# Patient Record
Sex: FEMALE | Race: WHITE | NOT HISPANIC OR LATINO | Employment: FULL TIME | URBAN - NONMETROPOLITAN AREA
[De-identification: names, ages, dates, MRNs, and addresses within clinical notes are randomized per-mention and may not be internally consistent; named-entity substitution may affect disease eponyms.]

---
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
## 2019-01-25 ENCOUNTER — HOSPITAL ENCOUNTER (OUTPATIENT)
Dept: LAB | Facility: MEDICAL CENTER | Age: 19
End: 2019-01-25
Attending: INTERNAL MEDICINE
Payer: COMMERCIAL

## 2019-01-25 LAB
ALBUMIN SERPL BCP-MCNC: 4.5 G/DL (ref 3.2–4.9)
ALBUMIN/GLOB SERPL: 1.7 G/DL
ALP SERPL-CCNC: 88 U/L (ref 30–99)
ALT SERPL-CCNC: 13 U/L (ref 2–50)
ANION GAP SERPL CALC-SCNC: 7 MMOL/L (ref 0–11.9)
AST SERPL-CCNC: 15 U/L (ref 12–45)
BASOPHILS # BLD AUTO: 0.4 % (ref 0–1.8)
BASOPHILS # BLD: 0.03 K/UL (ref 0–0.12)
BILIRUB SERPL-MCNC: 0.5 MG/DL (ref 0.1–1.5)
BUN SERPL-MCNC: 8 MG/DL (ref 8–22)
CALCIUM SERPL-MCNC: 9.2 MG/DL (ref 8.5–10.5)
CHLORIDE SERPL-SCNC: 107 MMOL/L (ref 96–112)
CO2 SERPL-SCNC: 25 MMOL/L (ref 20–33)
CREAT SERPL-MCNC: 0.81 MG/DL (ref 0.5–1.4)
CRP SERPL HS-MCNC: 0.33 MG/DL (ref 0–0.75)
EOSINOPHIL # BLD AUTO: 0.21 K/UL (ref 0–0.51)
EOSINOPHIL NFR BLD: 3.1 % (ref 0–6.9)
ERYTHROCYTE [DISTWIDTH] IN BLOOD BY AUTOMATED COUNT: 40.8 FL (ref 35.9–50)
GLOBULIN SER CALC-MCNC: 2.7 G/DL (ref 1.9–3.5)
GLUCOSE SERPL-MCNC: 92 MG/DL (ref 65–99)
HCT VFR BLD AUTO: 44.6 % (ref 37–47)
HGB BLD-MCNC: 14.4 G/DL (ref 12–16)
IMM GRANULOCYTES # BLD AUTO: 0.02 K/UL (ref 0–0.11)
IMM GRANULOCYTES NFR BLD AUTO: 0.3 % (ref 0–0.9)
LYMPHOCYTES # BLD AUTO: 1.43 K/UL (ref 1–4.8)
LYMPHOCYTES NFR BLD: 21.2 % (ref 22–41)
MCH RBC QN AUTO: 28.9 PG (ref 27–33)
MCHC RBC AUTO-ENTMCNC: 32.3 G/DL (ref 33.6–35)
MCV RBC AUTO: 89.4 FL (ref 81.4–97.8)
MONOCYTES # BLD AUTO: 0.52 K/UL (ref 0–0.85)
MONOCYTES NFR BLD AUTO: 7.7 % (ref 0–13.4)
NEUTROPHILS # BLD AUTO: 4.53 K/UL (ref 2–7.15)
NEUTROPHILS NFR BLD: 67.3 % (ref 44–72)
NRBC # BLD AUTO: 0 K/UL
NRBC BLD-RTO: 0 /100 WBC
PLATELET # BLD AUTO: 244 K/UL (ref 164–446)
PMV BLD AUTO: 12.5 FL (ref 9–12.9)
POTASSIUM SERPL-SCNC: 4.1 MMOL/L (ref 3.6–5.5)
PROT SERPL-MCNC: 7.2 G/DL (ref 6–8.2)
RBC # BLD AUTO: 4.99 M/UL (ref 4.2–5.4)
SODIUM SERPL-SCNC: 139 MMOL/L (ref 135–145)
WBC # BLD AUTO: 6.7 K/UL (ref 4.8–10.8)

## 2019-01-25 PROCEDURE — 36415 COLL VENOUS BLD VENIPUNCTURE: CPT

## 2019-01-25 PROCEDURE — 86140 C-REACTIVE PROTEIN: CPT

## 2019-01-25 PROCEDURE — 85025 COMPLETE CBC W/AUTO DIFF WBC: CPT

## 2019-01-25 PROCEDURE — 80053 COMPREHEN METABOLIC PANEL: CPT

## 2019-04-01 ENCOUNTER — NON-PROVIDER VISIT (OUTPATIENT)
Dept: MEDICAL GROUP | Facility: PHYSICIAN GROUP | Age: 19
End: 2019-04-01
Payer: COMMERCIAL

## 2019-04-01 VITALS — BODY MASS INDEX: 28.88 KG/M2 | WEIGHT: 184 LBS | HEIGHT: 67 IN

## 2019-04-01 DIAGNOSIS — N92.6 IRREGULAR MENSTRUAL CYCLE: ICD-10-CM

## 2019-04-01 LAB
INT CON NEG: NORMAL
INT CON POS: NORMAL
POC URINE PREGNANCY TEST: NEGATIVE

## 2019-04-01 PROCEDURE — 81025 URINE PREGNANCY TEST: CPT | Performed by: NURSE PRACTITIONER

## 2019-04-01 RX ORDER — MEDROXYPROGESTERONE ACETATE 150 MG/ML
150 INJECTION, SUSPENSION INTRAMUSCULAR ONCE
Status: COMPLETED | OUTPATIENT
Start: 2019-04-01 | End: 2019-04-01

## 2019-04-01 RX ADMIN — MEDROXYPROGESTERONE ACETATE 150 MG: 150 INJECTION, SUSPENSION INTRAMUSCULAR at 14:26

## 2019-04-01 NOTE — PROGRESS NOTES
PemaRobertAlexandra Hines is a 18 y.o. here for a Depo Provera Injection.     Date of last Depo Provera Injection: 12/6/2018  Current date within therapeutic range?: No   Urine pregnancy test done (needed if out of date range): yes--negative  Date of office visit:09/20/2018  Date of last pap (if > 21 years old)/ GYN exam: unknown  Dx: Contraceptive use    Order and dose verified by: MP  Patient tolerated injection and no adverse effects were observed or reported: Yes    # of Administrations remaining in MAR: 0  Next injection due between Jun 17 and July 1.

## 2019-04-23 ENCOUNTER — OFFICE VISIT (OUTPATIENT)
Dept: URGENT CARE | Facility: PHYSICIAN GROUP | Age: 19
End: 2019-04-23
Payer: COMMERCIAL

## 2019-04-23 ENCOUNTER — HOSPITAL ENCOUNTER (OUTPATIENT)
Facility: MEDICAL CENTER | Age: 19
End: 2019-04-23
Attending: PHYSICIAN ASSISTANT
Payer: COMMERCIAL

## 2019-04-23 ENCOUNTER — HOSPITAL ENCOUNTER (OUTPATIENT)
Facility: MEDICAL CENTER | Age: 19
End: 2019-04-23
Attending: EMERGENCY MEDICINE
Payer: COMMERCIAL

## 2019-04-23 VITALS
TEMPERATURE: 98.2 F | SYSTOLIC BLOOD PRESSURE: 118 MMHG | RESPIRATION RATE: 16 BRPM | DIASTOLIC BLOOD PRESSURE: 72 MMHG | BODY MASS INDEX: 28.51 KG/M2 | WEIGHT: 182 LBS | OXYGEN SATURATION: 98 % | HEART RATE: 96 BPM

## 2019-04-23 DIAGNOSIS — R10.2 PELVIC PAIN: ICD-10-CM

## 2019-04-23 DIAGNOSIS — R39.9 SYMPTOMS INVOLVING URINARY SYSTEM: ICD-10-CM

## 2019-04-23 LAB
APPEARANCE UR: NORMAL
BILIRUB UR STRIP-MCNC: NEGATIVE MG/DL
COLOR UR AUTO: NORMAL
GLUCOSE UR STRIP.AUTO-MCNC: NEGATIVE MG/DL
INT CON NEG: NORMAL
INT CON POS: NORMAL
KETONES UR STRIP.AUTO-MCNC: NEGATIVE MG/DL
LEUKOCYTE ESTERASE UR QL STRIP.AUTO: NEGATIVE
NITRITE UR QL STRIP.AUTO: NEGATIVE
PH UR STRIP.AUTO: 6 [PH] (ref 5–8)
POC URINE PREGNANCY TEST: NEGATIVE
PROT UR QL STRIP: NEGATIVE MG/DL
RBC UR QL AUTO: NEGATIVE
SP GR UR STRIP.AUTO: 1.02
UROBILINOGEN UR STRIP-MCNC: 0.2 MG/DL

## 2019-04-23 PROCEDURE — 81002 URINALYSIS NONAUTO W/O SCOPE: CPT | Performed by: EMERGENCY MEDICINE

## 2019-04-23 PROCEDURE — 99214 OFFICE O/P EST MOD 30 MIN: CPT | Performed by: EMERGENCY MEDICINE

## 2019-04-23 PROCEDURE — 87591 N.GONORRHOEAE DNA AMP PROB: CPT

## 2019-04-23 PROCEDURE — 87086 URINE CULTURE/COLONY COUNT: CPT

## 2019-04-23 PROCEDURE — 87491 CHLMYD TRACH DNA AMP PROBE: CPT

## 2019-04-23 PROCEDURE — 81025 URINE PREGNANCY TEST: CPT | Performed by: EMERGENCY MEDICINE

## 2019-04-23 ASSESSMENT — ENCOUNTER SYMPTOMS
NAUSEA: 0
SHORTNESS OF BREATH: 0
DIARRHEA: 0
ANOREXIA: 0
BLOOD IN STOOL: 0
FLATUS: 0
ROS GI COMMENTS: NO DECREASED APPETITE.
ABDOMINAL PAIN: 1
FLANK PAIN: 0
HEMATOCHEZIA: 0
CONSTIPATION: 0
VOMITING: 0
BELCHING: 0
FEVER: 0

## 2019-04-23 NOTE — PATIENT INSTRUCTIONS
Use over-the-counter acetaminophen (Tylenol) as needed for pain relief; follow the package instructions for dosing.  Abdominal Pain (Nonspecific)  Your exam might not show the exact reason you have abdominal pain. Since there are many different causes of abdominal pain, another checkup and more tests may be needed. It is very important to follow up for lasting (persistent) or worsening symptoms. A possible cause of abdominal pain in any person who still has his or her appendix is acute appendicitis. Appendicitis is often hard to diagnose. Normal blood tests, urine tests, ultrasound, and CT scans do not completely rule out early appendicitis or other causes of abdominal pain. Sometimes, only the changes that happen over time will allow appendicitis and other causes of abdominal pain to be determined. Other potential problems that may require surgery may also take time to become more apparent. Because of this, it is important that you follow all of the instructions below.  HOME CARE INSTRUCTIONS   · Rest as much as possible.   · Do not eat solid food until your pain is gone.   · While adults or children have pain: A diet of water, weak decaffeinated tea, broth or bouillon, gelatin, oral rehydration solutions (ORS), frozen ice pops, or ice chips may be helpful.   · When pain is gone in adults or children: Start a light diet (dry toast, crackers, applesauce, or white rice). Increase the diet slowly as long as it does not bother you. Eat no dairy products (including cheese and eggs) and no spicy, fatty, fried, or high-fiber foods.   · Use no alcohol, caffeine, or cigarettes.   · Take your regular medicines unless your caregiver told you not to.   · Take any prescribed medicine as directed.   · Only take over-the-counter or prescription medicines for pain, discomfort, or fever as directed by your caregiver. Do not give aspirin to children.   If your caregiver has given you a follow-up appointment, it is very important to  keep that appointment. Not keeping the appointment could result in a permanent injury and/or lasting (chronic) pain and/or disability. If there is any problem keeping the appointment, you must call to reschedule.   SEEK IMMEDIATE MEDICAL CARE IF:   · Your pain is not gone in 24 hours.   · Your pain becomes worse, changes location, or feels different.   · You or your child has an oral temperature above 102° F (38.9° C), not controlled by medicine.   · Your baby is older than 3 months with a rectal temperature of 102° F (38.9° C) or higher.   · Your baby is 3 months old or younger with a rectal temperature of 100.4° F (38° C) or higher.   · You have shaking chills.   · You keep throwing up (vomiting) or cannot drink liquids.   · There is blood in your vomit or you see blood in your bowel movements.   · Your bowel movements become dark or black.   · You have frequent bowel movements.   · Your bowel movements stop (become blocked) or you cannot pass gas.   · You have bloody, frequent, or painful urination.   · You have yellow discoloration in the skin or whites of the eyes.   · Your stomach becomes bloated or bigger.   · You have dizziness or fainting.   · You have chest or back pain.   MAKE SURE YOU:   · Understand these instructions.   · Will watch your condition.   · Will get help right away if you are not doing well or get worse.   Document Released: 12/18/2006 Document Revised: 03/11/2013 Document Reviewed: 11/15/2010  [employer]® Patient Information ©2013 [employer].

## 2019-04-23 NOTE — PROGRESS NOTES
Subjective:      Tamy Hines is a 18 y.o. female who presents with Urinary Frequency (with lower abd pain )            Abdominal Pain   This is a new problem. Episode onset: one month. The problem occurs daily. The problem has been waxing and waning. The pain is located in the suprapubic region, RLQ and LLQ. The pain is mild. The quality of the pain is a sensation of fullness. The abdominal pain does not radiate. Associated symptoms include frequency. Pertinent negatives include no anorexia, belching, constipation, diarrhea, dysuria, fever, flatus, hematochezia, hematuria, melena, nausea or vomiting. Nothing aggravates the pain. The pain is relieved by nothing. She has tried nothing for the symptoms. Her past medical history is significant for abdominal surgery and irritable bowel syndrome.       Review of Systems   Constitutional: Negative for fever.   Respiratory: Negative for shortness of breath.    Cardiovascular: Negative for chest pain.   Gastrointestinal: Positive for abdominal pain. Negative for anorexia, blood in stool, constipation, diarrhea, flatus, hematochezia, melena, nausea and vomiting.        No decreased appetite.   Genitourinary: Positive for frequency and urgency. Negative for dysuria, flank pain and hematuria.        Urinary incontinence. No vaginal discharge or bleeding. LMP months ago. Denies known sexually transmitted disease exposure.   Skin: Negative for rash.     PMH:  has a past medical history of Allergic rhinitis; ASTHMA (8/15/2013); Bowel habit changes; Gynecological disorder; Heart burn; Indigestion; Menarche (8/15/2013); and Nausea, vomiting and diarrhea (11/19/2014).  MEDS:   Current Outpatient Prescriptions:   •  fluticasone (FLONASE) 50 MCG/ACT nasal spray, Spray 1 Spray in nose every day. (Patient not taking: Reported on 4/23/2019), Disp: 16 g, Rfl: 0  •  ibuprofen (MOTRIN) 200 MG Tab, Take 400 mg by mouth every 6 hours as needed., Disp: , Rfl:   ALLERGIES: No Known  Allergies  SURGHX:   Past Surgical History:   Procedure Laterality Date   • SOHAM BY LAPAROSCOPY N/A 12/17/2015    Procedure: SOHAM BY LAPAROSCOPY ;  Surgeon: Yuliet Dale M.D.;  Location: SURGERY [facility];  Service:    • COLONOSCOPY  10/2015   • ENDOSCOPY  08/2015     SOCHX:  reports that she has never smoked. She has never used smokeless tobacco. She reports that she does not drink alcohol or use drugs.  FH: family history includes Other in her father and mother. She was adopted.       Objective:     /72   Pulse 96   Temp 36.8 °C (98.2 °F) (Temporal)   Resp 16   Wt 82.6 kg (182 lb)   SpO2 98%   BMI 28.51 kg/m²      Physical Exam   Constitutional: She appears well-developed and well-nourished. She is cooperative. She does not have a sickly appearance. She does not appear ill. No distress.   HENT:   Mouth/Throat: Mucous membranes are normal.   Eyes: Conjunctivae and lids are normal.   Cardiovascular: Normal rate, regular rhythm and normal heart sounds.    Pulmonary/Chest: Effort normal and breath sounds normal.   Abdominal: Soft. Bowel sounds are normal. She exhibits no distension, no pulsatile midline mass and no mass. There is no hepatosplenomegaly. There is tenderness in the right lower quadrant, suprapubic area and left lower quadrant. There is no rigidity, no guarding and no CVA tenderness. Hernia confirmed negative in the ventral area.   Genitourinary:   Genitourinary Comments: Declined pelvic exam.   Neurological: She is alert.   Skin: Skin is warm and dry.   Psychiatric: She has a normal mood and affect.               Assessment/Plan:     1. Symptoms involving urinary system  negative- POCT Urinalysis  negative- POCT Pregnancy  GC/chlamydia sent  UA reflex to culture sent  2. Pelvic pain  US pelvis ordered  CBC ordered  REF PCP

## 2019-04-24 ENCOUNTER — TELEPHONE (OUTPATIENT)
Dept: URGENT CARE | Facility: PHYSICIAN GROUP | Age: 19
End: 2019-04-24

## 2019-04-24 DIAGNOSIS — R10.2 PELVIC PAIN: ICD-10-CM

## 2019-04-24 DIAGNOSIS — R39.9 URINARY SYMPTOM OR SIGN: ICD-10-CM

## 2019-04-26 LAB
BACTERIA UR CULT: NORMAL
SIGNIFICANT IND 70042: NORMAL
SITE SITE: NORMAL
SOURCE SOURCE: NORMAL

## 2019-04-27 ENCOUNTER — TELEPHONE (OUTPATIENT)
Dept: URGENT CARE | Facility: PHYSICIAN GROUP | Age: 19
End: 2019-04-27

## 2019-04-27 NOTE — TELEPHONE ENCOUNTER
----- Message from Ritu Price P.A.-C. sent at 4/26/2019 11:18 AM PDT -----  Please let pt know the urine culture is negative. Follow up for persistent symptoms.

## 2019-04-27 NOTE — LETTER
April 27, 2019        Tamy Hines  [id number]  [place] [id number]        Dear Tamy    Here are the results of your test(s):   Urine culture: negative    Comments:  Hope you are feeling better.        Sincerely,        Nina Price PAC   Signed Electronically

## 2019-06-25 ENCOUNTER — OFFICE VISIT (OUTPATIENT)
Dept: URGENT CARE | Facility: CLINIC | Age: 19
End: 2019-06-25
Payer: COMMERCIAL

## 2019-06-25 ENCOUNTER — APPOINTMENT (OUTPATIENT)
Dept: RADIOLOGY | Facility: IMAGING CENTER | Age: 19
End: 2019-06-25
Attending: FAMILY MEDICINE
Payer: COMMERCIAL

## 2019-06-25 VITALS
HEART RATE: 74 BPM | BODY MASS INDEX: 29.1 KG/M2 | TEMPERATURE: 97 F | SYSTOLIC BLOOD PRESSURE: 110 MMHG | OXYGEN SATURATION: 95 % | HEIGHT: 67 IN | DIASTOLIC BLOOD PRESSURE: 80 MMHG | WEIGHT: 185.41 LBS | RESPIRATION RATE: 16 BRPM

## 2019-06-25 DIAGNOSIS — S90.122A CONTUSION OF SECOND TOE OF LEFT FOOT, INITIAL ENCOUNTER: ICD-10-CM

## 2019-06-25 DIAGNOSIS — S99.922A INJURY OF TOE ON LEFT FOOT, INITIAL ENCOUNTER: ICD-10-CM

## 2019-06-25 PROCEDURE — 99213 OFFICE O/P EST LOW 20 MIN: CPT | Performed by: FAMILY MEDICINE

## 2019-06-25 PROCEDURE — 73660 X-RAY EXAM OF TOE(S): CPT | Mod: TC,LT | Performed by: FAMILY MEDICINE

## 2019-06-25 ASSESSMENT — ENCOUNTER SYMPTOMS
SHORTNESS OF BREATH: 0
VOMITING: 0
SENSORY CHANGE: 0
ROS SKIN COMMENTS: + BRUISING
FEVER: 0

## 2019-06-25 NOTE — PROGRESS NOTES
"Subjective:     Tamy Hines is a 18 y.o. female who presents for Toe Injury (Possible break)    HPI  Pt presents for evaluation of a new problem   Patient with a toe injury yesterday  Was climbing over the bathroom stall and fell  Her foot fell into the toilet causing immediate pain in second toe of left foot  Now having constant pain in left second toe  Has associated bruising  Pain stays localized and does not radiate    Review of Systems   Constitutional: Negative for fever.   Respiratory: Negative for shortness of breath.    Cardiovascular: Negative for chest pain.   Gastrointestinal: Negative for vomiting.   Skin:        + Bruising   Neurological: Negative for sensory change.       PMH:  has a past medical history of Allergic rhinitis; ASTHMA (8/15/2013); Bowel habit changes; Gynecological disorder; Heart burn; Indigestion; Menarche (8/15/2013); and Nausea, vomiting and diarrhea (11/19/2014).  MEDS:   Current Outpatient Prescriptions:   •  fluticasone (FLONASE) 50 MCG/ACT nasal spray, Spray 1 Spray in nose every day. (Patient not taking: Reported on 4/23/2019), Disp: 16 g, Rfl: 0  •  ibuprofen (MOTRIN) 200 MG Tab, Take 400 mg by mouth every 6 hours as needed., Disp: , Rfl:   ALLERGIES: No Known Allergies  SURGHX:   Past Surgical History:   Procedure Laterality Date   • SOHAM BY LAPAROSCOPY N/A 12/17/2015    Procedure: SOHAM BY LAPAROSCOPY ;  Surgeon: Yuliet Dale M.D.;  Location: SURGERY [facility];  Service:    • COLONOSCOPY  10/2015   • ENDOSCOPY  08/2015     SOCHX:  reports that she has never smoked. She has never used smokeless tobacco. She reports that she does not drink alcohol or use drugs.  FH: Family history was reviewed, not contributing to acute injury     Objective:   /80   Pulse 74   Temp 36.1 °C (97 °F) (Temporal)   Resp 16   Ht 1.702 m (5' 7\")   Wt 84.1 kg (185 lb 6.5 oz)   SpO2 95%   BMI 29.04 kg/m²     Physical Exam   Constitutional: She is oriented to person, " place, and time. She appears well-developed and well-nourished. No distress.   HENT:   Head: Normocephalic and atraumatic.   Musculoskeletal:   Left ankle/foot:  Appearance - +Bruising of the second toe  Palpation - +TTP throughout 2nd toe.  No TTP along midfoot, base of the 5th metatarsal, MTP joints, or other toes.   ROM -limited by pain  Strength -limited pain  Neurovascular - 2+ dorsalis pedis and posterior tibial.  Sensation intact and equal bilaterally   Neurological: She is alert and oriented to person, place, and time. No sensory deficit.   Skin: Skin is warm and dry. She is not diaphoretic.   Psychiatric: She has a normal mood and affect. Her behavior is normal. Judgment and thought content normal.     Assessment/Plan:   Assessment    1. Contusion of second toe of left foot, initial encounter  X-ray shows no fracture.  Patient will be treated with conservative management including ibuprofen, ice, supportive shoe wear, and buddy taping for comfort.  Follow-up as needed.  - DX-TOE(S) 2+ LEFT; Future

## 2019-06-28 ENCOUNTER — NON-PROVIDER VISIT (OUTPATIENT)
Dept: MEDICAL GROUP | Facility: PHYSICIAN GROUP | Age: 19
End: 2019-06-28
Payer: COMMERCIAL

## 2019-06-28 DIAGNOSIS — Z30.42 ENCOUNTER FOR SURVEILLANCE OF INJECTABLE CONTRACEPTIVE: ICD-10-CM

## 2019-06-28 PROCEDURE — 96372 THER/PROPH/DIAG INJ SC/IM: CPT | Performed by: NURSE PRACTITIONER

## 2019-06-28 RX ORDER — MEDROXYPROGESTERONE ACETATE 150 MG/ML
150 INJECTION, SUSPENSION INTRAMUSCULAR ONCE
Status: COMPLETED | OUTPATIENT
Start: 2019-06-28 | End: 2019-06-28

## 2019-06-28 RX ADMIN — MEDROXYPROGESTERONE ACETATE 150 MG: 150 INJECTION, SUSPENSION INTRAMUSCULAR at 13:25

## 2019-07-02 NOTE — ASSESSMENT & PLAN NOTE
Diarrhea for 4 days, 6 to 7 times a day, last this a.m., chronic tenderness,  Bloating goes away with diarrhea, frontal headache for couple days, no blood.  Different than IBS because more frequent diarrhea, and vomiting   Nausea and vomitting for 5 days, not eating, drinking water.  Last vomit last night after dinner.  Had potato chips last.  Stayed home from school yesterday.  Zofran without relief.  [place] five days ago. Snow  immodium for 5 days without relief, 2 to 3 times a day    Fever 101, 2 days ago  
Patient reports diarrhea for 4 days, 6 to 7 times a day. She has IBS, and typically has diarrhea 2 times a day.  Last had diarrhea about 2 hours ago.  She describes it as watery with some bits of stool.  She gets severe bloating, which is relieved by bowel movement.  Taking Immodium 2 to 3 times a day without relief. Denies blood in stool.  Has chronic mild abdominal pain, denies increase in severity.  She also has had nausea and vomiting for 4 days, emesis 2 to 3 times a day. Able to drink sips of liquid and popsicles.  Has tried Zofran a couple times without relief. Last emesis was last night after dinner.  She ate some potato chips this morning.  Reports a temperature of 101 degrees 2 days ago, none since.  Afebrile today. Has a mild headache for last 2 days. Feeling a little achy and run-down. No one else at home is ill.  Only recent travel was 4 days ago, when went up to [facility] and played in the snow. She did not eat snow. Denies recent antibiotic use.  
negative

## 2019-07-12 ENCOUNTER — OFFICE VISIT (OUTPATIENT)
Dept: URGENT CARE | Facility: PHYSICIAN GROUP | Age: 19
End: 2019-07-12
Payer: COMMERCIAL

## 2019-07-12 VITALS
WEIGHT: 182 LBS | RESPIRATION RATE: 16 BRPM | HEART RATE: 65 BPM | TEMPERATURE: 98 F | DIASTOLIC BLOOD PRESSURE: 70 MMHG | SYSTOLIC BLOOD PRESSURE: 124 MMHG | BODY MASS INDEX: 28.51 KG/M2 | OXYGEN SATURATION: 99 %

## 2019-07-12 DIAGNOSIS — T63.421A FIRE ANT BITE, ACCIDENTAL OR UNINTENTIONAL, INITIAL ENCOUNTER: ICD-10-CM

## 2019-07-12 PROCEDURE — 99213 OFFICE O/P EST LOW 20 MIN: CPT | Performed by: FAMILY MEDICINE

## 2019-07-12 RX ORDER — TRIAMCINOLONE ACETONIDE 1 MG/G
OINTMENT TOPICAL
Qty: 1 TUBE | Refills: 0 | Status: SHIPPED | OUTPATIENT
Start: 2019-07-12 | End: 2020-04-02

## 2019-07-12 ASSESSMENT — ENCOUNTER SYMPTOMS
FOCAL WEAKNESS: 0
MYALGIAS: 0
CHILLS: 0
SENSORY CHANGE: 0
EYE REDNESS: 0
EYE DISCHARGE: 0
FEVER: 0

## 2019-07-12 NOTE — PROGRESS NOTES
Subjective:      Tamy Hines is a 18 y.o. female who presents with Bug Bite (mutliple bug bites on legs and hands )            Multiple bites on legs and hands.  Suspected fire ant bites.  No expanding redness.  No drainage.  Itching is moderate to severe.  Minimal relief with the topical diphenhydramine.  She is also taken oral antihistamine.  No oral swelling.  No shortness of breath or wheezing.  No known allergy to ant bites.        Review of Systems   Constitutional: Negative for chills and fever.   Eyes: Negative for discharge and redness.   Musculoskeletal: Negative for joint pain and myalgias.   Skin: Negative for itching and rash.   Neurological: Negative for sensory change and focal weakness.     .  Medications, Allergies, and current problem list reviewed today in Epic       Objective:     /70   Pulse 65   Temp 36.7 °C (98 °F)   Resp 16   Wt 82.6 kg (182 lb)   SpO2 99%   BMI 28.51 kg/m²      Physical Exam   Constitutional: She appears well-developed and well-nourished. No distress.   HENT:   Head: Normocephalic and atraumatic.   Mouth/Throat: Oropharynx is clear and moist.   Neck: No tracheal deviation present.   Cardiovascular: Normal rate, regular rhythm and normal heart sounds.    No murmur heard.  Pulmonary/Chest: Effort normal and breath sounds normal. No stridor. No respiratory distress. She has no wheezes.   Skin: Skin is warm and dry.   Multiple papules with central puncture to legs and dorsum of bilateral hands.  No evidence of secondary bacterial infection.  No lymphangitis.  No vesicles.               Assessment/Plan:     1. Fire ant bite, accidental or unintentional, initial encounter    - triamcinolone acetonide (KENALOG) 0.1 % Ointment; Apply thin layer to affected area twice daily as needed  Dispense: 1 Tube; Refill: 0    Differential diagnosis, natural history, supportive care, and indications for immediate follow-up discussed at length.

## 2019-12-30 ENCOUNTER — NON-PROVIDER VISIT (OUTPATIENT)
Dept: MEDICAL GROUP | Facility: PHYSICIAN GROUP | Age: 19
End: 2019-12-30
Payer: COMMERCIAL

## 2019-12-30 DIAGNOSIS — Z30.8 ENCOUNTER FOR OTHER CONTRACEPTIVE MANAGEMENT: ICD-10-CM

## 2019-12-30 LAB
INT CON NEG: NEGATIVE
INT CON POS: POSITIVE
POC URINE PREGNANCY TEST: NORMAL

## 2019-12-30 PROCEDURE — 96372 THER/PROPH/DIAG INJ SC/IM: CPT | Performed by: NURSE PRACTITIONER

## 2019-12-30 PROCEDURE — 81025 URINE PREGNANCY TEST: CPT | Performed by: NURSE PRACTITIONER

## 2019-12-30 RX ORDER — MEDROXYPROGESTERONE ACETATE 150 MG/ML
150 INJECTION, SUSPENSION INTRAMUSCULAR ONCE
Status: COMPLETED | OUTPATIENT
Start: 2019-12-30 | End: 2019-12-30

## 2019-12-30 RX ADMIN — MEDROXYPROGESTERONE ACETATE 150 MG: 150 INJECTION, SUSPENSION INTRAMUSCULAR at 10:06

## 2019-12-30 NOTE — NON-PROVIDER
PemaRobertAlexandra Hines is a 19 y.o. here for a Depo Provera Injection.     Date of last Depo Provera Injection: 6/27/19  Current date within therapeutic range?: No   Urine pregnancy test done (needed if out of date range): yes--NEG  Date of office visit:12/30/19  Date of last pap (if > 21 years old)/ GYN exam: NA  Dx: Contraceptive use    Order and dose verified by: Aki  Patient tolerated injection and no adverse effects were observed or reported: Yes    # of Administrations remaining in MAR: 0  Next injection due between 3/13/20 and 3/27/20.

## 2020-04-02 ENCOUNTER — OFFICE VISIT (OUTPATIENT)
Dept: MEDICAL GROUP | Facility: PHYSICIAN GROUP | Age: 20
End: 2020-04-02
Payer: COMMERCIAL

## 2020-04-02 VITALS
HEART RATE: 75 BPM | DIASTOLIC BLOOD PRESSURE: 66 MMHG | TEMPERATURE: 98.1 F | RESPIRATION RATE: 16 BRPM | OXYGEN SATURATION: 95 % | WEIGHT: 192 LBS | BODY MASS INDEX: 30.13 KG/M2 | SYSTOLIC BLOOD PRESSURE: 98 MMHG | HEIGHT: 67 IN

## 2020-04-02 DIAGNOSIS — Z23 NEED FOR VACCINATION: ICD-10-CM

## 2020-04-02 DIAGNOSIS — Z30.42 ENCOUNTER FOR SURVEILLANCE OF INJECTABLE CONTRACEPTIVE: ICD-10-CM

## 2020-04-02 DIAGNOSIS — Z30.40 ENCOUNTER FOR SURVEILLANCE OF CONTRACEPTIVES, UNSPECIFIED CONTRACEPTIVE: ICD-10-CM

## 2020-04-02 DIAGNOSIS — B07.0 PLANTAR WART: ICD-10-CM

## 2020-04-02 PROCEDURE — 99214 OFFICE O/P EST MOD 30 MIN: CPT | Mod: 25 | Performed by: FAMILY MEDICINE

## 2020-04-02 PROCEDURE — 90621 MENB-FHBP VACC 2/3 DOSE IM: CPT | Performed by: FAMILY MEDICINE

## 2020-04-02 PROCEDURE — 90732 PPSV23 VACC 2 YRS+ SUBQ/IM: CPT | Performed by: FAMILY MEDICINE

## 2020-04-02 PROCEDURE — 90471 IMMUNIZATION ADMIN: CPT | Performed by: FAMILY MEDICINE

## 2020-04-02 PROCEDURE — 90651 9VHPV VACCINE 2/3 DOSE IM: CPT | Performed by: FAMILY MEDICINE

## 2020-04-02 PROCEDURE — 90472 IMMUNIZATION ADMIN EACH ADD: CPT | Performed by: FAMILY MEDICINE

## 2020-04-02 RX ORDER — AMITRIPTYLINE HYDROCHLORIDE 50 MG/1
50 TABLET, FILM COATED ORAL
Qty: 90 TAB | Refills: 3 | Status: SHIPPED | OUTPATIENT
Start: 2020-04-02 | End: 2022-01-19

## 2020-04-02 RX ORDER — MEDROXYPROGESTERONE ACETATE 150 MG/ML
150 INJECTION, SUSPENSION INTRAMUSCULAR ONCE
Status: COMPLETED | OUTPATIENT
Start: 2020-04-02 | End: 2020-04-02

## 2020-04-02 RX ORDER — AMITRIPTYLINE HYDROCHLORIDE 25 MG/1
25 TABLET, FILM COATED ORAL NIGHTLY
COMMUNITY
End: 2020-04-02

## 2020-04-02 RX ADMIN — MEDROXYPROGESTERONE ACETATE 150 MG: 150 INJECTION, SUSPENSION INTRAMUSCULAR at 11:28

## 2020-04-02 ASSESSMENT — PATIENT HEALTH QUESTIONNAIRE - PHQ9: CLINICAL INTERPRETATION OF PHQ2 SCORE: 0

## 2020-04-02 ASSESSMENT — FIBROSIS 4 INDEX: FIB4 SCORE: 0.34

## 2020-04-02 NOTE — ASSESSMENT & PLAN NOTE
Been on the Depo for a few years.   No periods on it.   LMP: a few years ago  Last Depo December.   She is sexually active with one partner. She denies any partner abuse.   Urine pregnancy test is negative in clinic.   Will provide depo in clinic today.   Immunizations updated and provided in clinic today.

## 2020-04-02 NOTE — ASSESSMENT & PLAN NOTE
Left hand wart been there for about 2 months.   Does not want cryotherapy  Advised using wart pad OTC.

## 2020-05-18 ENCOUNTER — HOSPITAL ENCOUNTER (OUTPATIENT)
Dept: LAB | Facility: MEDICAL CENTER | Age: 20
End: 2020-05-18
Attending: OBSTETRICS & GYNECOLOGY
Payer: COMMERCIAL

## 2020-05-18 PROCEDURE — 87491 CHLMYD TRACH DNA AMP PROBE: CPT

## 2020-05-18 PROCEDURE — 87624 HPV HI-RISK TYP POOLED RSLT: CPT

## 2020-05-18 PROCEDURE — 87591 N.GONORRHOEAE DNA AMP PROB: CPT

## 2020-05-18 PROCEDURE — 88175 CYTOPATH C/V AUTO FLUID REDO: CPT

## 2020-05-19 LAB
C TRACH DNA GENITAL QL NAA+PROBE: NEGATIVE
CYTOLOGY REG CYTOL: NORMAL
N GONORRHOEA DNA GENITAL QL NAA+PROBE: NEGATIVE
SPECIMEN SOURCE: NORMAL

## 2020-05-20 LAB
HPV HR 12 DNA CVX QL NAA+PROBE: POSITIVE
HPV16 DNA SPEC QL NAA+PROBE: NEGATIVE
HPV18 DNA SPEC QL NAA+PROBE: NEGATIVE
SPECIMEN SOURCE: ABNORMAL

## 2020-06-03 ENCOUNTER — HOSPITAL ENCOUNTER (OUTPATIENT)
Dept: LAB | Facility: MEDICAL CENTER | Age: 20
End: 2020-06-03
Attending: OBSTETRICS & GYNECOLOGY
Payer: COMMERCIAL

## 2020-06-03 LAB — PATHOLOGY CONSULT NOTE: NORMAL

## 2020-06-03 PROCEDURE — 88305 TISSUE EXAM BY PATHOLOGIST: CPT | Mod: 59

## 2020-06-30 ENCOUNTER — OFFICE VISIT (OUTPATIENT)
Dept: MEDICAL GROUP | Facility: PHYSICIAN GROUP | Age: 20
End: 2020-06-30
Payer: COMMERCIAL

## 2020-06-30 VITALS
BODY MASS INDEX: 31.02 KG/M2 | RESPIRATION RATE: 12 BRPM | HEIGHT: 66 IN | HEART RATE: 68 BPM | DIASTOLIC BLOOD PRESSURE: 78 MMHG | OXYGEN SATURATION: 99 % | WEIGHT: 193 LBS | SYSTOLIC BLOOD PRESSURE: 102 MMHG | TEMPERATURE: 97.8 F

## 2020-06-30 DIAGNOSIS — R07.81 RIB PAIN: ICD-10-CM

## 2020-06-30 DIAGNOSIS — Z30.40 ENCOUNTER FOR SURVEILLANCE OF CONTRACEPTIVES, UNSPECIFIED CONTRACEPTIVE: ICD-10-CM

## 2020-06-30 DIAGNOSIS — M79.641 PAIN IN BOTH HANDS: ICD-10-CM

## 2020-06-30 DIAGNOSIS — M79.642 PAIN IN BOTH HANDS: ICD-10-CM

## 2020-06-30 DIAGNOSIS — J30.2 SEASONAL ALLERGIES: ICD-10-CM

## 2020-06-30 LAB
INT CON NEG: NORMAL
INT CON POS: NORMAL
POC URINE PREGNANCY TEST: NORMAL

## 2020-06-30 PROCEDURE — 99214 OFFICE O/P EST MOD 30 MIN: CPT | Mod: 25 | Performed by: NURSE PRACTITIONER

## 2020-06-30 PROCEDURE — 81025 URINE PREGNANCY TEST: CPT | Performed by: NURSE PRACTITIONER

## 2020-06-30 RX ORDER — CETIRIZINE HYDROCHLORIDE 10 MG/1
10 TABLET ORAL DAILY
Qty: 90 TAB | Refills: 3 | Status: SHIPPED | OUTPATIENT
Start: 2020-06-30 | End: 2022-01-19

## 2020-06-30 RX ORDER — MEDROXYPROGESTERONE ACETATE 150 MG/ML
150 INJECTION, SUSPENSION INTRAMUSCULAR ONCE
Status: COMPLETED | OUTPATIENT
Start: 2020-06-30 | End: 2020-06-30

## 2020-06-30 RX ADMIN — MEDROXYPROGESTERONE ACETATE 150 MG: 150 INJECTION, SUSPENSION INTRAMUSCULAR at 11:41

## 2020-06-30 ASSESSMENT — FIBROSIS 4 INDEX: FIB4 SCORE: 0.34

## 2020-06-30 NOTE — ASSESSMENT & PLAN NOTE
Patient has chronic pain in both hands  Positive Phalen's test  Has been seen for this before, was advised to wear wrist splints but does not feel they are helping  She would like further evaluation, referral to neurodiagnostics ordered

## 2020-06-30 NOTE — ASSESSMENT & PLAN NOTE
Patient does have history of seasonal allergies  Has been taking over-the-counter Zyrtec but would like this called in his prescription  This has been called in

## 2020-06-30 NOTE — PROGRESS NOTES
Chief Complaint   Patient presents with   • Fall     Rib pain x 1 week    • Injections     DEPO    • Hand Pain     x 1 year          This is a 19 y.o.female patient that presents today with the following: Depression, rib pain after fall, bilateral hand pain, seasonal allergies    Encounter for surveillance of contraceptives  Patient here for Depo-Provera, in office pregnancy test negative  Administered by MA    Pain in both hands  Patient has chronic pain in both hands  Positive Phalen's test  Has been seen for this before, was advised to wear wrist splints but does not feel they are helping  She would like further evaluation, referral to neurodiagnostics ordered    Rib pain  Patient fell out of hammock last week  Has left arm and left lower rib discomfort but it is improving  Does have mild tenderness with deep inspiration  Discussed the importance of ongoing supportive care including over-the-counter analgesics, heat or ice  She is to follow-up if symptoms do not improve, worsen or she develops any other associated symptoms    Seasonal allergies  Patient does have history of seasonal allergies  Has been taking over-the-counter Zyrtec but would like this called in his prescription  This has been called in      Hospital Outpatient Visit on 06/03/2020   Component Date Value   • Pathology Request 06/03/2020 Sent to [facility]          clinical course has been stable    Past Medical History:   Diagnosis Date   • Allergic rhinitis    • ASTHMA 8/15/2013    inhaler as needed   • Bowel habit changes     diarrhea   • Gynecological disorder     severe cramps and cysts   • Heart burn     knees, back, hands, stomach 4-10/10   • Indigestion    • Menarche 8/15/2013    Started at 10. Fairly regular.   • Nausea, vomiting and diarrhea 11/19/2014       Past Surgical History:   Procedure Laterality Date   • SOHAM BY LAPAROSCOPY N/A 12/17/2015    Procedure: SOHAM BY LAPAROSCOPY ;  Surgeon: Yuliet Dale M.D.;  Location: SURGERY [facility]  "[facility];  Service:    • COLONOSCOPY  10/2015   • ENDOSCOPY  08/2015       Family History   Adopted: Yes   Problem Relation Age of Onset   • Other Mother         adopted   • Other Father         adopted       Patient has no known allergies.    Current Outpatient Medications Ordered in Epic   Medication Sig Dispense Refill   • cetirizine (ZYRTEC) 10 MG Tab Take 1 Tab by mouth every day. 90 Tab 3   • amitriptyline (ELAVIL) 50 MG Tab Take 1 Tab by mouth every bedtime. 90 Tab 3   • ibuprofen (MOTRIN) 200 MG Tab Take 400 mg by mouth every 6 hours as needed.       No current Epic-ordered facility-administered medications on file.        Constitutional ROS: No unexpected change in weight, No weakness, No unexplained fevers, sweats, or chills  Pulmonary ROS: Positive for allergies  Cardiovascular ROS: No chest pain  Gastrointestinal ROS: No abdominal pain, No nausea, vomiting, diarrhea, or constipation  Musculoskeletal/Extremities ROS: Positive per HPI  Neurologic ROS: Normal development, No seizures, No weakness   ROS: Positive per HPI    Physical exam:  /78   Pulse 68   Temp 36.6 °C (97.8 °F) (Temporal)   Resp 12   Ht 1.664 m (5' 5.5\")   Wt 87.5 kg (193 lb)   SpO2 99%   BMI 31.63 kg/m²   General Appearance: Pleasant young female, alert, no distress, well-groomed  Skin: Skin color, texture, turgor normal. No rashes or lesions.  Lungs: negative findings: normal respiratory rate and rhythm, lungs clear to auscultation  Heart: negative. RRR without murmur, gallop, or rubs.  No ectopy.  Abdomen: Abdomen soft, non-tender. BS normal. No masses,  No organomegaly  Extremities: positive findings: Positive Phalen's test bilaterally  Musculoskeletal: negative findings: no evidence of joint instability, no evidence of muscle atrophy, no deformities present  Neurologic: intact, CN II through XII grossly intact    Medical decision making/discussion:     Depo shot    Referral to neurodiagnostics    [facility] called " in    Supportive care for rib pain--ice or heat, ibuprofen or tylenol    Follow up as needed    Tamy was seen today for fall, injections and hand pain.    Diagnoses and all orders for this visit:    Pain in both hands  -     REFERRAL TO NEURODIAGNOSTICS (EEG,EP,EMG/NCS/DBS) Modality Requested: EMG/NCS-Comment Extremities    Rib pain    Seasonal allergies    Encounter for surveillance of contraceptives, unspecified contraceptive  -     POCT Pregnancy  -     medroxyPROGESTERone (DEPO-PROVERA) injection 150 mg    Other orders  -     cetirizine (ZYRTEC) 10 MG Tab; Take 1 Tab by mouth every day.        Return if symptoms worsen or fail to improve, for Follow-up.        Please note that this dictation was created using voice recognition software. I have made every reasonable attempt to correct obvious errors, but I expect that there are errors of grammar and possibly content that I did not discover before finalizing the note.

## 2020-06-30 NOTE — ASSESSMENT & PLAN NOTE
Patient fell out of hammock last week  Has left arm and left lower rib discomfort but it is improving  Does have mild tenderness with deep inspiration  Discussed the importance of ongoing supportive care including over-the-counter analgesics, heat or ice  She is to follow-up if symptoms do not improve, worsen or she develops any other associated symptoms

## 2020-06-30 NOTE — PATIENT INSTRUCTIONS
Depo shot    Referral to neurodiagnostics    Zyrtec called in    Supportive care for rib pain--ice or heat, ibuprofen or tylenol    Follow up as needed

## 2020-07-08 ENCOUNTER — HOSPITAL ENCOUNTER (OUTPATIENT)
Facility: MEDICAL CENTER | Age: 20
End: 2020-07-08
Attending: PHYSICIAN ASSISTANT
Payer: COMMERCIAL

## 2020-07-08 ENCOUNTER — NURSE TRIAGE (OUTPATIENT)
Dept: HEALTH INFORMATION MANAGEMENT | Facility: OTHER | Age: 20
End: 2020-07-08

## 2020-07-08 ENCOUNTER — OFFICE VISIT (OUTPATIENT)
Dept: URGENT CARE | Facility: PHYSICIAN GROUP | Age: 20
End: 2020-07-08
Payer: COMMERCIAL

## 2020-07-08 VITALS
WEIGHT: 190 LBS | TEMPERATURE: 98 F | RESPIRATION RATE: 16 BRPM | DIASTOLIC BLOOD PRESSURE: 80 MMHG | BODY MASS INDEX: 29.82 KG/M2 | SYSTOLIC BLOOD PRESSURE: 148 MMHG | HEIGHT: 67 IN | OXYGEN SATURATION: 95 % | HEART RATE: 88 BPM

## 2020-07-08 DIAGNOSIS — J03.90 EXUDATIVE TONSILLITIS: ICD-10-CM

## 2020-07-08 LAB
HETEROPH AB SER QL LA: NEGATIVE
INT CON NEG: NORMAL
INT CON NEG: NORMAL
INT CON POS: NORMAL
INT CON POS: NORMAL
S PYO AG THROAT QL: NEGATIVE

## 2020-07-08 PROCEDURE — 99214 OFFICE O/P EST MOD 30 MIN: CPT | Performed by: PHYSICIAN ASSISTANT

## 2020-07-08 PROCEDURE — 86308 HETEROPHILE ANTIBODY SCREEN: CPT | Performed by: PHYSICIAN ASSISTANT

## 2020-07-08 PROCEDURE — 87880 STREP A ASSAY W/OPTIC: CPT | Performed by: PHYSICIAN ASSISTANT

## 2020-07-08 PROCEDURE — 87077 CULTURE AEROBIC IDENTIFY: CPT

## 2020-07-08 PROCEDURE — 87070 CULTURE OTHR SPECIMN AEROBIC: CPT

## 2020-07-08 RX ORDER — AMOXICILLIN 500 MG/1
500 CAPSULE ORAL 2 TIMES DAILY
Qty: 20 CAP | Refills: 0 | Status: SHIPPED | OUTPATIENT
Start: 2020-07-08 | End: 2020-07-18

## 2020-07-08 ASSESSMENT — FIBROSIS 4 INDEX: FIB4 SCORE: 0.34

## 2020-07-08 NOTE — PROGRESS NOTES
Chief Complaint   Patient presents with   • Pharyngitis     Possible strep       HISTORY OF PRESENT ILLNESS: Patient is a 19 y.o. female who presents today for the following:    Patient comes in for evaluation of a sore throat that started 2 days ago.  She complains of headache, chills, mild nasal congestion, and a mild cough.  She has not had any over-the-counter medication today.  She denies shortness of breath.    Patient Active Problem List    Diagnosis Date Noted   • Primary insomnia 10/19/2016     Priority: High   • Functional abdominal pain syndrome 06/02/2016     Priority: High   • Pain in both wrists 01/16/2018     Priority: Medium   • Irritable bowel syndrome 06/19/2017     Priority: Medium   • Mood changes 10/19/2016     Priority: Medium   • Cephalalgia 06/02/2016     Priority: Medium   • Asthma, mild intermittent, well-controlled 04/27/2015     Priority: Medium   • Multiple joint pain 03/08/2015     Priority: Medium   • Dysmenorrhea 03/08/2015     Priority: Medium   • Irregular menstrual cycle 03/08/2015     Priority: Medium   • Chronic intractable headache 06/19/2017     Priority: Low   • Anxiety, generalized 06/19/2017     Priority: Low   • Food allergy 04/21/2016     Priority: Low   • Seasonal allergic rhinitis 04/21/2016     Priority: Low   • Eczema 02/24/2016     Priority: Low   • Gastroesophageal reflux disease with esophagitis 02/24/2016     Priority: Low   • Abdominal cramps 12/17/2015     Priority: Low   • Vitamin D deficiency disease 05/29/2015     Priority: Low   • Pain in both hands 06/30/2020   • Rib pain 06/30/2020   • Seasonal allergies 06/30/2020   • Encounter for surveillance of contraceptives 04/02/2020   • Plantar wart 04/02/2020   • Acute swimmer's ear of both sides 07/30/2018   • Nausea 05/22/2018   • Dizziness 05/08/2018   • Pharyngitis 04/12/2018   • Bilateral hearing loss 04/12/2018   • Decreased hearing of right ear 09/28/2013       Allergies:Patient has no known  allergies.    Current Outpatient Medications Ordered in Epic   Medication Sig Dispense Refill   • amoxicillin (AMOXIL) 500 MG Cap Take 1 Cap by mouth 2 times a day for 10 days. 20 Cap 0   • amitriptyline (ELAVIL) 50 MG Tab Take 1 Tab by mouth every bedtime. 90 Tab 3   • cetirizine (ZYRTEC) 10 MG Tab Take 1 Tab by mouth every day. (Patient not taking: Reported on 7/8/2020) 90 Tab 3   • ibuprofen (MOTRIN) 200 MG Tab Take 400 mg by mouth every 6 hours as needed.       No current Epic-ordered facility-administered medications on file.        Past Medical History:   Diagnosis Date   • Allergic rhinitis    • ASTHMA 8/15/2013    inhaler as needed   • Bowel habit changes     diarrhea   • Gynecological disorder     severe cramps and cysts   • Heart burn     knees, back, hands, stomach 4-10/10   • Indigestion    • Menarche 8/15/2013    Started at 10. Fairly regular.   • Nausea, vomiting and diarrhea 11/19/2014       Social History     Tobacco Use   • Smoking status: Never Smoker   • Smokeless tobacco: Never Used   Substance Use Topics   • Alcohol use: Yes     Alcohol/week: 0.0 oz     Comment: Social   • Drug use: No       Family Status   Relation Name Status   • Mo  Alive        adopted   • Fa  Alive     Family History   Adopted: Yes   Problem Relation Age of Onset   • Other Mother         adopted   • Other Father         adopted       Review of Systems:   Constitutional ROS: No unexpected change in weight, No weakness, No fatigue  Eye ROS: No recent significant change in vision, No eye pain, redness, discharge  Ear ROS: No drainage, No tinnitus or vertigo, No recent change in hearing  Mouth/Throat ROS: No teeth or gum problems, No bleeding gums, No tongue complaints  Neck ROS: No swollen glands, No significant pain in neck  Pulmonary ROS: No chronic cough, sputum, or hemoptysis, No dyspnea on exertion, No wheezing  Cardiovascular ROS: No diaphoresis, No edema, No palpitations  Musculoskeletal/Extremities ROS: No peripheral  "edema, No pain, redness or swelling on the joints  Hematologic/Lymphatic ROS: Positive for chills.  Skin/Integumentary ROS: No edema, No evidence of rash, No itching      Exam:  /80   Pulse 88   Temp 36.7 °C (98 °F) (Temporal)   Resp 16   Ht 1.702 m (5' 7\")   Wt 86.2 kg (190 lb)   SpO2 95%   General: Well developed, well nourished. No distress.    Eye: PERRL/EOMI; conjunctivae clear, lids normal.  ENMT: Lips without lesions, MMM.  Bilateral tonsillar edema, erythema, and exudate. Bilateral TMs are within normal limits.  Pulmonary: Unlabored respiratory effort. Lungs clear to auscultation, no wheezes, no rhonchi.    Cardiovascular: Regular rate and rhythm without murmur.   Neurologic: Grossly nonfocal. No facial asymmetry noted.  Lymph: Tender anterior cervical lymphadenopathy noted.  Skin: Warm, dry, good turgor. No rashes in visible areas.   Psych: Normal mood. Alert and oriented to person, place and time.    Rapid strep: Negative  Rapid mononucleosis: Negative    Assessment/Plan:  Discussed possible viral etiology but will treat as strep as patient fits Centor criteria. Discussed appropriate over-the-counter symptomatic medication, and when to return to clinic.  Will contact patient with culture results.  Follow up for worsening or persistent symptoms.  1. Exudative tonsillitis  POCT Mononucleosis (mono)    POCT Rapid Strep A    CULTURE THROAT    amoxicillin (AMOXIL) 500 MG Cap       "

## 2020-07-08 NOTE — TELEPHONE ENCOUNTER
1. Caller Name: Tamy Hines              Call Back Number: [phone]  [facility] PCP or Specialty Provider: Yes Dr. Anabela Chong        2.  In the last two weeks, has the patient had any new or worsening symptoms (not explained by alternative diagnosis)? Yes, the patient reports the following COVID-19 consistent symptoms: cough and sore throat. Phlegmy cough, sore throat with white spots at the back of throat, difficult to swallow x 3 days, chronic GI symptoms    3.  Does patient have any comoribidities? Immunosuppressed Asthma, chronic stomach issues per pt    4.  Has the patient traveled in the last 14 days OR had any known contact with someone who is suspected or confirmed to have COVID-19?  No.    5. Disposition: Advised to go to     Note routed to [facility] Provider: [facility] only.   PCP on maternity leave, pt to go [facility]

## 2020-07-08 NOTE — TELEPHONE ENCOUNTER
Regarding: Pt thinks they have strep throat  ----- Message from Jostin Shine Ass't sent at 7/8/2020  3:52 PM PDT -----  Pt has symptoms of sore throat and has spots on their throat.  Transferred to nurse.     Patient called back and she went to urgent care. Rapid strep test and mono were negative. Awaiting 48 hours for throat culture to finalize.  She is OK with the advice she got from Urgent care.

## 2020-07-09 DIAGNOSIS — J03.90 EXUDATIVE TONSILLITIS: ICD-10-CM

## 2020-07-11 LAB
BACTERIA SPEC RESP CULT: ABNORMAL
BACTERIA SPEC RESP CULT: ABNORMAL
SIGNIFICANT IND 70042: ABNORMAL
SITE SITE: ABNORMAL
SOURCE SOURCE: ABNORMAL

## 2020-08-05 ENCOUNTER — PATIENT MESSAGE (OUTPATIENT)
Dept: MEDICAL GROUP | Facility: PHYSICIAN GROUP | Age: 20
End: 2020-08-05

## 2020-08-06 NOTE — TELEPHONE ENCOUNTER
From: Tamy Hines  To: MARIEL Bowie  Sent: 8/5/2020 10:08 PM PDT  Subject: Procedure Question    You referred me to neurodiagnostics for my wrist pain and I haven’t heard anything from them. It’s hurting really bad so I’d like to get that test done and figure out what’s going on. Thank you.

## 2020-08-11 ENCOUNTER — NON-PROVIDER VISIT (OUTPATIENT)
Dept: NEUROLOGY | Facility: MEDICAL CENTER | Age: 20
End: 2020-08-11
Payer: COMMERCIAL

## 2020-08-11 DIAGNOSIS — M79.642 BILATERAL HAND PAIN: ICD-10-CM

## 2020-08-11 DIAGNOSIS — M79.641 BILATERAL HAND PAIN: ICD-10-CM

## 2020-08-11 PROCEDURE — 95910 NRV CNDJ TEST 7-8 STUDIES: CPT | Performed by: SPECIALIST

## 2020-08-11 PROCEDURE — 95886 MUSC TEST DONE W/N TEST COMP: CPT | Performed by: SPECIALIST

## 2020-08-11 NOTE — PROCEDURES
NERVE CONDUCTION STUDIES AND ELECTROMYOGRAPHY REPORT        08/11/20      Referring provider: Anabela Chong M.D.      SUMMARY OF PATIENT'S CLINICAL HISTORY,PHYSICAL EXAM, AND RATIONALE FOR TESTING:    Ms. Tamy Hines 19 y.o. presenting with bilateral hand pain.  The patient has tenderness over her carpal metacarpal joints and wrist extensor tendons.    Past Medical History is significant for :   Past Medical History:   Diagnosis Date   • Allergic rhinitis    • ASTHMA 8/15/2013    inhaler as needed   • Bowel habit changes     diarrhea   • Gynecological disorder     severe cramps and cysts   • Heart burn     knees, back, hands, stomach 4-10/10   • Indigestion    • Menarche 8/15/2013    Started at 10. Fairly regular.   • Nausea, vomiting and diarrhea 11/19/2014           The electrodiagnostic studies were performed to evaluate for possible carpal tunnel syndrome.      ELECTRODIAGNOSTIC EXAMINATION:  Nerve conduction studies (NCS) and electromyography (EMG) are utilized to evaluate direct or indirect damage to the peripheral nervous system. NCS are performed to measure the nerve(s) response(s) to electrostimulation across a given nerve segment. EMG evaluates the passive and active electrical activity of the muscle(s) in question.  Muscles are innervated by specific peripheral nerves and roots. Often times, several nerves the muscle to be examined in order to determine the presence or absence of the disease process. Furthermore, nerves and muscles may need to be tested in a [id number] comparison, as well as in additional extremities, as this may be crucial in characterizing the extent of the disease process, which may be diffuse or isolated and of varying degree of severity. The extent of the neurodiagnostic exam is justified as it may help arrive to a proper diagnosis, which ultimately may contribute to better management of the patient. Therefore, the nerves to muscles examined during the study were  medically necessary.    Unless otherwise noted, temperature of the extremity(s) study was monitored before and during the examination and remained between 32 and 36 degrees C for the upper extremities, and between 30 and 36 degrees C for the lower extremities.      NERVE CONDUCTION STUDIES:  Sensory nerves:  - Bilateral Median sensory nerves were examined.The responses were within normal limits.  - Bilateral Ulnar sensory nerves were examined. The responses were within normal limits.    Motor nerves:   - Bilateral Median motor nerves were examined. Recording electrodes placed at the Abductor Pollicis Brevis muscles. The responses were within normal limits.  - Bilateral Ulnar motor nerves were examined. Recording electrodes placed at the Abductor Digiti Minimi muscles. The responses were within normal limits.    No temporal dispersion or conduction block observed in any of the motor nerves examined.    LATE RESPONSES:  F waves were obtained and the following nerves: Bilateral median.  The responses within normal limits for the patient's age.          ELECTROMYOGRAPHY:  The study was performed the concentric needle electrode. Fibrillation and fasciculation activity is graded by convention from none (0) to continuous (4+).  Needle electrode examination was performed in the following muscles: Bilateral deltoid, biceps, triceps, first dorsal interosseous, and abductor pollicis brevis.  The muscles tested demonstrated normal insertional activity, normal motor unit morphology and recruitment. There were no elements suggestive of active denervation.      Nerve Conduction Studies     Stim Site NR Onset (ms) Norm Onset (ms) O-P Amp (µV) Norm O-P Amp Site1 Site2 Delta-P (ms) Dist (cm) Brian (m/s) Norm Brian (m/s)   Left Median Anti Sensory (2nd Digit)   Wrist    2.1 <3.3 44.3 >20 Wrist 2nd Digit 3.0 14.0 *47 >50   Right Median Anti Sensory (2nd Digit)   Wrist    2.2 <3.3 21.0 >20 Wrist 2nd Digit 2.8 14.0 50 >50   Left Ulnar Anti  Sensory (5th Digit)   Wrist    2.2 <3.0 40.3 >18 Wrist 5th Digit 3.0 14.0 *47 >50   Right Ulnar Anti Sensory (5th Digit)   Wrist    2.3 <3.0 29.7 >18 Wrist 5th Digit 3.1 14.0 *45 >50        Stim Site NR Onset (ms) Norm Onset (ms) O-P Amp (mV) Norm O-P Amp Site1 Site2 Delta-0 (ms) Dist (cm) Brian (m/s) Norm Brian (m/s)   Left Median Motor (Abd Poll Brev)   Wrist    2.8 <3.9 15.9 >6 Elbow Wrist 4.2 24.0 57 >50   Elbow    7.0  15.5          Right Median Motor (Abd Poll Brev)   Wrist    3.0 <3.9 15.1 >6 Elbow Wrist 4.2 25.0 60 >50   Elbow    7.2  14.5          Left Ulnar Motor (Abd Dig Min)   Wrist    2.5 <3 11.4 >8 B Elbow Wrist 3.4 20.0 59 >50   B Elbow    5.9  10.9  A Elbow B Elbow 1.2 10.0 83    A Elbow    7.1  11.1          Right Ulnar Motor (Abd Dig Min)   Wrist    2.6 <3 12.6 >8 B Elbow Wrist 2.9 19.0 66 >50   B Elbow    5.5  12.4  A Elbow B Elbow 1.2 10.0 83    A Elbow    6.7  12.7            F Wave Studies     NR F-Lat (ms) Lat Norm (ms)   Left Median (Abd Poll Brev)      24.53 <31   Right Median (Abd Poll Brev)      25.81 <31                               Electromyography     Side Muscle Nerve Root Ins Act Fibs Psw Amp Dur Poly Recrt Int Pat Comment   Right Deltoid Axillary C5-6 Nml Nml Nml Nml Nml 0 Nml Nml    Right Biceps Musculocut C5-6 Nml Nml Nml Nml Nml 0 Nml Nml    Right Triceps Radial C6-7-8 Nml Nml Nml Nml Nml 0 Nml Nml    Right 1stDorInt Ulnar C8-T1 Nml Nml Nml Nml Nml 0 Nml Nml    Right Abd Poll Brev Median C8-T1 Nml Nml Nml Nml Nml 0 Nml Nml    Left Deltoid Axillary C5-6 Nml Nml Nml Nml Nml 0 Nml Nml    Left Biceps Musculocut C5-6 Nml Nml Nml Nml Nml 0 Nml Nml    Left Triceps Radial C6-7-8 Nml Nml Nml Nml Nml 0 Nml Nml    Left 1stDorInt Ulnar C8-T1 Nml Nml Nml Nml Nml 0 Nml Nml    Left Abd Poll Brev Median C8-T1 Nml Nml Nml Nml Nml 0 Nml Nml            DIAGNOSTIC INTERPRETATION:   Extensive electrodiagnostic studies were performed to the bilateral upper extremities.  The results are as follows:    1.   Normal EMG and nerve conduction study right upper extremity.    2.  Normal EMG and nerve conduction study left upper extremity.        REJI Pelletier M.D.

## 2020-09-30 ENCOUNTER — HOSPITAL ENCOUNTER (OUTPATIENT)
Facility: MEDICAL CENTER | Age: 20
End: 2020-09-30
Attending: FAMILY MEDICINE
Payer: COMMERCIAL

## 2020-09-30 ENCOUNTER — OFFICE VISIT (OUTPATIENT)
Dept: MEDICAL GROUP | Facility: PHYSICIAN GROUP | Age: 20
End: 2020-09-30
Payer: COMMERCIAL

## 2020-09-30 VITALS
HEIGHT: 67 IN | RESPIRATION RATE: 16 BRPM | WEIGHT: 190 LBS | SYSTOLIC BLOOD PRESSURE: 120 MMHG | BODY MASS INDEX: 29.82 KG/M2 | OXYGEN SATURATION: 98 % | HEART RATE: 83 BPM | TEMPERATURE: 98 F | DIASTOLIC BLOOD PRESSURE: 86 MMHG

## 2020-09-30 DIAGNOSIS — Z11.1 VISIT FOR TB SKIN TEST: ICD-10-CM

## 2020-09-30 DIAGNOSIS — N92.6 IRREGULAR PERIODS: ICD-10-CM

## 2020-09-30 DIAGNOSIS — R82.90 ABNORMAL URINE ODOR: ICD-10-CM

## 2020-09-30 DIAGNOSIS — N30.01 ACUTE CYSTITIS WITH HEMATURIA: ICD-10-CM

## 2020-09-30 DIAGNOSIS — Z30.42 ENCOUNTER FOR SURVEILLANCE OF INJECTABLE CONTRACEPTIVE: ICD-10-CM

## 2020-09-30 DIAGNOSIS — Z11.3 SCREENING EXAMINATION FOR STD (SEXUALLY TRANSMITTED DISEASE): ICD-10-CM

## 2020-09-30 LAB
APPEARANCE UR: NORMAL
BILIRUB UR STRIP-MCNC: NORMAL MG/DL
COLOR UR AUTO: NORMAL
GLUCOSE UR STRIP.AUTO-MCNC: NORMAL MG/DL
INT CON NEG: NORMAL
INT CON POS: NORMAL
KETONES UR STRIP.AUTO-MCNC: NORMAL MG/DL
LEUKOCYTE ESTERASE UR QL STRIP.AUTO: NORMAL
NITRITE UR QL STRIP.AUTO: NORMAL
PH UR STRIP.AUTO: 5.5 [PH] (ref 5–8)
POC URINE PREGNANCY TEST: NORMAL
PROT UR QL STRIP: NORMAL MG/DL
RBC UR QL AUTO: NORMAL
SP GR UR STRIP.AUTO: 1.03
UROBILINOGEN UR STRIP-MCNC: 0.2 MG/DL

## 2020-09-30 PROCEDURE — 99214 OFFICE O/P EST MOD 30 MIN: CPT | Performed by: FAMILY MEDICINE

## 2020-09-30 PROCEDURE — 81002 URINALYSIS NONAUTO W/O SCOPE: CPT | Performed by: FAMILY MEDICINE

## 2020-09-30 PROCEDURE — 87491 CHLMYD TRACH DNA AMP PROBE: CPT

## 2020-09-30 PROCEDURE — 86580 TB INTRADERMAL TEST: CPT | Performed by: FAMILY MEDICINE

## 2020-09-30 PROCEDURE — 87591 N.GONORRHOEAE DNA AMP PROB: CPT

## 2020-09-30 PROCEDURE — 87086 URINE CULTURE/COLONY COUNT: CPT

## 2020-09-30 PROCEDURE — 99000 SPECIMEN HANDLING OFFICE-LAB: CPT | Performed by: FAMILY MEDICINE

## 2020-09-30 PROCEDURE — 87077 CULTURE AEROBIC IDENTIFY: CPT

## 2020-09-30 PROCEDURE — 81025 URINE PREGNANCY TEST: CPT | Performed by: FAMILY MEDICINE

## 2020-09-30 PROCEDURE — 87186 SC STD MICRODIL/AGAR DIL: CPT

## 2020-09-30 RX ORDER — NITROFURANTOIN 25; 75 MG/1; MG/1
100 CAPSULE ORAL 2 TIMES DAILY
Qty: 10 CAP | Refills: 0 | Status: SHIPPED
Start: 2020-09-30 | End: 2020-11-11 | Stop reason: SDUPTHER

## 2020-09-30 ASSESSMENT — FIBROSIS 4 INDEX: FIB4 SCORE: 0.34

## 2020-09-30 NOTE — ASSESSMENT & PLAN NOTE
Patient would like to restart depoprovera for contraception and also her periods on the depo stop which she prefers.   POCT urine pregnancy test: negative   Administered by MA  She does not smoke. No family history of dvt/clots.

## 2020-10-03 ENCOUNTER — NON-PROVIDER VISIT (OUTPATIENT)
Dept: URGENT CARE | Facility: PHYSICIAN GROUP | Age: 20
End: 2020-10-03

## 2020-10-03 LAB
BACTERIA UR CULT: ABNORMAL
BACTERIA UR CULT: ABNORMAL
SIGNIFICANT IND 70042: ABNORMAL
SITE SITE: ABNORMAL
SOURCE SOURCE: ABNORMAL
TB WHEAL 3D P 5 TU DIAM: 0 MM

## 2020-10-14 RX ORDER — MEDROXYPROGESTERONE ACETATE 150 MG/ML
150 INJECTION, SUSPENSION INTRAMUSCULAR ONCE
OUTPATIENT
Start: 2020-10-14 | End: 2020-10-15

## 2020-10-14 NOTE — PROGRESS NOTES
"Subjective:   Tamy Hines is a 19 y.o. female here today for evaluation and management of:     Encounter for surveillance of contraceptives  Patient would like to restart depoprovera for contraception and also her periods on the depo stop which she prefers.   POCT urine pregnancy test: negative   Administered by MA  She does not smoke. No family history of dvt/clots.          Current medicines (including changes today)  Current Outpatient Medications   Medication Sig Dispense Refill   • cetirizine (ZYRTEC) 10 MG Tab Take 1 Tab by mouth every day. 90 Tab 3   • amitriptyline (ELAVIL) 50 MG Tab Take 1 Tab by mouth every bedtime. 90 Tab 3   • ibuprofen (MOTRIN) 200 MG Tab Take 400 mg by mouth every 6 hours as needed.       Current Facility-Administered Medications   Medication Dose Route Frequency Provider Last Rate Last Dose   • medroxyPROGESTERone (DEPO-PROVERA) injection 150 mg  150 mg Intramuscular Once Anabela Chong M.D.         She  has a past medical history of Allergic rhinitis, ASTHMA (8/15/2013), Bowel habit changes, Gynecological disorder, Heart burn, Indigestion, Menarche (8/15/2013), and Nausea, vomiting and diarrhea (11/19/2014).    ROS  No chest pain, no shortness of breath, no abdominal pain       Objective:     /86   Pulse 83   Temp 36.7 °C (98 °F) (Temporal)   Resp 16   Ht 1.702 m (5' 7\")   Wt 86.2 kg (190 lb)   SpO2 98%  Body mass index is 29.76 kg/m².   Physical Exam:  Constitutional: Alert, no distress.  Skin: Warm, dry, good turgor, no rashes in visible areas.  Eye: Equal, round and reactive, conjunctiva clear, lids normal.  ENMT: Lips without lesions, good dentition, oropharynx clear.  Neck: Trachea midline, no masses, no thyromegaly. No cervical or supraclavicular lymphadenopathy  Respiratory: Unlabored respiratory effort, lungs clear to auscultation, no wheezes, no ronchi.  Cardiovascular: Normal S1, S2, no murmur, no edema.  Abdomen: Soft, non-tender, no masses, no " hepatosplenomegaly.  Psych: Alert and oriented x3, normal affect and mood.        Assessment and Plan:   The following treatment plan was discussed    1. Irregular periods  - POCT Pregnancy    2. Encounter for surveillance of injectable contraceptive    3. Screening examination for STD (sexually transmitted disease)  - HIV AG/AB COMBO ASSAY SCREENING; Future  - T.PALLIDUM AB EIA; Future  - Chlamydia/GC PCR Urine Or Swab; Future    4. Visit for TB skin test  - PPD SKIN TEST    5. Abnormal urine odor  - POCT Urinalysis    6. Acute cystitis with hematuria  - nitrofurantoin (MACROBID) 100 MG Cap; Take 1 Cap by mouth 2 times a day for 5 days.  Dispense: 10 Cap; Refill: 0  - URINE CULTURE(NEW); Future      Followup: No follow-ups on file.

## 2020-10-14 NOTE — RESULT ENCOUNTER NOTE
Tamy,  Your urine culture showed ecoli, the antibiotic you were provided was the correct one.   Anabela Chong M.D.

## 2020-10-30 ENCOUNTER — OFFICE VISIT (OUTPATIENT)
Dept: URGENT CARE | Facility: PHYSICIAN GROUP | Age: 20
End: 2020-10-30
Payer: COMMERCIAL

## 2020-10-30 VITALS
HEART RATE: 74 BPM | SYSTOLIC BLOOD PRESSURE: 122 MMHG | DIASTOLIC BLOOD PRESSURE: 70 MMHG | TEMPERATURE: 98.6 F | BODY MASS INDEX: 29.51 KG/M2 | WEIGHT: 188 LBS | OXYGEN SATURATION: 98 % | RESPIRATION RATE: 16 BRPM | HEIGHT: 67 IN

## 2020-10-30 DIAGNOSIS — H15.002 SCLERITIS OF LEFT EYE: ICD-10-CM

## 2020-10-30 DIAGNOSIS — H10.9 CONJUNCTIVITIS OF LEFT EYE, UNSPECIFIED CONJUNCTIVITIS TYPE: ICD-10-CM

## 2020-10-30 DIAGNOSIS — H16.002 CORNEAL ULCERATION, LEFT: ICD-10-CM

## 2020-10-30 PROCEDURE — 99214 OFFICE O/P EST MOD 30 MIN: CPT | Performed by: NURSE PRACTITIONER

## 2020-10-30 RX ORDER — TOBRAMYCIN AND DEXAMETHASONE 3; 1 MG/ML; MG/ML
1 SUSPENSION/ DROPS OPHTHALMIC
Qty: 10 ML | Refills: 0 | Status: SHIPPED
Start: 2020-10-30 | End: 2020-11-06 | Stop reason: SDUPTHER

## 2020-10-30 ASSESSMENT — VISUAL ACUITY: OU: 1

## 2020-10-30 ASSESSMENT — ENCOUNTER SYMPTOMS
HEADACHES: 0
ABDOMINAL PAIN: 0
DIARRHEA: 0
EYE PAIN: 1
DOUBLE VISION: 0
FEVER: 0
NAUSEA: 1
DIZZINESS: 0
BLURRED VISION: 1
VOMITING: 0
EYE REDNESS: 1
PHOTOPHOBIA: 1
CHILLS: 0
EYE DISCHARGE: 1

## 2020-10-30 ASSESSMENT — FIBROSIS 4 INDEX: FIB4 SCORE: 0.34

## 2020-10-30 NOTE — LETTER
October 30, 2020         Patient: Tamy Hines   YOB: 2000   Date of Visit: 10/30/2020           To Whom it May Concern:    Tamy Hines was seen in my clinic on 10/30/2020. She may return to work on 11/3/2020    If you have any questions or concerns, please don't hesitate to call.        Sincerely,           ROSHNI Truong.  Electronically Signed

## 2020-10-30 NOTE — PROGRESS NOTES
Subjective:   Tamy Hines is a 19 y.o. female who presents for Eye Pain (redness and pain (L) eye-states vision impaired )      HPI  19-year-old female patient reports urgent care for new problem that started yesterday.  Patient states she had diminished vision and increase in pain in her left eye with purulent drainage..  States that she feels like she got a piece of glitter in it on her friend and her make-up within the last week and got caught underneath her contact.  Patient has not worn a contact lens today.  Has had some nausea denies dizziness, headache. Has used moisturizing eye drops with no relief of symptom. States that she tried to get into to eye doctor but they were unavailable today.     Review of Systems   Constitutional: Negative for chills and fever.   Eyes: Positive for blurred vision, photophobia, pain, discharge and redness. Negative for double vision.   Gastrointestinal: Positive for nausea. Negative for abdominal pain, diarrhea and vomiting.   Neurological: Negative for dizziness and headaches.       Patient Active Problem List   Diagnosis   • Decreased hearing of right ear   • Multiple joint pain   • Dysmenorrhea   • Irregular menstrual cycle   • Asthma, mild intermittent, well-controlled   • Vitamin D deficiency disease   • Abdominal cramps   • Eczema   • Gastroesophageal reflux disease with esophagitis   • Food allergy   • Seasonal allergic rhinitis   • Functional abdominal pain syndrome   • Cephalalgia   • Primary insomnia   • Mood changes   • Chronic intractable headache   • Anxiety, generalized   • Irritable bowel syndrome   • Pain in both wrists   • Pharyngitis   • Bilateral hearing loss   • Dizziness   • Nausea   • Acute swimmer's ear of both sides   • Encounter for surveillance of contraceptives   • Plantar wart   • Pain in both hands   • Rib pain   • Seasonal allergies     Past Surgical History:   Procedure Laterality Date   • SOHAM BY LAPAROSCOPY N/A 12/17/2015     "Procedure: SOHAM BY LAPAROSCOPY ;  Surgeon: Yuliet Dale M.D.;  Location: SURGERY [facility];  Service:    • COLONOSCOPY  10/2015   • ENDOSCOPY  08/2015     Social History     Tobacco Use   • Smoking status: Never Smoker   • Smokeless tobacco: Never Used   Substance Use Topics   • Alcohol use: Yes     Alcohol/week: 0.0 oz     Comment: Social   • Drug use: No      Family History   Adopted: Yes   Problem Relation Age of Onset   • Other Mother         adopted   • Other Father         adopted      (Allergies, Medications, & Tobacco/Substance Use were reconciled by the Medical Assistant and reviewed by myself. The family history is prepopulated)     Objective:     /70   Pulse 74   Temp 37 °C (98.6 °F)   Resp 16   Ht 1.702 m (5' 7\")   Wt 85.3 kg (188 lb)   SpO2 98%   BMI 29.44 kg/m²     Physical Exam  Vitals signs reviewed.   Constitutional:       Appearance: Normal appearance.   Eyes:      General: Lids are everted, no foreign bodies appreciated. Vision grossly intact. Gaze aligned appropriately.      Extraocular Movements: Extraocular movements intact.      Conjunctiva/sclera:      Left eye: Left conjunctiva is injected. Exudate present. No hemorrhage.     Pupils: Pupils are equal, round, and reactive to light.        Comments: Purulent drainage noted to left eye  Has iris fold to the 10 o'clock position of left eye   Cardiovascular:      Rate and Rhythm: Normal rate and regular rhythm.      Heart sounds: Normal heart sounds.   Pulmonary:      Effort: Pulmonary effort is normal.      Breath sounds: Normal breath sounds.   Skin:     General: Skin is warm.   Neurological:      Mental Status: She is alert and oriented to person, place, and time.   Psychiatric:         Mood and Affect: Mood normal.         Behavior: Behavior normal.         Thought Content: Thought content normal.         Judgment: Judgment normal.         Assessment/Plan:     1. Corneal ulceration, left  tobramycin-dexamethasone " (TOBRADEX) 0.3-0.1 % Suspension   2. Conjunctivitis of left eye, unspecified conjunctivitis type  tobramycin-dexamethasone (TOBRADEX) 0.3-0.1 % Suspension   3. Scleritis of left eye  tobramycin-dexamethasone (TOBRADEX) 0.3-0.1 % Suspension     Discussed PE findings, patient presentation is consistent with scleritis, conjunctivitis and corneal ulceration. Will provide combination steroid/antibiotic eyedrops and instructed patient to continue to not wear contact lens in that eye. Advised to follow up early next week with eye doctor. Discussed supportive care including continuation of moisturizing eye drop, OTC NSAIDS and tylenol per 's instructions, warm water compresses, avoidance of eye makeup and ice.     Differential diagnosis, natural history, supportive care, and indications for immediate follow-up discussed.    Advised the patient to follow-up with the primary care physician for recheck, reevaluation, and consideration of further management.    Please note that this dictation was created using voice recognition software. I have made a reasonable attempt to correct obvious errors, but I expect that there are errors of grammar and possibly content that I did not discover before finalizing the note.    This note was electronically signed GOLDEN Quiroga

## 2020-11-06 ENCOUNTER — HOSPITAL ENCOUNTER (OUTPATIENT)
Facility: MEDICAL CENTER | Age: 20
End: 2020-11-06
Attending: FAMILY MEDICINE
Payer: COMMERCIAL

## 2020-11-06 ENCOUNTER — OFFICE VISIT (OUTPATIENT)
Dept: MEDICAL GROUP | Facility: PHYSICIAN GROUP | Age: 20
End: 2020-11-06
Payer: COMMERCIAL

## 2020-11-06 VITALS
TEMPERATURE: 97.6 F | SYSTOLIC BLOOD PRESSURE: 112 MMHG | BODY MASS INDEX: 29.35 KG/M2 | HEIGHT: 67 IN | DIASTOLIC BLOOD PRESSURE: 72 MMHG | WEIGHT: 187 LBS | OXYGEN SATURATION: 96 % | RESPIRATION RATE: 16 BRPM | HEART RATE: 91 BPM

## 2020-11-06 DIAGNOSIS — R30.0 DYSURIA: ICD-10-CM

## 2020-11-06 DIAGNOSIS — N30.00 ACUTE CYSTITIS WITHOUT HEMATURIA: ICD-10-CM

## 2020-11-06 DIAGNOSIS — H10.9 CONJUNCTIVITIS OF LEFT EYE, UNSPECIFIED CONJUNCTIVITIS TYPE: ICD-10-CM

## 2020-11-06 DIAGNOSIS — Z23 NEED FOR VACCINATION: ICD-10-CM

## 2020-11-06 DIAGNOSIS — H16.002 CORNEAL ULCERATION, LEFT: ICD-10-CM

## 2020-11-06 DIAGNOSIS — H16.002 ULCER OF LEFT CORNEA: ICD-10-CM

## 2020-11-06 DIAGNOSIS — H44.002 INFECTION OF LEFT EYE: ICD-10-CM

## 2020-11-06 DIAGNOSIS — H15.002 SCLERITIS OF LEFT EYE: ICD-10-CM

## 2020-11-06 DIAGNOSIS — N30.01 ACUTE CYSTITIS WITH HEMATURIA: ICD-10-CM

## 2020-11-06 DIAGNOSIS — K58.0 IRRITABLE BOWEL SYNDROME WITH DIARRHEA: ICD-10-CM

## 2020-11-06 LAB
APPEARANCE UR: NORMAL
BILIRUB UR STRIP-MCNC: NORMAL MG/DL
COLOR UR AUTO: NORMAL
GLUCOSE UR STRIP.AUTO-MCNC: NORMAL MG/DL
KETONES UR STRIP.AUTO-MCNC: NORMAL MG/DL
LEUKOCYTE ESTERASE UR QL STRIP.AUTO: NORMAL
NITRITE UR QL STRIP.AUTO: NORMAL
PH UR STRIP.AUTO: 6 [PH] (ref 5–8)
PROT UR QL STRIP: NORMAL MG/DL
RBC UR QL AUTO: NORMAL
SP GR UR STRIP.AUTO: 1.02
UROBILINOGEN UR STRIP-MCNC: 1 MG/DL

## 2020-11-06 PROCEDURE — 87086 URINE CULTURE/COLONY COUNT: CPT

## 2020-11-06 PROCEDURE — 90471 IMMUNIZATION ADMIN: CPT | Performed by: FAMILY MEDICINE

## 2020-11-06 PROCEDURE — 81002 URINALYSIS NONAUTO W/O SCOPE: CPT | Performed by: FAMILY MEDICINE

## 2020-11-06 PROCEDURE — 90686 IIV4 VACC NO PRSV 0.5 ML IM: CPT | Performed by: FAMILY MEDICINE

## 2020-11-06 PROCEDURE — 99214 OFFICE O/P EST MOD 30 MIN: CPT | Mod: 25 | Performed by: FAMILY MEDICINE

## 2020-11-06 RX ORDER — SULFAMETHOXAZOLE AND TRIMETHOPRIM 800; 160 MG/1; MG/1
1 TABLET ORAL 2 TIMES DAILY
Qty: 14 TAB | Refills: 0 | Status: SHIPPED
Start: 2020-11-06 | End: 2020-11-13

## 2020-11-06 RX ORDER — TOBRAMYCIN AND DEXAMETHASONE 3; 1 MG/ML; MG/ML
1 SUSPENSION/ DROPS OPHTHALMIC
Qty: 10 ML | Refills: 0 | Status: SHIPPED
Start: 2020-11-06 | End: 2021-06-18

## 2020-11-06 ASSESSMENT — FIBROSIS 4 INDEX: FIB4 SCORE: 0.34

## 2020-11-06 NOTE — ASSESSMENT & PLAN NOTE
For a week she has conjuctivitis, ulceration of left cornea painful worse with wearing contacts, was temp improved with abx eye drops  Location was medial edge of iris.   Ref urgent to ophthalmologist  No contacts,   Refill of tobradex eye drops.   Letter off work till glasses arrive.   ER precautions discussed with any worsenign pain, redness, vision chagnes.

## 2020-11-06 NOTE — PATIENT INSTRUCTIONS
"Diet for Irritable Bowel Syndrome  When you have irritable bowel syndrome (IBS), it is very important to eat the foods and follow the eating habits that are best for your condition. IBS may cause various symptoms such as pain in the abdomen, constipation, or diarrhea. Choosing the right foods can help to ease the discomfort from these symptoms. Work with your health care provider and diet and nutrition specialist (dietitian) to find the eating plan that will help to control your symptoms.  What are tips for following this plan?         · Keep a food diary. This will help you identify foods that cause symptoms. Write down:  ? What you eat and when you eat it.  ? What symptoms you have.  ? When symptoms occur in relation to your meals, such as \"pain in abdomen 2 hours after dinner.\"  · Eat your meals slowly and in a relaxed setting.  · Aim to eat 5-6 small meals per day. Do not skip meals.  · Drink enough fluid to keep your urine pale yellow.  · Ask your health care provider if you should take an over-the-counter probiotic to help restore healthy bacteria in your gut (digestive tract).  ? Probiotics are foods that contain good bacteria and yeasts.  · Your dietitian may have specific dietary recommendations for you based on your symptoms. He or she may recommend that you:  ? Avoid foods that cause symptoms. Talk with your dietitian about other ways to get the same nutrients that are in those problem foods.  ? Avoid foods with gluten. Gluten is a protein that is found in rye, wheat, and barley.  ? Eat more foods that contain soluble fiber. Examples of foods with high soluble fiber include oats, seeds, and certain fruits and vegetables. Take a fiber supplement if directed by your dietitian.  ? Reduce or avoid certain foods called FODMAPs. These are foods that contain carbohydrates that are hard to digest. Ask your doctor which foods contain these carbohydrates.  What foods are not recommended?  The following are some " foods and drinks that may make your symptoms worse:  · Fatty foods, such as french fries.  · Foods that contain gluten, such as pasta and cereal.  · Dairy products, such as milk, cheese, and ice cream.  · Chocolate.  · Alcohol.  · Products with caffeine, such as coffee.  · Carbonated drinks, such as soda.  · Foods that are high in FODMAPs. These include certain fruits and vegetables.  · Products with sweeteners such as honey, high fructose corn syrup, sorbitol, and mannitol.  The items listed above may not be a complete list of foods and beverages you should avoid. Contact a dietitian for more information.  What foods are good sources of fiber?  Your health care provider or dietitian may recommend that you eat more foods that contain fiber. Fiber can help to reduce constipation and other IBS symptoms. Add foods with fiber to your diet a little at a time so your body can get used to them. Too much fiber at one time might cause gas and swelling of your abdomen. The following are some foods that are good sources of fiber:  · Berries, such as raspberries, strawberries, and blueberries.  · Tomatoes.  · Carrots.  · Brown rice.  · Oats.  · Seeds, such as ama and pumpkin seeds.  The items listed above may not be a complete list of recommended sources of fiber. Contact your dietitian for more options.  Where to find more information  · International Foundation for Functional Gastrointestinal Disorders: www.iffgd.org  · National [place] of Diabetes and Digestive and Kidney Diseases: www.niddk.nih.gov  Summary  · When you have irritable bowel syndrome (IBS), it is very important to eat the foods and follow the eating habits that are best for your condition.  · IBS may cause various symptoms such as pain in the abdomen, constipation, or diarrhea.  · Choosing the right foods can help to ease the discomfort that comes from symptoms.  · Keep a food diary. This will help you identify foods that cause symptoms.  · Your health  care provider or diet and nutrition specialist (dietitian) may recommend that you eat more foods that contain fiber.  This information is not intended to replace advice given to you by your health care provider. Make sure you discuss any questions you have with your health care provider.  Document Released: 03/09/2005 Document Revised: 04/08/2020 Document Reviewed: 08/21/2018  Elsevier Patient Education © 2020 Elsevier Inc.

## 2020-11-06 NOTE — ASSESSMENT & PLAN NOTE
Ecoli uti recently treated with 3 days macrobid, symptoms came back once abx stopped.   Needs longer course  Will treat with bactrim x 7 days to make sure no abx resistant.   Increase freq, abd pain are pts symptoms, she has no fevers, chills or vomiting.   Encouraged hydration.   Wipe front to back   Wear loose clothing.

## 2020-11-06 NOTE — LETTER
November 6, 2020    To whom it may concern:    Tamy Hines is my patient in clinic, and was examined by me today. For medical reasons please excuse her from work from 11/6/2020 11/13/2020.     Please contact me with any questions.     Thank you,             Anabela Chong M.D.

## 2020-11-08 LAB
BACTERIA UR CULT: NORMAL
SIGNIFICANT IND 70042: NORMAL
SITE SITE: NORMAL
SOURCE SOURCE: NORMAL

## 2020-11-11 RX ORDER — NITROFURANTOIN 25; 75 MG/1; MG/1
100 CAPSULE ORAL 2 TIMES DAILY
Qty: 14 CAP | Refills: 0 | Status: SHIPPED
Start: 2020-11-11 | End: 2020-11-12 | Stop reason: SDUPTHER

## 2020-11-12 DIAGNOSIS — N30.01 ACUTE CYSTITIS WITH HEMATURIA: ICD-10-CM

## 2020-11-12 RX ORDER — NITROFURANTOIN 25; 75 MG/1; MG/1
100 CAPSULE ORAL 2 TIMES DAILY
Qty: 14 CAP | Refills: 0 | Status: SHIPPED
Start: 2020-11-12 | End: 2020-11-19

## 2020-11-12 NOTE — PROGRESS NOTES
"Subjective:   Tamy Hines is a 19 y.o. female here today for evaluation and management of:     Ulcer of left cornea  For a week she has conjuctivitis, ulceration of left cornea painful worse with wearing contacts, was temp improved with abx eye drops  Location was medial edge of iris.   Ref urgent to ophthalmologist  No contacts,   Refill of tobradex eye drops.   Letter off work till glasses arrive.   ER precautions discussed with any worsenign pain, redness, vision chagnes.       Acute cystitis without hematuria  Ecoli uti recently treated with 3 days macrobid, symptoms came back once abx stopped.   Needs longer course  Will treat with bactrim x 7 days to make sure no abx resistant.   Increase freq, abd pain are pts symptoms, she has no fevers, chills or vomiting.   Encouraged hydration.   Wipe front to back   Wear loose clothing.            Current medicines (including changes today)  Current Outpatient Medications   Medication Sig Dispense Refill   • sulfamethoxazole-trimethoprim (BACTRIM DS) 800-160 MG tablet Take 1 Tab by mouth 2 times a day for 7 days. 14 Tab 0   • tobramycin-dexamethasone (TOBRADEX) 0.3-0.1 % Suspension Place 1 Drop in both eyes every 4 hours while awake. 10 mL 0   • cetirizine (ZYRTEC) 10 MG Tab Take 1 Tab by mouth every day. 90 Tab 3   • amitriptyline (ELAVIL) 50 MG Tab Take 1 Tab by mouth every bedtime. 90 Tab 3   • ibuprofen (MOTRIN) 200 MG Tab Take 400 mg by mouth every 6 hours as needed.       No current facility-administered medications for this visit.      She  has a past medical history of Allergic rhinitis, ASTHMA (8/15/2013), Bowel habit changes, Gynecological disorder, Heart burn, Indigestion, Menarche (8/15/2013), and Nausea, vomiting and diarrhea (11/19/2014).    ROS  No chest pain, no shortness of breath, no abdominal pain       Objective:     /72   Pulse 91   Temp 36.4 °C (97.6 °F) (Temporal)   Resp 16   Ht 1.702 m (5' 7\")   Wt 84.8 kg (187 lb)   SpO2 96% "  Body mass index is 29.29 kg/m².   Physical Exam:  Constitutional: Alert, no distress.  Skin: Warm, dry, good turgor, no rashes in visible areas.  Eye: Equal, round and reactive, conjunctiva clear, lids normal. Left iris with a fold medial at about 10 o'clock position.   ENMT: Lips without lesions, good dentition, oropharynx clear.  Neck: Trachea midline, no masses, no thyromegaly. No cervical or supraclavicular lymphadenopathy  Respiratory: Unlabored respiratory effort, lungs clear to auscultation, no wheezes, no ronchi.  Cardiovascular: Normal S1, S2, no murmur, no edema.  Abdomen: Soft, non-tender, no masses, no hepatosplenomegaly.  Psych: Alert and oriented x3, normal affect and mood.        Assessment and Plan:   The following treatment plan was discussed    1. Dysuria  - POCT Urinalysis  - sulfamethoxazole-trimethoprim (BACTRIM DS) 800-160 MG tablet; Take 1 Tab by mouth 2 times a day for 7 days.  Dispense: 14 Tab; Refill: 0  - URINE CULTURE(NEW); Future    2. Acute cystitis without hematuria  - sulfamethoxazole-trimethoprim (BACTRIM DS) 800-160 MG tablet; Take 1 Tab by mouth 2 times a day for 7 days.  Dispense: 14 Tab; Refill: 0  - URINE CULTURE(NEW); Future    3. Corneal ulceration, left  - tobramycin-dexamethasone (TOBRADEX) 0.3-0.1 % Suspension; Place 1 Drop in both eyes every 4 hours while awake.  Dispense: 10 mL; Refill: 0  - REFERRAL TO OPHTHALMOLOGY    4. Conjunctivitis of left eye, unspecified conjunctivitis type  - tobramycin-dexamethasone (TOBRADEX) 0.3-0.1 % Suspension; Place 1 Drop in both eyes every 4 hours while awake.  Dispense: 10 mL; Refill: 0  - REFERRAL TO OPHTHALMOLOGY    5. Scleritis of left eye  - tobramycin-dexamethasone (TOBRADEX) 0.3-0.1 % Suspension; Place 1 Drop in both eyes every 4 hours while awake.  Dispense: 10 mL; Refill: 0  - REFERRAL TO OPHTHALMOLOGY    6. Infection of left eye  - tobramycin-dexamethasone (TOBRADEX) 0.3-0.1 % Suspension; Place 1 Drop in both eyes every 4 hours  while awake.  Dispense: 10 mL; Refill: 0  - REFERRAL TO OPHTHALMOLOGY    7. Ulcer of left cornea    8. Irritable bowel syndrome with diarrhea  - REFERRAL TO GI FOR COLONOSCOPY    9. Need for vaccination  - Influenza Vaccine Quad Injection (PF)      Followup: Return in about 3 months (around 2/6/2021), or if symptoms worsen or fail to improve, for eye inflammation, ibs, uti, influenza vacc for both Erin.

## 2020-12-11 ENCOUNTER — OFFICE VISIT (OUTPATIENT)
Dept: MEDICAL GROUP | Facility: PHYSICIAN GROUP | Age: 20
End: 2020-12-11
Payer: COMMERCIAL

## 2020-12-11 ENCOUNTER — HOSPITAL ENCOUNTER (OUTPATIENT)
Facility: MEDICAL CENTER | Age: 20
End: 2020-12-11
Attending: FAMILY MEDICINE
Payer: COMMERCIAL

## 2020-12-11 VITALS
BODY MASS INDEX: 29.66 KG/M2 | TEMPERATURE: 97.3 F | WEIGHT: 189 LBS | RESPIRATION RATE: 16 BRPM | SYSTOLIC BLOOD PRESSURE: 110 MMHG | OXYGEN SATURATION: 96 % | DIASTOLIC BLOOD PRESSURE: 72 MMHG | HEIGHT: 67 IN | HEART RATE: 87 BPM

## 2020-12-11 DIAGNOSIS — R35.0 INCREASED URINARY FREQUENCY: ICD-10-CM

## 2020-12-11 LAB
APPEARANCE UR: CLEAR
BILIRUB UR STRIP-MCNC: NORMAL MG/DL
COLOR UR AUTO: YELLOW
GLUCOSE UR STRIP.AUTO-MCNC: NORMAL MG/DL
KETONES UR STRIP.AUTO-MCNC: NORMAL MG/DL
LEUKOCYTE ESTERASE UR QL STRIP.AUTO: NORMAL
NITRITE UR QL STRIP.AUTO: NORMAL
PH UR STRIP.AUTO: 5.5 [PH] (ref 5–8)
PROT UR QL STRIP: NORMAL MG/DL
RBC UR QL AUTO: NORMAL
SP GR UR STRIP.AUTO: 1.03
UROBILINOGEN UR STRIP-MCNC: 0.2 MG/DL

## 2020-12-11 PROCEDURE — 81002 URINALYSIS NONAUTO W/O SCOPE: CPT | Performed by: FAMILY MEDICINE

## 2020-12-11 PROCEDURE — 99214 OFFICE O/P EST MOD 30 MIN: CPT | Performed by: FAMILY MEDICINE

## 2020-12-11 PROCEDURE — 87086 URINE CULTURE/COLONY COUNT: CPT

## 2020-12-11 ASSESSMENT — FIBROSIS 4 INDEX: FIB4 SCORE: 0.34

## 2020-12-13 LAB
BACTERIA UR CULT: NORMAL
SIGNIFICANT IND 70042: NORMAL
SITE SITE: NORMAL
SOURCE SOURCE: NORMAL

## 2020-12-17 ENCOUNTER — APPOINTMENT (OUTPATIENT)
Dept: MEDICAL GROUP | Facility: PHYSICIAN GROUP | Age: 20
End: 2020-12-17
Payer: COMMERCIAL

## 2020-12-22 DIAGNOSIS — R30.0 DYSURIA: ICD-10-CM

## 2021-02-22 ENCOUNTER — NON-PROVIDER VISIT (OUTPATIENT)
Dept: MEDICAL GROUP | Facility: PHYSICIAN GROUP | Age: 21
End: 2021-02-22
Payer: COMMERCIAL

## 2021-02-22 DIAGNOSIS — Z30.42 ENCOUNTER FOR SURVEILLANCE OF INJECTABLE CONTRACEPTIVE: ICD-10-CM

## 2021-02-22 LAB
INT CON NEG: NORMAL
INT CON POS: NORMAL
POC URINE PREGNANCY TEST: NORMAL

## 2021-02-22 PROCEDURE — 96372 THER/PROPH/DIAG INJ SC/IM: CPT | Performed by: NURSE PRACTITIONER

## 2021-02-22 PROCEDURE — 81025 URINE PREGNANCY TEST: CPT | Performed by: NURSE PRACTITIONER

## 2021-02-22 RX ORDER — MEDROXYPROGESTERONE ACETATE 150 MG/ML
150 INJECTION, SUSPENSION INTRAMUSCULAR ONCE
Status: COMPLETED | OUTPATIENT
Start: 2021-02-22 | End: 2021-02-22

## 2021-02-22 RX ADMIN — MEDROXYPROGESTERONE ACETATE 150 MG: 150 INJECTION, SUSPENSION INTRAMUSCULAR at 14:42

## 2021-02-22 NOTE — NON-PROVIDER
PemaRobertAlexandra Hines is a 20 y.o. here for a Depo Provera Injection.     Date of last Depo Provera Injection: 10/2020  Current date within therapeutic range?: No   Urine pregnancy test done (needed if out of date range): yes--NEG  Date of office visit:12/11/2020  Date of last pap (if > 21 years old)/ GYN exam: NA  Dx: Contraceptive use    Order and dose verified by: PIPPA  Patient tolerated injection and no adverse effects were observed or reported: Yes    # of Administrations remaining in MAR: 0  Next injection due between 05/10 and 05/24.

## 2021-04-22 ENCOUNTER — HOSPITAL ENCOUNTER (OUTPATIENT)
Facility: MEDICAL CENTER | Age: 21
End: 2021-04-22
Attending: PHYSICIAN ASSISTANT
Payer: COMMERCIAL

## 2021-04-22 ENCOUNTER — OFFICE VISIT (OUTPATIENT)
Dept: URGENT CARE | Facility: PHYSICIAN GROUP | Age: 21
End: 2021-04-22
Payer: COMMERCIAL

## 2021-04-22 VITALS
WEIGHT: 189 LBS | BODY MASS INDEX: 29.66 KG/M2 | HEIGHT: 67 IN | SYSTOLIC BLOOD PRESSURE: 122 MMHG | TEMPERATURE: 98.1 F | DIASTOLIC BLOOD PRESSURE: 64 MMHG | RESPIRATION RATE: 14 BRPM | OXYGEN SATURATION: 98 % | HEART RATE: 88 BPM

## 2021-04-22 DIAGNOSIS — R05.9 COUGH: ICD-10-CM

## 2021-04-22 DIAGNOSIS — J98.01 BRONCHOSPASM: ICD-10-CM

## 2021-04-22 DIAGNOSIS — J45.21 MILD INTERMITTENT ASTHMA WITH EXACERBATION: ICD-10-CM

## 2021-04-22 DIAGNOSIS — Z87.09 HISTORY OF ALLERGIC RHINITIS: ICD-10-CM

## 2021-04-22 LAB — COVID ORDER STATUS COVID19: NORMAL

## 2021-04-22 PROCEDURE — U0005 INFEC AGEN DETEC AMPLI PROBE: HCPCS

## 2021-04-22 PROCEDURE — U0003 INFECTIOUS AGENT DETECTION BY NUCLEIC ACID (DNA OR RNA); SEVERE ACUTE RESPIRATORY SYNDROME CORONAVIRUS 2 (SARS-COV-2) (CORONAVIRUS DISEASE [COVID-19]), AMPLIFIED PROBE TECHNIQUE, MAKING USE OF HIGH THROUGHPUT TECHNOLOGIES AS DESCRIBED BY CMS-2020-01-R: HCPCS

## 2021-04-22 PROCEDURE — 99214 OFFICE O/P EST MOD 30 MIN: CPT | Performed by: PHYSICIAN ASSISTANT

## 2021-04-22 RX ORDER — DEXTROMETHORPHAN HYDROBROMIDE AND PROMETHAZINE HYDROCHLORIDE 15; 6.25 MG/5ML; MG/5ML
5 SYRUP ORAL EVERY 4 HOURS PRN
Qty: 120 ML | Refills: 0 | Status: SHIPPED
Start: 2021-04-22 | End: 2021-06-18

## 2021-04-22 RX ORDER — ALBUTEROL SULFATE 90 UG/1
2 AEROSOL, METERED RESPIRATORY (INHALATION) EVERY 6 HOURS PRN
Qty: 8.5 G | Refills: 2 | Status: SHIPPED
Start: 2021-04-22 | End: 2022-01-19

## 2021-04-22 RX ORDER — FLUTICASONE PROPIONATE 50 MCG
2 SPRAY, SUSPENSION (ML) NASAL DAILY
Qty: 16 G | Refills: 0 | Status: SHIPPED
Start: 2021-04-22 | End: 2022-01-19

## 2021-04-22 RX ORDER — BENZONATATE 100 MG/1
100 CAPSULE ORAL 3 TIMES DAILY PRN
Qty: 30 CAPSULE | Refills: 0 | Status: SHIPPED
Start: 2021-04-22 | End: 2021-06-18

## 2021-04-22 ASSESSMENT — ENCOUNTER SYMPTOMS
SPUTUM PRODUCTION: 0
NAUSEA: 0
WHEEZING: 1
COUGH: 1
SHORTNESS OF BREATH: 0
FOCAL WEAKNESS: 0
SORE THROAT: 0
SINUS PAIN: 0
FEVER: 0
ABDOMINAL PAIN: 0
VOMITING: 0
CHILLS: 0
SENSORY CHANGE: 0
DIARRHEA: 0

## 2021-04-22 NOTE — LETTER
April 22, 2021       Patient: Tamy Hines   YOB: 2000   Date of Visit: 4/22/2021         To Whom It May Concern:     A concern for COVID-19 has been identified and testing is in progress.?   ?  We are asking you to excuse absences while following self-isolation protocol per Center for Disease Control (CDC) guidelines. Your employee/student will be able to access test results through our electronic delivery system called [employer].?   ?  If the results of testing are negative, and once there has been no fever (temperature >100.4 F) for at least 72 hours without treatment, and no vomiting or diarrhea for at least 48 hours, then return to work/school is approved.   ?  If the results of testing are positive then your employee/student will be contacted by the [facility] or [facility] for further instructions on duration of self-isolation and return to work/school protocol. In general, this will also follow the CDC guidelines with a minimum of 10 days from the onset of symptoms and without fever, vomiting, or diarrhea as above.?   ?  In general, repeat testing is not necessary and not offered through our [facility] care.?   ?  This is the only note that will be provided from [facility] for this visit. Your employee/student will require an appointment with a primary care provider if FMLA or disability forms are required.   ?      If you have any questions or concerns, please don't hesitate to call [phone]          Sincerely,          Galindo Weinberg P.A.-C.  Electronically Signed

## 2021-04-22 NOTE — PROGRESS NOTES
Subjective:   Tamy Hines  is a 20 y.o. female who presents for Cough (x 1 week , hot flashes, chills , ) and Chest Pain (1 month, need note for work)      Cough  This is a recurrent problem. The current episode started more than 1 month ago. Associated symptoms include wheezing. Pertinent negatives include no chest pain ( tightness / wheeze w/ inspiration), chills, ear pain, fever, rash, sore throat or shortness of breath.   Chest Pain   Associated symptoms include a cough. Pertinent negatives include no abdominal pain, fever, nausea, shortness of breath, sputum production or vomiting.     Patient presents to urgent care complaining of persisting cough times last few weeks.  She notes congestion in the sinuses and fullness to ears bilaterally.  She notes tightness to chest associated with deep breathing and wheeze.  She states this is alleviated with an inhaler that she has at home but it has  so she has been trying to avoid missing.  She denies exertional chest pain.  She states she notes the most with the above-mentioned deep breathing and associated wheeze.  She denies history of pneumonia but notes history of asthma as well as bronchitis.  She has been taking using an antihistamine with nasal spray for managing allergic rhinitis.  She denies nausea vomiting abdominal pain diarrhea or rash. She denies loss of taste or smell.  Denies history of Covid vaccine or illness.  Denies exposures of particular concern.  Denies swelling to legs or shortness of breath.  Denies history of DVT PE clotting disorders or bleeding disorders.    Review of Systems   Constitutional: Negative for chills and fever.   HENT: Positive for congestion. Negative for ear pain, sinus pain and sore throat.    Respiratory: Positive for cough and wheezing. Negative for sputum production and shortness of breath.    Cardiovascular: Negative for chest pain ( tightness / wheeze w/ inspiration).   Gastrointestinal: Negative for  "abdominal pain, diarrhea, nausea and vomiting.   Skin: Negative for rash.   Neurological: Negative for sensory change and focal weakness.       No Known Allergies     Objective:   /64   Pulse 88   Temp 36.7 °C (98.1 °F) (Temporal)   Resp 14   Ht 1.702 m (5' 7\")   Wt 85.7 kg (189 lb)   SpO2 98%   BMI 29.60 kg/m²     Physical Exam  Vitals and nursing note reviewed.   Constitutional:       General: She is not in acute distress.     Appearance: She is well-developed. She is not diaphoretic.   HENT:      Head: Normocephalic and atraumatic.      Right Ear: Tympanic membrane, ear canal and external ear normal.      Left Ear: Tympanic membrane, ear canal and external ear normal.      Nose: Nose normal.      Right Sinus: No maxillary sinus tenderness or frontal sinus tenderness.      Left Sinus: No maxillary sinus tenderness or frontal sinus tenderness.      Mouth/Throat:      Lips: Pink.      Mouth: Mucous membranes are moist.      Pharynx: Uvula midline. Posterior oropharyngeal erythema ( moderate PND ) present. No oropharyngeal exudate.      Tonsils: No tonsillar abscesses.   Eyes:      General: Lids are normal. No scleral icterus.        Right eye: No discharge.         Left eye: No discharge.      Conjunctiva/sclera: Conjunctivae normal.   Cardiovascular:      Rate and Rhythm: Normal rate and regular rhythm.  No extrasystoles are present.     Pulses: Normal pulses.      Heart sounds: Normal heart sounds. No friction rub.      Comments: No palpable chest discomfort, no change hunching forward or lying flat  Pulmonary:      Effort: Pulmonary effort is normal. No accessory muscle usage or respiratory distress.      Breath sounds: Normal breath sounds and air entry. No decreased breath sounds, wheezing, rhonchi or rales.   Musculoskeletal:         General: Normal range of motion.      Cervical back: Neck supple.      Right lower leg: No edema.      Left lower leg: No edema.   Lymphadenopathy:      Cervical: " Cervical adenopathy ( mild bilat) present.   Skin:     General: Skin is warm and dry.      Coloration: Skin is not pale.      Findings: No erythema.   Neurological:      Mental Status: She is alert and oriented to person, place, and time. She is not disoriented.   Psychiatric:         Speech: Speech normal.         Behavior: Behavior normal.        COVID pending    Assessment/Plan:   1. Cough  - COVID/SARS CoV-2 PCR; Future  - promethazine-dextromethorphan (PROMETHAZINE-DM) 6.25-15 MG/5ML syrup; Take 5 mL by mouth every four hours as needed for Cough.  Dispense: 120 mL; Refill: 0  - benzonatate (TESSALON) 100 MG Cap; Take 1 capsule by mouth 3 times a day as needed for Cough.  Dispense: 30 capsule; Refill: 0    2. Bronchospasm  - albuterol 108 (90 Base) MCG/ACT Aero Soln inhalation aerosol; Inhale 2 Puffs every 6 hours as needed for Shortness of Breath.  Dispense: 8.5 g; Refill: 2    3. History of allergic rhinitis  - fluticasone (FLONASE) 50 MCG/ACT nasal spray; Administer 2 Sprays into affected nostril(S) every day.  Dispense: 16 g; Refill: 0    4. Mild intermittent asthma with exacerbation  - albuterol 108 (90 Base) MCG/ACT Aero Soln inhalation aerosol; Inhale 2 Puffs every 6 hours as needed for Shortness of Breath.  Dispense: 8.5 g; Refill: 2    Supportive care is reviewed with patient/caregiver - recommend to push PO fluids and electrolytes, over-the-counter symptom support medications reviewed, ER precautions with worsened symptoms, quarantine recommendations reviewed, sent with letter, Maye for results of testing    Recommend treatment of allergies with fullness to TMs bilaterally and bad PND, history of asthma worsened with allergies under poor control recently with no use of MDI  Return to clinic with lack of resolution or progression of symptoms.  ER precautions with any worsening symptoms are reviewed with patient/caregiver and they do express understanding    I have worn an N95 mask, gloves and eye  protection for the entire encounter with this patient.     Differential diagnosis, natural history, supportive care, and indications for immediate follow-up discussed.

## 2021-04-23 LAB
SARS-COV-2 RNA RESP QL NAA+PROBE: NOTDETECTED
SPECIMEN SOURCE: NORMAL

## 2021-05-25 ENCOUNTER — NON-PROVIDER VISIT (OUTPATIENT)
Dept: MEDICAL GROUP | Facility: PHYSICIAN GROUP | Age: 21
End: 2021-05-25
Payer: COMMERCIAL

## 2021-05-25 DIAGNOSIS — Z30.42 ENCOUNTER FOR SURVEILLANCE OF INJECTABLE CONTRACEPTIVE: ICD-10-CM

## 2021-05-25 PROCEDURE — 96372 THER/PROPH/DIAG INJ SC/IM: CPT | Performed by: FAMILY MEDICINE

## 2021-05-25 RX ORDER — MEDROXYPROGESTERONE ACETATE 150 MG/ML
150 INJECTION, SUSPENSION INTRAMUSCULAR ONCE
Status: COMPLETED | OUTPATIENT
Start: 2021-05-25 | End: 2021-05-25

## 2021-05-25 RX ADMIN — MEDROXYPROGESTERONE ACETATE 150 MG: 150 INJECTION, SUSPENSION INTRAMUSCULAR at 11:53

## 2021-05-25 NOTE — NON-PROVIDER
PemaRobertAlexandra Hines is a 20 y.o. here for a Depo Provera Injection.     Date of last Depo Provera Injection: 02/22  Current date within therapeutic range?: No   Urine pregnancy test done (needed if out of date range): yes--neg   Date of office visit:02/2021  Date of last pap (if > 21 years old)/ GYN exam: na  Dx: Contraceptive use    Patient tolerated injection and no adverse effects were observed or reported: Yes    # of Administrations remaining in MAR: 0  Next injection due between 08/10/2021 and 08/24/2021.

## 2021-06-15 DIAGNOSIS — R10.9 ABDOMINAL PAIN, UNSPECIFIED ABDOMINAL LOCATION: ICD-10-CM

## 2021-06-18 ENCOUNTER — HOSPITAL ENCOUNTER (OUTPATIENT)
Dept: LAB | Facility: MEDICAL CENTER | Age: 21
End: 2021-06-18
Attending: FAMILY MEDICINE
Payer: COMMERCIAL

## 2021-06-18 ENCOUNTER — OFFICE VISIT (OUTPATIENT)
Dept: MEDICAL GROUP | Facility: PHYSICIAN GROUP | Age: 21
End: 2021-06-18
Payer: COMMERCIAL

## 2021-06-18 VITALS
HEIGHT: 67 IN | RESPIRATION RATE: 14 BRPM | OXYGEN SATURATION: 97 % | DIASTOLIC BLOOD PRESSURE: 86 MMHG | BODY MASS INDEX: 29.03 KG/M2 | WEIGHT: 185 LBS | TEMPERATURE: 97.9 F | HEART RATE: 88 BPM | SYSTOLIC BLOOD PRESSURE: 118 MMHG

## 2021-06-18 DIAGNOSIS — R19.7 WATERY DIARRHEA: ICD-10-CM

## 2021-06-18 DIAGNOSIS — Z23 NEED FOR VACCINATION: ICD-10-CM

## 2021-06-18 DIAGNOSIS — R35.0 INCREASED URINARY FREQUENCY: ICD-10-CM

## 2021-06-18 LAB
ALBUMIN SERPL BCP-MCNC: 4.1 G/DL (ref 3.2–4.9)
ALBUMIN/GLOB SERPL: 1.5 G/DL
ALP SERPL-CCNC: 79 U/L (ref 30–99)
ALT SERPL-CCNC: 14 U/L (ref 2–50)
ANION GAP SERPL CALC-SCNC: 9 MMOL/L (ref 7–16)
AST SERPL-CCNC: 12 U/L (ref 12–45)
BILIRUB SERPL-MCNC: 0.3 MG/DL (ref 0.1–1.5)
BUN SERPL-MCNC: 9 MG/DL (ref 8–22)
CALCIUM SERPL-MCNC: 9.2 MG/DL (ref 8.5–10.5)
CHLORIDE SERPL-SCNC: 107 MMOL/L (ref 96–112)
CO2 SERPL-SCNC: 24 MMOL/L (ref 20–33)
CREAT SERPL-MCNC: 0.78 MG/DL (ref 0.5–1.4)
GLOBULIN SER CALC-MCNC: 2.8 G/DL (ref 1.9–3.5)
GLUCOSE SERPL-MCNC: 99 MG/DL (ref 65–99)
IRON SATN MFR SERPL: 11 % (ref 15–55)
IRON SERPL-MCNC: 35 UG/DL (ref 40–170)
POTASSIUM SERPL-SCNC: 4.2 MMOL/L (ref 3.6–5.5)
PROT SERPL-MCNC: 6.9 G/DL (ref 6–8.2)
SODIUM SERPL-SCNC: 140 MMOL/L (ref 135–145)
TIBC SERPL-MCNC: 322 UG/DL (ref 250–450)
UIBC SERPL-MCNC: 287 UG/DL (ref 110–370)

## 2021-06-18 PROCEDURE — 81001 URINALYSIS AUTO W/SCOPE: CPT

## 2021-06-18 PROCEDURE — 82306 VITAMIN D 25 HYDROXY: CPT

## 2021-06-18 PROCEDURE — 36415 COLL VENOUS BLD VENIPUNCTURE: CPT

## 2021-06-18 PROCEDURE — 82607 VITAMIN B-12: CPT

## 2021-06-18 PROCEDURE — 99214 OFFICE O/P EST MOD 30 MIN: CPT | Performed by: FAMILY MEDICINE

## 2021-06-18 PROCEDURE — 85025 COMPLETE CBC W/AUTO DIFF WBC: CPT

## 2021-06-18 PROCEDURE — 82784 ASSAY IGA/IGD/IGG/IGM EACH: CPT

## 2021-06-18 PROCEDURE — 87086 URINE CULTURE/COLONY COUNT: CPT

## 2021-06-18 PROCEDURE — 84443 ASSAY THYROID STIM HORMONE: CPT

## 2021-06-18 PROCEDURE — 83550 IRON BINDING TEST: CPT

## 2021-06-18 PROCEDURE — 83516 IMMUNOASSAY NONANTIBODY: CPT

## 2021-06-18 PROCEDURE — 80053 COMPREHEN METABOLIC PANEL: CPT

## 2021-06-18 PROCEDURE — 83540 ASSAY OF IRON: CPT

## 2021-06-18 RX ORDER — LOPERAMIDE HYDROCHLORIDE 2 MG/1
2 CAPSULE ORAL 4 TIMES DAILY PRN
Qty: 30 CAPSULE | Refills: 2 | Status: SHIPPED
Start: 2021-06-18 | End: 2022-01-19

## 2021-06-18 RX ORDER — MEDROXYPROGESTERONE ACETATE 150 MG/ML
150 INJECTION, SUSPENSION INTRAMUSCULAR ONCE
COMMUNITY
End: 2021-08-30

## 2021-06-18 RX ORDER — DICYCLOMINE HYDROCHLORIDE 10 MG/1
10 CAPSULE ORAL
Qty: 120 CAPSULE | Refills: 1 | Status: SHIPPED
Start: 2021-06-18 | End: 2022-01-19

## 2021-06-18 ASSESSMENT — PATIENT HEALTH QUESTIONNAIRE - PHQ9: CLINICAL INTERPRETATION OF PHQ2 SCORE: 0

## 2021-06-18 NOTE — PROGRESS NOTES
Subjective:   Tamy Hines is a 20 y.o. female here today for evaluation and management of:     Watery diarrhea  Patient has a long history of diarrhea and abdominal cramping after meals. She had a colonoscopy and EGD in the past with pediatric GI, Dr. Atkinson had an exploratory laparoscopy with cholecystectomy at age 14. But over the years and more recently she has worsening symptoms immediately with eating has immediate watery non bloody diarrhea, which does not float.   She had an elevated fecal calprotectin and negative tissue transglutaminase on previous labs. Repeat labs ordered.     She has tried gluten free diet, sugar free diet, lactose free diet, low sodium diet in the past when she was 15, it helped a the time, but was cost prohibitive.     Ref to GI done for repeat evaluation and treatment.   She has lost 5 lbs in the last 6 months, this is due to her not eating more than 1 meal a day to reduce episodes of diarrhea.   She also feels tired all the time.     She has some recent abx use for UTI but GI symptoms have been ongoing for years. She has no camping or travel outside the country.     Will check cbc, cmp, tsh, stool cdiff, hpylori, crypto/giardia, celiac dz panel, b12, iron, vit D    She has also developed increased urinary frequency, odd smelling urine and even after treatment of UTI with antibiotics, symptoms persist unless she drinks cranberry juice. Repeat UA, culture ordered.     She tried bentyl in the past does not recall it helping much.   She was taking imodium daily in the past and would swing between constipation and diarrhea,   Metamucil did not help.   Advised to eat banana to help with binding stool.     Will try repeat course of bentyl and imodium.      Ref. Range 10/19/2016 15:00   Calprotectin, Fecal Latest Ref Range: <=50 ug/g 115 (H)      Ref. Range 9/6/2016 12:05   t-TG IgA Latest Ref Range: 0 - 3 U/mL 0      Ref. Range 2/17/2018 10:42   TSH Latest Ref Range: 0.380 -  "5.330 uIU/mL 1.170   Free T-4 Latest Ref Range: 0.53 - 1.43 ng/dL 0.93      Ref. Range 5/29/2015 14:45   Ebv Ab Vca, Igg Latest Ref Range: 0.0 - 21.9 U/mL <10.0   Ebv Ea-Igg Latest Ref Range: 0.0 - 10.9 U/mL <5.0   Ebv Na-Abs Latest Ref Range: 0.0 - 21.9 U/mL <3.0      Ref. Range 2/17/2018 10:42   Rheumatoid Factor -Neph- Latest Ref Range: 0 - 14 IU/mL <10   Antistreptolysin O Titer Latest Ref Range: 0 - 330 IU/mL 132   Stat C-Reactive Protein Latest Ref Range: 0.00 - 0.75 mg/dL 0.16          Current medicines (including changes today)  Current Outpatient Medications   Medication Sig Dispense Refill   • medroxyPROGESTERone (DEPO-PROVERA) 150 MG/ML Suspension Inject 150 mg into the shoulder, thigh, or buttocks one time.     • loperamide (IMODIUM) 2 MG Cap Take 1 capsule by mouth 4 times a day as needed for Diarrhea. 30 capsule 2   • dicyclomine (BENTYL) 10 MG Cap Take 1 capsule by mouth 4 Times a Day,Before Meals and at Bedtime. 120 capsule 1   • albuterol 108 (90 Base) MCG/ACT Aero Soln inhalation aerosol Inhale 2 Puffs every 6 hours as needed for Shortness of Breath. 8.5 g 2   • fluticasone (FLONASE) 50 MCG/ACT nasal spray Administer 2 Sprays into affected nostril(S) every day. 16 g 0   • cetirizine (ZYRTEC) 10 MG Tab Take 1 Tab by mouth every day. 90 Tab 3   • amitriptyline (ELAVIL) 50 MG Tab Take 1 Tab by mouth every bedtime. 90 Tab 3   • ibuprofen (MOTRIN) 200 MG Tab Take 400 mg by mouth every 6 hours as needed.       No current facility-administered medications for this visit.     She  has a past medical history of Allergic rhinitis, ASTHMA (8/15/2013), Bowel habit changes, Gynecological disorder, Heart burn, Indigestion, Menarche (8/15/2013), and Nausea, vomiting and diarrhea (11/19/2014).    ROS  No chest pain, no shortness of breath, no abdominal pain       Objective:     /86   Pulse 88   Temp 36.6 °C (97.9 °F) (Temporal)   Resp 14   Ht 1.702 m (5' 7\")   Wt 83.9 kg (185 lb)   SpO2 97%  Body mass " index is 28.98 kg/m².   Physical Exam:  Constitutional: Alert, no distress.  Skin: Warm, dry, good turgor, no rashes in visible areas.  Eye: Equal, round and reactive, conjunctiva clear, lids normal.  ENMT: Lips without lesions, good dentition, oropharynx clear.  Neck: Trachea midline, no masses, no thyromegaly. No cervical or supraclavicular lymphadenopathy  Respiratory: Unlabored respiratory effort, lungs clear to auscultation, no wheezes, no ronchi.  Cardiovascular: Normal S1, S2, no murmur, no edema.  Abdomen: Soft, non-tender, no masses, no hepatosplenomegaly.  Psych: Alert and oriented x3, normal affect and mood.        Assessment and Plan:   The following treatment plan was discussed    1. Watery diarrhea  rx for bentyl and imodium provided.     - REFERRAL TO GASTROENTEROLOGY  - CBC WITH DIFFERENTIAL; Future  - Comp Metabolic Panel; Future  - TSH WITH REFLEX TO FT4; Future  - VITAMIN D,25 HYDROXY; Future  - IRON/TOTAL IRON BIND; Future  - VITAMIN B12; Future  - CELIAC DISEASE AB PANEL; Future  - CRYPTO/GIARDIA RAPID ASSAY; Future  - H.PYLORI STOOL ANTIGEN; Future  - Cdiff By PCR Rflx Toxin; Future    2. Increased urinary frequency    - URINALYSIS; Future  - URINE CULTURE(NEW); Future      Followup: No follow-ups on file.

## 2021-06-18 NOTE — ASSESSMENT & PLAN NOTE
Patient has a long history of diarrhea and abdominal cramping after meals. She had a colonoscopy and EGD in the past with pediatric GI, Dr. Atkinson had an exploratory laparoscopy with cholecystectomy at age 14. But over the years and more recently she has worsening symptoms immediately with eating has immediate watery non bloody diarrhea, which does not float.   She had an elevated fecal calprotectin and negative tissue transglutaminase on previous labs. Repeat labs ordered.     She has tried gluten free diet, sugar free diet, lactose free diet, low sodium diet in the past when she was 15, it helped a the time, but was cost prohibitive.     Ref to GI done for repeat evaluation and treatment.   She has lost 5 lbs in the last 6 months, this is due to her not eating more than 1 meal a day to reduce episodes of diarrhea.   She also feels tired all the time.     She has some recent abx use for UTI but GI symptoms have been ongoing for years. She has no camping or travel outside the country.     Will check cbc, cmp, tsh, stool cdiff, hpylori, crypto/giardia, celiac dz panel, b12, iron, vit D    She has also developed increased urinary frequency, odd smelling urine and even after treatment of UTI with antibiotics, symptoms persist unless she drinks cranberry juice. Repeat UA, culture ordered.     She tried bentyl in the past does not recall it helping much.   She was taking imodium daily in the past and would swing between constipation and diarrhea,   Metamucil did not help.   Advised to eat banana to help with binding stool.     Will try repeat course of bentyl and imodium.      Ref. Range 10/19/2016 15:00   Calprotectin, Fecal Latest Ref Range: <=50 ug/g 115 (H)      Ref. Range 9/6/2016 12:05   t-TG IgA Latest Ref Range: 0 - 3 U/mL 0      Ref. Range 2/17/2018 10:42   TSH Latest Ref Range: 0.380 - 5.330 uIU/mL 1.170   Free T-4 Latest Ref Range: 0.53 - 1.43 ng/dL 0.93      Ref. Range 5/29/2015 14:45   Ebv Ab Vca, Igg  Latest Ref Range: 0.0 - 21.9 U/mL <10.0   Ebv Ea-Igg Latest Ref Range: 0.0 - 10.9 U/mL <5.0   Ebv Na-Abs Latest Ref Range: 0.0 - 21.9 U/mL <3.0      Ref. Range 2/17/2018 10:42   Rheumatoid Factor -Neph- Latest Ref Range: 0 - 14 IU/mL <10   Antistreptolysin O Titer Latest Ref Range: 0 - 330 IU/mL 132   Stat C-Reactive Protein Latest Ref Range: 0.00 - 0.75 mg/dL 0.16

## 2021-06-19 ENCOUNTER — HOSPITAL ENCOUNTER (OUTPATIENT)
Facility: MEDICAL CENTER | Age: 21
End: 2021-06-19
Attending: FAMILY MEDICINE
Payer: COMMERCIAL

## 2021-06-19 DIAGNOSIS — R19.7 WATERY DIARRHEA: ICD-10-CM

## 2021-06-19 LAB
25(OH)D3 SERPL-MCNC: 23 NG/ML (ref 30–100)
AMORPH CRY #/AREA URNS HPF: PRESENT /HPF
APPEARANCE UR: ABNORMAL
BACTERIA #/AREA URNS HPF: NEGATIVE /HPF
BASOPHILS # BLD AUTO: 0.3 % (ref 0–1.8)
BASOPHILS # BLD: 0.02 K/UL (ref 0–0.12)
BILIRUB UR QL STRIP.AUTO: NEGATIVE
C DIFF DNA SPEC QL NAA+PROBE: NEGATIVE
C DIFF TOX GENS STL QL NAA+PROBE: NEGATIVE
COLOR UR: YELLOW
EOSINOPHIL # BLD AUTO: 0.13 K/UL (ref 0–0.51)
EOSINOPHIL NFR BLD: 2 % (ref 0–6.9)
EPI CELLS #/AREA URNS HPF: ABNORMAL /HPF
ERYTHROCYTE [DISTWIDTH] IN BLOOD BY AUTOMATED COUNT: 43.7 FL (ref 35.9–50)
G LAMBLIA+C PARVUM AG STL QL RAPID: NORMAL
GLUCOSE UR STRIP.AUTO-MCNC: NEGATIVE MG/DL
H PYLORI AG STL QL IA: NOT DETECTED
HCT VFR BLD AUTO: 43.2 % (ref 37–47)
HGB BLD-MCNC: 13.8 G/DL (ref 12–16)
HYALINE CASTS #/AREA URNS LPF: ABNORMAL /LPF
IMM GRANULOCYTES # BLD AUTO: 0.01 K/UL (ref 0–0.11)
IMM GRANULOCYTES NFR BLD AUTO: 0.2 % (ref 0–0.9)
KETONES UR STRIP.AUTO-MCNC: NEGATIVE MG/DL
LEUKOCYTE ESTERASE UR QL STRIP.AUTO: ABNORMAL
LYMPHOCYTES # BLD AUTO: 1.3 K/UL (ref 1–4.8)
LYMPHOCYTES NFR BLD: 19.6 % (ref 22–41)
MCH RBC QN AUTO: 27.8 PG (ref 27–33)
MCHC RBC AUTO-ENTMCNC: 31.9 G/DL (ref 33.6–35)
MCV RBC AUTO: 86.9 FL (ref 81.4–97.8)
MICRO URNS: ABNORMAL
MONOCYTES # BLD AUTO: 0.47 K/UL (ref 0–0.85)
MONOCYTES NFR BLD AUTO: 7.1 % (ref 0–13.4)
MUCOUS THREADS #/AREA URNS HPF: ABNORMAL /HPF
NEUTROPHILS # BLD AUTO: 4.69 K/UL (ref 2–7.15)
NEUTROPHILS NFR BLD: 70.8 % (ref 44–72)
NITRITE UR QL STRIP.AUTO: NEGATIVE
NRBC # BLD AUTO: 0 K/UL
NRBC BLD-RTO: 0 /100 WBC
PH UR STRIP.AUTO: 5.5 [PH] (ref 5–8)
PLATELET # BLD AUTO: 251 K/UL (ref 164–446)
PMV BLD AUTO: 12.8 FL (ref 9–12.9)
PROT UR QL STRIP: NEGATIVE MG/DL
RBC # BLD AUTO: 4.97 M/UL (ref 4.2–5.4)
RBC # URNS HPF: ABNORMAL /HPF
RBC UR QL AUTO: NEGATIVE
SIGNIFICANT IND 70042: NORMAL
SITE SITE: NORMAL
SOURCE SOURCE: NORMAL
SP GR UR STRIP.AUTO: 1.02
TSH SERPL DL<=0.005 MIU/L-ACNC: 1.41 UIU/ML (ref 0.38–5.33)
UROBILINOGEN UR STRIP.AUTO-MCNC: 0.2 MG/DL
VIT B12 SERPL-MCNC: 276 PG/ML (ref 211–911)
WBC # BLD AUTO: 6.6 K/UL (ref 4.8–10.8)
WBC #/AREA URNS HPF: ABNORMAL /HPF

## 2021-06-19 PROCEDURE — 87493 C DIFF AMPLIFIED PROBE: CPT

## 2021-06-19 PROCEDURE — 87329 GIARDIA AG IA: CPT

## 2021-06-19 PROCEDURE — 87338 HPYLORI STOOL AG IA: CPT

## 2021-06-19 PROCEDURE — 87328 CRYPTOSPORIDIUM AG IA: CPT

## 2021-06-21 LAB
BACTERIA UR CULT: NORMAL
IGA SERPL-MCNC: 162 MG/DL (ref 68–408)
SIGNIFICANT IND 70042: NORMAL
SITE SITE: NORMAL
SOURCE SOURCE: NORMAL
TTG IGA SER IA-ACNC: <2 U/ML (ref 0–3)

## 2021-06-29 ENCOUNTER — TELEPHONE (OUTPATIENT)
Dept: MEDICAL GROUP | Facility: PHYSICIAN GROUP | Age: 21
End: 2021-06-29

## 2021-07-07 DIAGNOSIS — E55.9 VITAMIN D DEFICIENCY: ICD-10-CM

## 2021-07-07 DIAGNOSIS — E61.1 IRON DEFICIENCY: ICD-10-CM

## 2021-07-07 RX ORDER — LANOLIN ALCOHOL/MO/W.PET/CERES
325 CREAM (GRAM) TOPICAL
Qty: 90 TABLET | Refills: 3 | Status: SHIPPED
Start: 2021-07-07 | End: 2022-01-19

## 2021-07-07 RX ORDER — ERGOCALCIFEROL 1.25 MG/1
50000 CAPSULE ORAL
Qty: 7 CAPSULE | Refills: 0 | Status: SHIPPED
Start: 2021-07-07 | End: 2021-08-19

## 2021-07-07 NOTE — RESULT ENCOUNTER NOTE
Ari Mcadams,   Your labs show slightly low iron and slightly low vitamin D,   I've sent the order for supplements to [facility] for both.   Iron can cause constipation and black stool sometimes.     All other labs and urine tests with no concerns!  Anabela Chong M.D.

## 2021-08-30 ENCOUNTER — NON-PROVIDER VISIT (OUTPATIENT)
Dept: MEDICAL GROUP | Facility: PHYSICIAN GROUP | Age: 21
End: 2021-08-30
Payer: COMMERCIAL

## 2021-08-30 DIAGNOSIS — Z30.42 ENCOUNTER FOR SURVEILLANCE OF INJECTABLE CONTRACEPTIVE: ICD-10-CM

## 2021-08-30 LAB
INT CON NEG: NORMAL
INT CON POS: NORMAL
POC URINE PREGNANCY TEST: NORMAL

## 2021-08-30 PROCEDURE — 81025 URINE PREGNANCY TEST: CPT | Performed by: NURSE PRACTITIONER

## 2021-08-30 PROCEDURE — 96372 THER/PROPH/DIAG INJ SC/IM: CPT | Performed by: NURSE PRACTITIONER

## 2021-08-30 RX ORDER — MEDROXYPROGESTERONE ACETATE 150 MG/ML
150 INJECTION, SUSPENSION INTRAMUSCULAR ONCE
Status: COMPLETED | OUTPATIENT
Start: 2021-08-30 | End: 2021-08-30

## 2021-08-30 RX ADMIN — MEDROXYPROGESTERONE ACETATE 150 MG: 150 INJECTION, SUSPENSION INTRAMUSCULAR at 16:34

## 2021-08-30 NOTE — NON-PROVIDER
PemaRobertAlexandra Hines is a 20 y.o. here for a Depo Provera Injection.     Date of last Depo Provera Injection: 05/13/2021  Current date within therapeutic range?: No   Urine pregnancy test done (needed if out of date range): yes--NEG  Date of last office visit: 06/18/2021   Date of last pap (if > 21 years old)/ GYN exam: NA  Dx: Contraceptive use    Patient tolerated injection and no adverse effects were observed or reported: Yes    # of Administrations remaining in MAR: 0  Next injection due between 11/15 and 11/30.

## 2021-10-04 ENCOUNTER — HOSPITAL ENCOUNTER (OUTPATIENT)
Dept: LAB | Facility: MEDICAL CENTER | Age: 21
End: 2021-10-04
Attending: OBSTETRICS & GYNECOLOGY
Payer: COMMERCIAL

## 2021-10-04 PROCEDURE — 87624 HPV HI-RISK TYP POOLED RSLT: CPT

## 2021-10-04 PROCEDURE — 88175 CYTOPATH C/V AUTO FLUID REDO: CPT

## 2021-10-04 PROCEDURE — 87591 N.GONORRHOEAE DNA AMP PROB: CPT

## 2021-10-04 PROCEDURE — 87491 CHLMYD TRACH DNA AMP PROBE: CPT

## 2021-10-05 LAB
C TRACH DNA GENITAL QL NAA+PROBE: NEGATIVE
CYTOLOGY REG CYTOL: NORMAL
HPV HR 12 DNA CVX QL NAA+PROBE: NEGATIVE
HPV16 DNA SPEC QL NAA+PROBE: NEGATIVE
HPV18 DNA SPEC QL NAA+PROBE: NEGATIVE
N GONORRHOEA DNA GENITAL QL NAA+PROBE: NEGATIVE
SPECIMEN SOURCE: NORMAL
SPECIMEN SOURCE: NORMAL

## 2021-11-08 ENCOUNTER — NON-PROVIDER VISIT (OUTPATIENT)
Dept: MEDICAL GROUP | Facility: PHYSICIAN GROUP | Age: 21
End: 2021-11-08
Payer: COMMERCIAL

## 2021-12-08 ENCOUNTER — NON-PROVIDER VISIT (OUTPATIENT)
Dept: MEDICAL GROUP | Facility: PHYSICIAN GROUP | Age: 21
End: 2021-12-08
Payer: COMMERCIAL

## 2021-12-08 DIAGNOSIS — Z31.69 PRE-CONCEPTION COUNSELING: ICD-10-CM

## 2021-12-08 LAB
INT CON NEG: NORMAL
INT CON POS: NORMAL
POC URINE PREGNANCY TEST: NEGATIVE

## 2021-12-08 PROCEDURE — 96372 THER/PROPH/DIAG INJ SC/IM: CPT | Performed by: FAMILY MEDICINE

## 2021-12-08 PROCEDURE — 81025 URINE PREGNANCY TEST: CPT | Performed by: FAMILY MEDICINE

## 2021-12-08 RX ORDER — MEDROXYPROGESTERONE ACETATE 150 MG/ML
150 INJECTION, SUSPENSION INTRAMUSCULAR ONCE
Status: COMPLETED | OUTPATIENT
Start: 2021-12-08 | End: 2021-12-08

## 2021-12-08 RX ADMIN — MEDROXYPROGESTERONE ACETATE 150 MG: 150 INJECTION, SUSPENSION INTRAMUSCULAR at 18:02

## 2021-12-08 NOTE — NON-PROVIDER
PemaRobertAlexandra Hines is a 20 y.o. here for a Depo Provera Injection.     Date of last Depo Provera Injection: 8/30/2021  Current date within therapeutic range?: No   Urine pregnancy test done (needed if out of date range): yes-- neg  Date of last office visit: 06/18/2021  Date of last pap (if > 21 years old)/ GYN exam: n/a  Dx: Contraceptive use    Patient tolerated injection and no adverse effects were observed or reported: yes    # of Administrations remaining in MAR: 0  Next injection due between 02-23 and 03-

## 2021-12-17 ENCOUNTER — APPOINTMENT (OUTPATIENT)
Dept: RADIOLOGY | Facility: IMAGING CENTER | Age: 21
End: 2021-12-17
Attending: FAMILY MEDICINE
Payer: COMMERCIAL

## 2021-12-17 ENCOUNTER — OFFICE VISIT (OUTPATIENT)
Dept: URGENT CARE | Facility: PHYSICIAN GROUP | Age: 21
End: 2021-12-17
Payer: COMMERCIAL

## 2021-12-17 VITALS
HEIGHT: 67 IN | WEIGHT: 210 LBS | RESPIRATION RATE: 16 BRPM | DIASTOLIC BLOOD PRESSURE: 80 MMHG | BODY MASS INDEX: 32.96 KG/M2 | TEMPERATURE: 98.5 F | SYSTOLIC BLOOD PRESSURE: 116 MMHG | OXYGEN SATURATION: 97 % | HEART RATE: 81 BPM

## 2021-12-17 DIAGNOSIS — S82.831A OTHER CLOSED FRACTURE OF DISTAL END OF RIGHT FIBULA, INITIAL ENCOUNTER: ICD-10-CM

## 2021-12-17 DIAGNOSIS — S93.401A SPRAIN OF RIGHT ANKLE, UNSPECIFIED LIGAMENT, INITIAL ENCOUNTER: ICD-10-CM

## 2021-12-17 PROCEDURE — 73610 X-RAY EXAM OF ANKLE: CPT | Mod: TC,FY,RT | Performed by: FAMILY MEDICINE

## 2021-12-17 PROCEDURE — 99215 OFFICE O/P EST HI 40 MIN: CPT | Performed by: FAMILY MEDICINE

## 2021-12-17 PROCEDURE — 73630 X-RAY EXAM OF FOOT: CPT | Mod: TC,FY,RT | Performed by: FAMILY MEDICINE

## 2021-12-17 RX ORDER — MEDROXYPROGESTERONE ACETATE 150 MG/ML
150 INJECTION, SUSPENSION INTRAMUSCULAR
COMMUNITY
End: 2022-03-09

## 2021-12-17 RX ORDER — OXYCODONE HYDROCHLORIDE AND ACETAMINOPHEN 5; 325 MG/1; MG/1
1 TABLET ORAL EVERY 4 HOURS PRN
Qty: 10 TABLET | Refills: 0 | Status: SHIPPED | OUTPATIENT
Start: 2021-12-17 | End: 2021-12-24

## 2021-12-17 RX ORDER — ACETAMINOPHEN 500 MG
1000 TABLET ORAL ONCE
Status: COMPLETED | OUTPATIENT
Start: 2021-12-17 | End: 2021-12-17

## 2021-12-17 RX ORDER — OXYCODONE HYDROCHLORIDE AND ACETAMINOPHEN 5; 325 MG/1; MG/1
1 TABLET ORAL EVERY 8 HOURS PRN
Qty: 10 TABLET | Refills: 0 | Status: SHIPPED | OUTPATIENT
Start: 2021-12-17 | End: 2021-12-24

## 2021-12-17 RX ADMIN — Medication 1000 MG: at 13:46

## 2021-12-17 ASSESSMENT — FIBROSIS 4 INDEX: FIB4 SCORE: 0.27

## 2021-12-17 ASSESSMENT — PAIN SCALES - GENERAL: PAINLEVEL: 7=MODERATE-SEVERE PAIN

## 2021-12-17 NOTE — PROGRESS NOTES
"Subjective:      Chief Complaint   Patient presents with   • Ankle Injury     Tripped today and hurt R ankle              Ankle Injury   The incident occurred 3 hours ago. The incident occurred  while walking. The injury mechanism was an inversion injury. The pain is present in the right ankle. The pain is moderate. The pain has been constant since onset.   Unable to put wt on it.       Pertinent negatives include no inability to muscle weakness, numbness or tingling. The symptoms are aggravated by weight bearing and palpation. pt has tried acetaminophen for the symptoms. The treatment provided mild relief.       Social History     Tobacco Use   • Smoking status: Never Smoker   • Smokeless tobacco: Never Used   Vaping Use   • Vaping Use: Never used   Substance Use Topics   • Alcohol use: Yes     Alcohol/week: 0.0 oz     Comment: Social   • Drug use: No         Past Medical History:   Diagnosis Date   • Nausea, vomiting and diarrhea 11/19/2014   • Menarche 8/15/2013    Started at 10. Fairly regular.   • ASTHMA 8/15/2013    inhaler as needed   • Allergic rhinitis    • Bowel habit changes     diarrhea   • Gynecological disorder     severe cramps and cysts   • Heart burn     knees, back, hands, stomach 4-10/10   • Indigestion          Review of Systems   Constitutional: Negative for fever.   Respiratory: Negative for shortness of breath.    Cardiovascular: Negative for chest pain.   Neurological: Negative for tingling and numbness.   All other systems reviewed and are negative.         Objective:     /80   Pulse 81   Temp 36.9 °C (98.5 °F) (Temporal)   Resp 16   Ht 1.702 m (5' 7\")   Wt 95.3 kg (210 lb)   SpO2 97%     Physical Exam   Constitutional: pt is oriented to person, place, and time. Pt appears well-developed. No distress.   HENT:   Head: Normocephalic and atraumatic.   Eyes: Conjunctivae are normal.   Cardiovascular: Normal rate and regular rhythm.    Pulmonary/Chest: Effort normal and breath sounds " normal.   Musculoskeletal:        Rt ankle: pt exhibits minor swelling over distal fibula, just proximal to lateral malleolus.   There is exquisite tenderness to palpation over distal fibula.        Flexion/extension, inversion, eversion intact, but limited, likely due to pain.     .  Achilles tendon normal.        Rt foot: DP pulse 2+.   No swelling.    Sensation intact.   Neurological: pt is alert and oriented to person, place, and time.     Skin: Skin is warm. Pt is not diaphoretic. No erythema.   Psychiatric: His behavior is normal.   Nursing note and vitals reviewed.       Details    Reading Physician Reading Date Result Priority   Gretta Doan M.D.  [phone] 12/17/2021 Urgent Care     Narrative & Impression     12/17/2021 1:44 PM     HISTORY/REASON FOR EXAM:  Fall earlier today with right ankle pain        TECHNIQUE/EXAM DESCRIPTION AND NUMBER OF VIEWS:  3 views of the RIGHT ankle.     COMPARISON: None     FINDINGS:     There is no focal soft tissue swelling.     There is a transverse lucency at the distal right fibula.     The alignment of the ankle is within normal limits.        IMPRESSION:     1.  There is a nondisplaced transverse fracture of the distal right fibula.             Exam Ended: 12/17/21  1:56 PM Last Resulted: 12/17/21  2:00 PM                  Assessment/Plan:     1.   Other closed fracture of distal end of right fibula, initial encounter     X-rays were personally reviewed by myself.   There is a nondisplaced, distal, transverse fibular fracture     Leg was placed in air cast splint    She is non weight bearing on right for now - given crutches.       Pt was advised on signs and symptoms of compartment syndrome and DVT and will return to clinic or ED immediately if she notes any concerning symptoms.          URGENT referral to Sports Medicine - Dr. Fuller       - oxyCODONE-acetaminophen (PERCOCET) 5-325 MG Tab; Take 1 Tablet by mouth every four hours as needed for up to 7 days.   Dispense: 10 Tablet; Refill: 0          Narcotic use report obtained with no indication of narcotic overuse or misuse and deemed necessary for treaatment. Sedation, dependence, and constipation precautions given. Avoid use while driving or operating heavy machinery.         My total time spent caring for the patient on the day of the encounter was at least 40 minutes.   This does not include time spent on separately billable procedures/tests.

## 2021-12-17 NOTE — LETTER
December 17, 2021       Patient: Tamy Hines   YOB: 2000   Date of Visit: 12/17/2021         To Whom It May Concern:    In my medical opinion, I recommend that Tamy Hines return to light duty with the following restrictions :    No weight bearing on right leg due to distal fibula fracture.     If you have any questions or concerns, please don't hesitate to call [phone]          Sincerely,          Rod Siu M.D.  Electronically Signed

## 2021-12-18 DIAGNOSIS — S82.839A OTHER CLOSED FRACTURE OF DISTAL END OF FIBULA, UNSPECIFIED LATERALITY, INITIAL ENCOUNTER: ICD-10-CM

## 2021-12-18 RX ORDER — OXYCODONE HYDROCHLORIDE AND ACETAMINOPHEN 5; 325 MG/1; MG/1
1 TABLET ORAL EVERY 8 HOURS PRN
Qty: 10 TABLET | Refills: 0 | Status: SHIPPED | OUTPATIENT
Start: 2021-12-18 | End: 2021-12-25

## 2021-12-18 RX ORDER — OXYCODONE HYDROCHLORIDE AND ACETAMINOPHEN 5; 325 MG/1; MG/1
1 TABLET ORAL EVERY 4 HOURS PRN
Qty: 10 TABLET | Refills: 0 | Status: SHIPPED | OUTPATIENT
Start: 2021-12-18 | End: 2021-12-25

## 2022-01-19 ENCOUNTER — HOSPITAL ENCOUNTER (OUTPATIENT)
Facility: MEDICAL CENTER | Age: 22
End: 2022-01-19
Attending: PHYSICIAN ASSISTANT
Payer: COMMERCIAL

## 2022-01-19 ENCOUNTER — OFFICE VISIT (OUTPATIENT)
Dept: URGENT CARE | Facility: PHYSICIAN GROUP | Age: 22
End: 2022-01-19
Payer: COMMERCIAL

## 2022-01-19 VITALS
WEIGHT: 210 LBS | SYSTOLIC BLOOD PRESSURE: 118 MMHG | HEIGHT: 67 IN | TEMPERATURE: 99.7 F | HEART RATE: 89 BPM | DIASTOLIC BLOOD PRESSURE: 72 MMHG | OXYGEN SATURATION: 99 % | RESPIRATION RATE: 17 BRPM | BODY MASS INDEX: 32.96 KG/M2

## 2022-01-19 DIAGNOSIS — Z20.822 EXPOSURE TO CONFIRMED CASE OF COVID-19: ICD-10-CM

## 2022-01-19 DIAGNOSIS — J02.9 SORE THROAT: ICD-10-CM

## 2022-01-19 DIAGNOSIS — R05.9 COUGH: ICD-10-CM

## 2022-01-19 DIAGNOSIS — R50.9 FEVER AND CHILLS: ICD-10-CM

## 2022-01-19 LAB
INT CON NEG: NORMAL
INT CON POS: NORMAL
S PYO AG THROAT QL: NEGATIVE

## 2022-01-19 PROCEDURE — 99213 OFFICE O/P EST LOW 20 MIN: CPT | Mod: CS | Performed by: PHYSICIAN ASSISTANT

## 2022-01-19 PROCEDURE — U0003 INFECTIOUS AGENT DETECTION BY NUCLEIC ACID (DNA OR RNA); SEVERE ACUTE RESPIRATORY SYNDROME CORONAVIRUS 2 (SARS-COV-2) (CORONAVIRUS DISEASE [COVID-19]), AMPLIFIED PROBE TECHNIQUE, MAKING USE OF HIGH THROUGHPUT TECHNOLOGIES AS DESCRIBED BY CMS-2020-01-R: HCPCS

## 2022-01-19 PROCEDURE — 87880 STREP A ASSAY W/OPTIC: CPT | Performed by: PHYSICIAN ASSISTANT

## 2022-01-19 PROCEDURE — U0005 INFEC AGEN DETEC AMPLI PROBE: HCPCS

## 2022-01-19 ASSESSMENT — FIBROSIS 4 INDEX: FIB4 SCORE: 0.28

## 2022-01-19 ASSESSMENT — ENCOUNTER SYMPTOMS: HEADACHES: 1

## 2022-01-20 DIAGNOSIS — Z20.822 EXPOSURE TO CONFIRMED CASE OF COVID-19: ICD-10-CM

## 2022-01-20 DIAGNOSIS — R05.9 COUGH: ICD-10-CM

## 2022-01-20 DIAGNOSIS — R50.9 FEVER AND CHILLS: ICD-10-CM

## 2022-01-20 DIAGNOSIS — J02.9 SORE THROAT: ICD-10-CM

## 2022-01-20 LAB — COVID ORDER STATUS COVID19: NORMAL

## 2022-01-20 NOTE — PROGRESS NOTES
Subjective     Tamy Hines is a 21 y.o. female who presents with Headache (headache , body aches and chills x 24 hours )    Medications:    • medroxyPROGESTERone Susp    Allergies: Patient has no known allergies.    Problem List: Tamy Hines does not have any pertinent problems on file.    Surgical History:  Past Surgical History:   Procedure Laterality Date   • SOHAM BY LAPAROSCOPY N/A 12/17/2015    Procedure: SOHAM BY LAPAROSCOPY ;  Surgeon: Yuliet Dale M.D.;  Location: SURGERY [facility];  Service:    • COLONOSCOPY  10/2015   • ENDOSCOPY  08/2015       Past Social Hx: Tamy Hines  reports that she has never smoked. She has never used smokeless tobacco. She reports current alcohol use. She reports that she does not use drugs.     Past Family Hx:  Tamy Hines family history includes Other in her father and mother. She was adopted.     Problem list, medications, and allergies reviewed by myself today in Epic.          Patient presents with:  Headache: body aches, sore throat, fever and chills x 24 hours . Pt has had close exposure to person confirmed +covid .      Pt has been vaccinated against COVID-19     URI   This is a new problem. The current episode started yesterday. The problem has been gradually worsening. The maximum temperature recorded prior to her arrival was 100.4 - 100.9 F. The fever has been present for less than 1 day. Associated symptoms include coughing, headaches and a sore throat. She has tried acetaminophen, increased fluids and NSAIDs for the symptoms. The treatment provided no relief.       Review of Systems   Constitutional: Positive for chills and fever.   HENT: Positive for sore throat.    Respiratory: Positive for cough.    Neurological: Positive for headaches.   All other systems reviewed and are negative.             Objective     /72 (BP Location: Left arm, Patient Position: Sitting, BP Cuff Size: Adult)   Pulse 89   Temp 37.6 °C  "(99.7 °F) (Temporal)   Resp 17   Ht 1.702 m (5' 7\")   Wt 95.3 kg (210 lb)   SpO2 99%   BMI 32.89 kg/m²      Physical Exam  Vitals and nursing note reviewed.   Constitutional:       General: She is not in acute distress.     Appearance: Normal appearance. She is well-developed and normal weight. She is not ill-appearing or toxic-appearing.   HENT:      Head: Normocephalic and atraumatic.      Right Ear: Tympanic membrane normal.      Left Ear: Tympanic membrane normal.      Nose: Nose normal.      Mouth/Throat:      Lips: Pink.      Mouth: Mucous membranes are moist.      Pharynx: Oropharynx is clear. Uvula midline. No posterior oropharyngeal erythema.   Eyes:      Extraocular Movements: Extraocular movements intact.      Conjunctiva/sclera: Conjunctivae normal.      Pupils: Pupils are equal, round, and reactive to light.   Cardiovascular:      Rate and Rhythm: Normal rate and regular rhythm.      Pulses: Normal pulses.      Heart sounds: Normal heart sounds.   Pulmonary:      Effort: Pulmonary effort is normal.      Breath sounds: Normal breath sounds. No stridor. No wheezing, rhonchi or rales.   Abdominal:      General: Bowel sounds are normal.      Palpations: Abdomen is soft.   Musculoskeletal:         General: Normal range of motion.      Cervical back: Normal range of motion and neck supple.   Skin:     General: Skin is warm and dry.      Capillary Refill: Capillary refill takes less than 2 seconds.   Neurological:      General: No focal deficit present.      Mental Status: She is alert and oriented to person, place, and time.      Cranial Nerves: No cranial nerve deficit.      Motor: Motor function is intact.      Coordination: Coordination is intact.      Gait: Gait normal.   Psychiatric:         Mood and Affect: Mood normal.               Strep: negative             Assessment & Plan        1. Cough  COVID/SARS CoV-2 PCR   2. Sore throat  COVID/SARS CoV-2 PCR    POCT Rapid Strep A   3. Fever and chills  " COVID/SARS CoV-2 PCR   4. Exposure to confirmed case of COVID-19  COVID/SARS CoV-2 PCR     Per protocol for PUI/ISO patients, the patient was evaluated by me while I was wearing PPE.  Per CDC guidelines, patient has been instructed to self quarantine at home until test results are known.  IF positive, pt to remain at home for 10 days from onset of symptoms.  If negative, pt to remain at home until fever has resolved.       PT can begin or continue OTC medications, increase fluids and rest until symptoms improve.     PT should follow up with PCP in 1-2 days for re-evaluation if symptoms have not improved.      Discussed red flags and reasons to return to UC or ED.      Pt and/or family verbalized understanding of diagnosis and follow up instructions and was offered informational handout on diagnosis.  PT discharged.

## 2022-01-21 LAB
SARS-COV-2 RNA RESP QL NAA+PROBE: NOTDETECTED
SPECIMEN SOURCE: NORMAL

## 2022-01-22 ENCOUNTER — HOSPITAL ENCOUNTER (OUTPATIENT)
Facility: MEDICAL CENTER | Age: 22
End: 2022-01-22
Attending: FAMILY MEDICINE
Payer: COMMERCIAL

## 2022-01-22 ENCOUNTER — OFFICE VISIT (OUTPATIENT)
Dept: URGENT CARE | Facility: PHYSICIAN GROUP | Age: 22
End: 2022-01-22
Payer: COMMERCIAL

## 2022-01-22 ENCOUNTER — APPOINTMENT (OUTPATIENT)
Dept: RADIOLOGY | Facility: IMAGING CENTER | Age: 22
End: 2022-01-22
Attending: FAMILY MEDICINE
Payer: COMMERCIAL

## 2022-01-22 VITALS
OXYGEN SATURATION: 94 % | HEIGHT: 67 IN | TEMPERATURE: 97.9 F | HEART RATE: 67 BPM | RESPIRATION RATE: 16 BRPM | WEIGHT: 208 LBS | BODY MASS INDEX: 32.65 KG/M2 | SYSTOLIC BLOOD PRESSURE: 140 MMHG | DIASTOLIC BLOOD PRESSURE: 67 MMHG

## 2022-01-22 DIAGNOSIS — R05.9 COUGH: ICD-10-CM

## 2022-01-22 DIAGNOSIS — R06.02 SHORTNESS OF BREATH: ICD-10-CM

## 2022-01-22 DIAGNOSIS — J45.901 EXACERBATION OF ASTHMA, UNSPECIFIED ASTHMA SEVERITY, UNSPECIFIED WHETHER PERSISTENT: ICD-10-CM

## 2022-01-22 DIAGNOSIS — Z11.52 ENCOUNTER FOR SCREENING FOR COVID-19: ICD-10-CM

## 2022-01-22 DIAGNOSIS — J02.9 SORE THROAT: ICD-10-CM

## 2022-01-22 PROCEDURE — U0005 INFEC AGEN DETEC AMPLI PROBE: HCPCS

## 2022-01-22 PROCEDURE — 71046 X-RAY EXAM CHEST 2 VIEWS: CPT | Mod: TC,FY | Performed by: FAMILY MEDICINE

## 2022-01-22 PROCEDURE — 99214 OFFICE O/P EST MOD 30 MIN: CPT | Performed by: FAMILY MEDICINE

## 2022-01-22 PROCEDURE — U0003 INFECTIOUS AGENT DETECTION BY NUCLEIC ACID (DNA OR RNA); SEVERE ACUTE RESPIRATORY SYNDROME CORONAVIRUS 2 (SARS-COV-2) (CORONAVIRUS DISEASE [COVID-19]), AMPLIFIED PROBE TECHNIQUE, MAKING USE OF HIGH THROUGHPUT TECHNOLOGIES AS DESCRIBED BY CMS-2020-01-R: HCPCS

## 2022-01-22 RX ORDER — MIRABEGRON 50 MG/1
1 TABLET, FILM COATED, EXTENDED RELEASE ORAL
COMMUNITY
Start: 2022-01-07 | End: 2022-10-11

## 2022-01-22 RX ORDER — DEXAMETHASONE 6 MG/1
6 TABLET ORAL DAILY
Qty: 7 TABLET | Refills: 0 | Status: SHIPPED | OUTPATIENT
Start: 2022-01-22 | End: 2022-01-29

## 2022-01-22 RX ORDER — ALBUTEROL SULFATE 90 UG/1
AEROSOL, METERED RESPIRATORY (INHALATION)
Qty: 8.5 G | Refills: 2 | Status: ON HOLD | OUTPATIENT
Start: 2022-01-22 | End: 2022-03-15

## 2022-01-22 ASSESSMENT — FIBROSIS 4 INDEX: FIB4 SCORE: 0.28

## 2022-01-22 NOTE — PROGRESS NOTES
Chief Complaint:    Chief Complaint   Patient presents with   • Cough     fv from 2022. cough getting worse pt states.  pt states hx of asthma.        History of Present Illness:    Seen on 22 by other provider, had negative Covid test, visit reviewed. Has worsening sore throat, cough, and shortness of breath. Some headache. She did home Covid test last night - faintly positive. Has Asthma and has Albuterol MDI to use, but is , requests new Rx.      Past Medical History:    Past Medical History:   Diagnosis Date   • Allergic rhinitis    • ASTHMA 8/15/2013    inhaler as needed   • Bowel habit changes     diarrhea   • Gynecological disorder     severe cramps and cysts   • Heart burn     knees, back, hands, stomach -10/10   • Indigestion    • Menarche 8/15/2013    Started at 10. Fairly regular.   • Nausea, vomiting and diarrhea 2014     Past Surgical History:    Past Surgical History:   Procedure Laterality Date   • SOHAM BY LAPAROSCOPY N/A 2015    Procedure: SOHAM BY LAPAROSCOPY ;  Surgeon: Yuliet Dale M.D.;  Location: SURGERY [facility];  Service:    • COLONOSCOPY  10/2015   • ENDOSCOPY  2015     Social History:    Social History     Socioeconomic History   • Marital status: Single     Spouse name: Not on file   • Number of children: Not on file   • Years of education: Not on file   • Highest education level: Not on file   Occupational History   • Not on file   Tobacco Use   • Smoking status: Never Smoker   • Smokeless tobacco: Never Used   Vaping Use   • Vaping Use: Never used   Substance and Sexual Activity   • Alcohol use: Yes     Alcohol/week: 0.0 oz     Comment: Social   • Drug use: No   • Sexual activity: Yes   Other Topics Concern   • Behavioral problems Not Asked   • Interpersonal relationships Not Asked   • Sad or not enjoying activities Not Asked   • Suicidal thoughts Not Asked   • Poor school performance Not Asked   • Reading difficulties Not Asked   • Speech  difficulties Not Asked   • Writing difficulties Not Asked   • Inadequate sleep Not Asked   • Excessive TV viewing Not Asked   • Excessive video game use Not Asked   • Inadequate exercise Not Asked   • Sports related Not Asked   • Poor diet Not Asked   • Family concerns for drug/alcohol abuse Not Asked   • Poor oral hygiene Not Asked   • Bike safety Not Asked   • Family concerns vehicle safety Not Asked   Social History Narrative    Lives with mother.  Has sister.  4th grade.      Social Determinants of Health     Financial Resource Strain:    • Difficulty of Paying Living Expenses: Not on file   Food Insecurity:    • Worried About Running Out of Food in the Last Year: Not on file   • Ran Out of Food in the Last Year: Not on file   Transportation Needs:    • Lack of Transportation (Medical): Not on file   • Lack of Transportation (Non-Medical): Not on file   Physical Activity:    • Days of Exercise per Week: Not on file   • Minutes of Exercise per Session: Not on file   Stress:    • Feeling of Stress : Not on file   Social Connections:    • Frequency of Communication with Friends and Family: Not on file   • Frequency of Social Gatherings with Friends and Family: Not on file   • Attends Restorationism Services: Not on file   • Active Member of Clubs or Organizations: Not on file   • Attends Club or Organization Meetings: Not on file   • Marital Status: Not on file   Intimate Partner Violence:    • Fear of Current or Ex-Partner: Not on file   • Emotionally Abused: Not on file   • Physically Abused: Not on file   • Sexually Abused: Not on file   Housing Stability:    • Unable to Pay for Housing in the Last Year: Not on file   • Number of Places Lived in the Last Year: Not on file   • Unstable Housing in the Last Year: Not on file     Family History:    Family History   Adopted: Yes   Problem Relation Age of Onset   • Other Mother         adopted   • Other Father         adopted     Medications:    Current Outpatient  "Medications on File Prior to Visit   Medication Sig Dispense Refill   • MYRBETRIQ 50 MG TABLET SR 24 HR      • medroxyPROGESTERone (DEPO-PROVERA) 150 MG/ML Suspension Inject 150 mg into the shoulder, thigh, or buttocks one time.       No current facility-administered medications on file prior to visit.     Allergies:    No Known Allergies      Vitals:    Vitals:    01/22/22 1059   BP: 140/67   Pulse: 67   Resp: 16   Temp: 36.6 °C (97.9 °F)   TempSrc: Temporal   SpO2: 94%   Weight: 94.3 kg (208 lb)   Height: 1.702 m (5' 7\")       Physical Exam:    Constitutional: Vital signs reviewed. Appears well-developed and well-nourished. No acute distress.   Eyes: Sclera white, conjunctivae clear.   ENT: External ears normal. Hearing normal.  Lips/teeth are normal. Oral mucosa pink and moist. Posterior pharynx: WNL.  Neck: Neck supple.   Cardiovascular: Regular rate and rhythm. No murmur.  Pulmonary/Chest: Respirations non-labored. Clear to auscultation bilaterally.  Musculoskeletal: No muscular atrophy or weakness.  Neurological: Alert and oriented to person, place, and time. Muscle tone normal. Coordination normal.   Skin: No rashes or lesions. Warm, dry, normal turgor.  Psychiatric: Normal mood and affect. Behavior is normal. Judgment and thought content normal.       Diagnostics:    DX-CHEST-2 VIEWS  Order: [id number]   Status: Final result     Visible to patient: No (scheduled for 1/23/2022  9:39 AM)     Next appt: 02/07/2022 at 02:00 PM in Radiology (NIC MAIN XR)     Dx: Shortness of breath; Cough     0 Result Notes    Details    Reading Physician Reading Date Result Priority   Neil Fierro M.D.  [phone] 1/22/2022 Urgent Care     Narrative & Impression     1/22/2022 11:25 AM     HISTORY/REASON FOR EXAM:  Cough and shortness of breath.     TECHNIQUE/EXAM DESCRIPTION AND NUMBER OF VIEWS:  Two views of the chest.     COMPARISON: None.     FINDINGS:  There is no evidence of focal consolidation or evidence of pulmonary " edema.  The heart is normal in size.  There is no evidence of pleural effusion.  Soft tissues and bony structures are unremarkable.        IMPRESSION:     No evidence of acute cardiopulmonary process.     I personally reviewed the images. Rad report reviewed with her and copy of report to her.      Medical Decision Makin. Sore throat  - SARS-CoV-2 PCR (24 hour In-House): Collect NP swab in VTM; Future    2. Cough  - DX-CHEST-2 VIEWS; Future  - SARS-CoV-2 PCR (24 hour In-House): Collect NP swab in VTM; Future  - dexamethasone (DECADRON) 6 MG Tab; Take 1 Tablet by mouth every day for 7 days.  Dispense: 7 Tablet; Refill: 0    3. Shortness of breath  - DX-CHEST-2 VIEWS; Future  - SARS-CoV-2 PCR (24 hour In-House): Collect NP swab in VTM; Future  - dexamethasone (DECADRON) 6 MG Tab; Take 1 Tablet by mouth every day for 7 days.  Dispense: 7 Tablet; Refill: 0    4. Exacerbation of asthma, unspecified asthma severity, unspecified whether persistent  - DX-CHEST-2 VIEWS; Future  - dexamethasone (DECADRON) 6 MG Tab; Take 1 Tablet by mouth every day for 7 days.  Dispense: 7 Tablet; Refill: 0  - albuterol 108 (90 Base) MCG/ACT Aero Soln inhalation aerosol; 2 PUFFS EVERY 4 HOURS ONLY IF NEEDED FOR COUGH, WHEEZING, OR SHORTNESS OF BREATH.  Dispense: 8.5 g; Refill: 2    5. Encounter for screening for COVID-19  - SARS-CoV-2 PCR (24 hour In-House): Collect NP swab in VTM; Future      Discussed with her DDX, management options, and risks, benefits, and alternatives to treatment plan agreed upon.    Seen on 22 by other provider, had negative Covid test, visit reviewed. Has worsening sore throat, cough, and shortness of breath. Some headache. She did home Covid test last night - faintly positive. Has Asthma and has Albuterol MDI to use, but is , requests new Rx.    CXR today: No evidence of acute cardiopulmonary process.    Likely exacerbation of chronic condition (Asthma).     Agreeable to medications prescribed for  symptomatic treatment.    Agreeable to PCR COVID-19 test obtained. Out of rapid tests today.    Advised test result will show in MyChart.    Patient will follow-up if needed while waiting for test result.

## 2022-01-23 DIAGNOSIS — J02.9 SORE THROAT: ICD-10-CM

## 2022-01-23 DIAGNOSIS — Z11.52 ENCOUNTER FOR SCREENING FOR COVID-19: ICD-10-CM

## 2022-01-23 DIAGNOSIS — R06.02 SHORTNESS OF BREATH: ICD-10-CM

## 2022-01-23 DIAGNOSIS — R05.9 COUGH: ICD-10-CM

## 2022-01-23 LAB
COVID ORDER STATUS COVID19: NORMAL
SARS-COV-2 RNA RESP QL NAA+PROBE: DETECTED
SPECIMEN SOURCE: ABNORMAL

## 2022-01-27 ASSESSMENT — ENCOUNTER SYMPTOMS
COUGH: 1
FEVER: 1
CHILLS: 1
SORE THROAT: 1

## 2022-02-11 ENCOUNTER — HOSPITAL ENCOUNTER (OUTPATIENT)
Dept: LAB | Facility: MEDICAL CENTER | Age: 22
End: 2022-02-11
Attending: STUDENT IN AN ORGANIZED HEALTH CARE EDUCATION/TRAINING PROGRAM
Payer: COMMERCIAL

## 2022-02-11 ENCOUNTER — OFFICE VISIT (OUTPATIENT)
Dept: URGENT CARE | Facility: PHYSICIAN GROUP | Age: 22
End: 2022-02-11
Payer: COMMERCIAL

## 2022-02-11 ENCOUNTER — APPOINTMENT (OUTPATIENT)
Dept: RADIOLOGY | Facility: IMAGING CENTER | Age: 22
End: 2022-02-11
Attending: STUDENT IN AN ORGANIZED HEALTH CARE EDUCATION/TRAINING PROGRAM
Payer: COMMERCIAL

## 2022-02-11 VITALS
DIASTOLIC BLOOD PRESSURE: 88 MMHG | HEART RATE: 96 BPM | TEMPERATURE: 97.5 F | SYSTOLIC BLOOD PRESSURE: 132 MMHG | OXYGEN SATURATION: 98 % | HEIGHT: 67 IN | WEIGHT: 208 LBS | RESPIRATION RATE: 16 BRPM | BODY MASS INDEX: 32.65 KG/M2

## 2022-02-11 DIAGNOSIS — R10.30 LOWER ABDOMINAL PAIN: ICD-10-CM

## 2022-02-11 LAB — B-HCG SERPL-ACNC: <1 MIU/ML (ref 0–5)

## 2022-02-11 PROCEDURE — 74018 RADEX ABDOMEN 1 VIEW: CPT | Mod: TC,FY | Performed by: STUDENT IN AN ORGANIZED HEALTH CARE EDUCATION/TRAINING PROGRAM

## 2022-02-11 PROCEDURE — 84702 CHORIONIC GONADOTROPIN TEST: CPT

## 2022-02-11 PROCEDURE — 36415 COLL VENOUS BLD VENIPUNCTURE: CPT

## 2022-02-11 PROCEDURE — 99213 OFFICE O/P EST LOW 20 MIN: CPT | Performed by: STUDENT IN AN ORGANIZED HEALTH CARE EDUCATION/TRAINING PROGRAM

## 2022-02-11 ASSESSMENT — ENCOUNTER SYMPTOMS
FLANK PAIN: 0
CHILLS: 0
FEVER: 0

## 2022-02-11 ASSESSMENT — FIBROSIS 4 INDEX: FIB4 SCORE: 0.28

## 2022-02-11 NOTE — PROGRESS NOTES
"Subjective     Tamy Hines is a 21 y.o. female who presents with Medication Reaction (Depo shot reaction. )            Patient is very agreeable 21-year-old female the presents to clinic with complaints of breakthrough bleeding even though she has been on Depo for last 7 to 8 years.  Patient reports normally while she is taking her Depo shot she normally does not get menstrual cycles whatsoever.  However over the last several days she has been experiencing cramping and spotting of blood.  Bleeding has been mild and not persistent.  Patient presents to clinic for further testing and evaluation.  Patient I subjective fevers or chills denies change in bowel habitus denies malodorous urine abnormal vaginal discharge.  Patient has not had any new recent sexual partners.      Review of Systems   Constitutional: Negative for chills and fever.   Genitourinary: Positive for hematuria. Negative for flank pain, frequency and urgency.   All other systems reviewed and are negative.             Objective     /88   Pulse 96   Temp 36.4 °C (97.5 °F) (Temporal)   Resp 16   Ht 1.702 m (5' 7\")   Wt 94.3 kg (208 lb)   SpO2 98%   BMI 32.58 kg/m²      Physical Exam  Vitals reviewed.   Constitutional:       Appearance: Normal appearance.   Abdominal:      Tenderness: There is abdominal tenderness. There is no right CVA tenderness, left CVA tenderness or rebound.   Neurological:      Mental Status: She is alert.                             Assessment & Plan        1. Lower abdominal pain  21-year-old female with breakthrough menstrual bleeding bleeding despite being on Depo-Provera.  No CVA tenderness.  KUB complete normal urine pregnancy test complete normal.  Plan:  1.  KUB-results of which are complete normal  2.  Blood draw for beta hCG testing    Patient was counseled that should her symptoms suddenly worsen or not improve go the nearest emergency department.  Patient was counseled additionally the next step in " work-up will involve likely a transvaginal ultrasound although no indication or need for such testing at present.  Patient endorsed understanding.  - RP-EGOVVSE-7 VIEW; Future  - HCG QUANTITATIVE; Future

## 2022-02-23 ENCOUNTER — OFFICE VISIT (OUTPATIENT)
Dept: MEDICAL GROUP | Facility: PHYSICIAN GROUP | Age: 22
End: 2022-02-23
Payer: COMMERCIAL

## 2022-02-23 VITALS
RESPIRATION RATE: 14 BRPM | HEIGHT: 67 IN | OXYGEN SATURATION: 96 % | BODY MASS INDEX: 32.96 KG/M2 | TEMPERATURE: 97.6 F | WEIGHT: 210 LBS | DIASTOLIC BLOOD PRESSURE: 80 MMHG | HEART RATE: 87 BPM | SYSTOLIC BLOOD PRESSURE: 118 MMHG

## 2022-02-23 DIAGNOSIS — F33.1 MODERATE EPISODE OF RECURRENT MAJOR DEPRESSIVE DISORDER (HCC): ICD-10-CM

## 2022-02-23 DIAGNOSIS — S99.911S INJURY OF RIGHT ANKLE, SEQUELA: ICD-10-CM

## 2022-02-23 DIAGNOSIS — Z90.49 HISTORY OF LAPAROSCOPIC CHOLECYSTECTOMY: ICD-10-CM

## 2022-02-23 DIAGNOSIS — R19.7 WATERY DIARRHEA: ICD-10-CM

## 2022-02-23 PROBLEM — F32.9 MAJOR DEPRESSIVE DISORDER: Status: ACTIVE | Noted: 2022-02-23

## 2022-02-23 PROCEDURE — 99214 OFFICE O/P EST MOD 30 MIN: CPT | Performed by: FAMILY MEDICINE

## 2022-02-23 RX ORDER — FLUOXETINE HYDROCHLORIDE 20 MG/1
20 CAPSULE ORAL DAILY
Qty: 30 CAPSULE | Refills: 3 | Status: ON HOLD | OUTPATIENT
Start: 2022-02-23 | End: 2022-03-15

## 2022-02-23 ASSESSMENT — PATIENT HEALTH QUESTIONNAIRE - PHQ9
CLINICAL INTERPRETATION OF PHQ2 SCORE: 3
SUM OF ALL RESPONSES TO PHQ QUESTIONS 1-9: 15
5. POOR APPETITE OR OVEREATING: 3 - NEARLY EVERY DAY

## 2022-02-23 ASSESSMENT — FIBROSIS 4 INDEX: FIB4 SCORE: 0.28

## 2022-02-23 NOTE — ASSESSMENT & PLAN NOTE
She notes diarrhea improved with cholestyramine but she could not tolerate the taste of the medication which is an orange powder even if she mixed it with food or water.   Usually she will have diarrhea after she eats.   In the last week she has been having watery diarrhea every half to one hour. Feeling dehydrated, normal BP and HR in clinic today.   She had covid infection last month with wheezing shortness of breath but had not had diarrhea till last week.   Diarrhea has not improved with imodium  She tried bananas, apple sauce and toast for 3 days but it did not help.     She also started having a period again after 6 years, is on the depo which can cause this usually.     She has been evaluated by GI and thinks her provider at [facility] retired so I have placed a new referral for her to have a follow up.     A lot of increased stress, broke her right ankle in Dec, moved to a new apartment and got covid.   Hurts more when she wears a boot, she requests ref to PT    She has PHQ9 depression screening score of 15  Ref to psychology provided.    She moved to a new apt and can hear construction, trains, cars.   Also notes more anxiety, will treat with prozac

## 2022-02-23 NOTE — PROGRESS NOTES
Subjective:   Tamy Hines is a 21 y.o. female here today for evaluation and management of:     Watery diarrhea  She notes diarrhea improved with cholestyramine but she could not tolerate the taste of the medication which is an orange powder even if she mixed it with food or water.   Usually she will have diarrhea after she eats.   In the last week she has been having watery diarrhea every half to one hour. Feeling dehydrated, normal BP and HR in clinic today.   She had covid infection last month with wheezing shortness of breath but had not had diarrhea till last week.   Diarrhea has not improved with imodium  She tried bananas, apple sauce and toast for 3 days but it did not help.     She also started having a period again after 6 years, is on the depo which can cause this usually.     She has been evaluated by GI and thinks her provider at [facility] retired so I have placed a new referral for her to have a follow up.     A lot of increased stress, broke her right ankle in Dec, moved to a new apartment and got covid.   Hurts more when she wears a boot, she requests ref to PT    She has PHQ9 depression screening score of 15  Ref to psychology provided.    She moved to a new apt and can hear construction, trains, cars.   Also notes more anxiety, will treat with prozac             Current medicines (including changes today)  Current Outpatient Medications   Medication Sig Dispense Refill   • FLUoxetine (PROZAC) 20 MG Cap Take 1 Capsule by mouth every day. 30 Capsule 3   • MYRBETRIQ 50 MG TABLET SR 24 HR      • albuterol 108 (90 Base) MCG/ACT Aero Soln inhalation aerosol 2 PUFFS EVERY 4 HOURS ONLY IF NEEDED FOR COUGH, WHEEZING, OR SHORTNESS OF BREATH. 8.5 g 2   • medroxyPROGESTERone (DEPO-PROVERA) 150 MG/ML Suspension Inject 150 mg into the shoulder, thigh, or buttocks one time.       No current facility-administered medications for this visit.     She  has a past medical history of Allergic rhinitis, ASTHMA  "(8/15/2013), Bowel habit changes, Gynecological disorder, Heart burn, Indigestion, Menarche (8/15/2013), and Nausea, vomiting and diarrhea (11/19/2014).    ROS  No chest pain, no shortness of breath, no abdominal pain       Objective:     /80   Pulse 87   Temp 36.4 °C (97.6 °F) (Temporal)   Resp 14   Ht 1.702 m (5' 7\")   Wt 95.3 kg (210 lb)   SpO2 96%  Body mass index is 32.89 kg/m².   Physical Exam:  Constitutional: Alert, no distress.  Skin: Warm, dry, good turgor, no rashes in visible areas.  Eye: Equal, round and reactive, conjunctiva clear, lids normal.  ENMT: Lips without lesions, good dentition, oropharynx clear.  Neck: Trachea midline, no masses, no thyromegaly. No cervical or supraclavicular lymphadenopathy  Respiratory: Unlabored respiratory effort, lungs clear to auscultation, no wheezes, no ronchi.  Cardiovascular: Normal S1, S2, no murmur, no edema.  Abdomen: Soft, non-tender, no masses, no hepatosplenomegaly.  Psych: Alert and oriented x3, normal affect and mood.        Assessment and Plan:   The following treatment plan was discussed    1. Watery diarrhea    - Referral to Gastroenterology    2. History of laparoscopic cholecystectomy    - Referral to Gastroenterology    3. Injury of right ankle, sequela    - Referral to Physical Therapy    4. Moderate episode of recurrent major depressive disorder (HCC)    - Patient has been identified as having a positive depression screening. Appropriate orders and counseling have been given.  - Referral to Psychology  - FLUoxetine (PROZAC) 20 MG Cap; Take 1 Capsule by mouth every day.  Dispense: 30 Capsule; Refill: 3      Followup: Return in about 3 months (around 5/23/2022) for diarrhea, depression.         "

## 2022-03-02 PROBLEM — S82.401K: Status: ACTIVE | Noted: 2022-03-02

## 2022-03-02 PROBLEM — S82.201K: Status: ACTIVE | Noted: 2022-03-02

## 2022-03-03 NOTE — H&P (VIEW-ONLY)
Subjective   Patient ID:  Tamy Hines is a 21 y.o. female.    Chief Complaint:  Results of the Right Ankle    Last Surgery: No surgery found on No surgery found    HPI there is a very pleasant 21-year-old female who twisted her right ankle in December.  She sustained a distal fibula fracture that was nondisplaced and it was treated appropriately nonoperatively.  Unfortunately she is gone on to have persistent and worsening pain both over the distal fibula as well as along her anterolateral and posterior lateral leg.  An MRI is available for review.    Review of Systems    Objective   Ortho Exam  Alert and oriented no apparent stress.  Normal hindfoot alignment.  She is exquisitely tender over her lateral fibula distally, she is also tender over her anterior syndesmosis.  She does not have any pain over her deltoid or anterior ankle joint line.  She is neurovascularly intact throughout.  She has a negative anterior drawer and talar tilt.    Last Imaging Result(s):   Previous x-rays are available for review shows persistence of the Maurer B transverse distal fibula fracture that has not healed.  MRI shows this as well with clear fluid within the fibula at the fracture site.  She also has apparent disruption of her syndesmosis.    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  I recommended an arthroscopic debridement of her joint with stress of her syndesmosis, ORIF of her distal fibula, likely syndesmotic fixation.  She will be nonweightbearing for 10 to 14 days followed by weightbearing progression in a brace or boot, whichever is comfortable.  I filled out a scheduling form and look  forward to seeing her on her scheduled date.  Return for Post-Op, Imaging.

## 2022-03-09 ENCOUNTER — PRE-ADMISSION TESTING (OUTPATIENT)
Dept: ADMISSIONS | Facility: MEDICAL CENTER | Age: 22
End: 2022-03-09
Attending: ORTHOPAEDIC SURGERY
Payer: COMMERCIAL

## 2022-03-09 RX ORDER — OMEGA-3 FATTY ACIDS/FISH OIL 300-1000MG
600 CAPSULE ORAL DAILY
COMMUNITY
End: 2023-02-10

## 2022-03-09 NOTE — DISCHARGE PLANNING
DISCHARGE PLANNING NOTE -    Procedure: Procedure(s):  RIGHT OPEN REDUCTION INTERNAL FIXATION FIBULA NONUNION, SYNDESMOSIS REPAIR, ANKLE ARTHROSCOPY  ARTHROSCOPY, ANKLE  Procedure Date: 3/15/2022  Insurance: Payor: [place]LE / Plan: [place]LE / Product Type: *No Product type* /    Equipment currently available at home?  none  Steps into the home? 0  Steps within the home? 0  Toilet height?   Type of shower? tub-shower  Who will be with you during your recovery? boyfriend  Is Outpatient Physical Therapy set up after surgery? Yes  Planning same day discharge?Yes     Plan: Spoke with patient via phone for preadmit appointment. Patient told she would be NWB for 2 weeks. Tub transfer bench, crutches and knee  recommended for patient. Patient may be able to borrow some DME from family. Equipment list and home safety checklist reviewed and emailed to patient. Anticipate discharge home with support from mother and boyfriend.

## 2022-03-15 ENCOUNTER — ANESTHESIA EVENT (OUTPATIENT)
Dept: SURGERY | Facility: MEDICAL CENTER | Age: 22
End: 2022-03-15
Payer: COMMERCIAL

## 2022-03-15 ENCOUNTER — ANESTHESIA (OUTPATIENT)
Dept: SURGERY | Facility: MEDICAL CENTER | Age: 22
End: 2022-03-15
Payer: COMMERCIAL

## 2022-03-15 ENCOUNTER — APPOINTMENT (OUTPATIENT)
Dept: RADIOLOGY | Facility: MEDICAL CENTER | Age: 22
End: 2022-03-15
Attending: ORTHOPAEDIC SURGERY
Payer: COMMERCIAL

## 2022-03-15 ENCOUNTER — HOSPITAL ENCOUNTER (OUTPATIENT)
Facility: MEDICAL CENTER | Age: 22
Setting detail: OUTPATIENT SURGERY
End: 2022-03-15
Attending: ORTHOPAEDIC SURGERY | Admitting: ORTHOPAEDIC SURGERY
Payer: COMMERCIAL

## 2022-03-15 VITALS
HEART RATE: 76 BPM | BODY MASS INDEX: 32.49 KG/M2 | DIASTOLIC BLOOD PRESSURE: 76 MMHG | WEIGHT: 207.01 LBS | OXYGEN SATURATION: 94 % | RESPIRATION RATE: 16 BRPM | SYSTOLIC BLOOD PRESSURE: 144 MMHG | TEMPERATURE: 98.4 F | HEIGHT: 67 IN

## 2022-03-15 DIAGNOSIS — S82.201K: ICD-10-CM

## 2022-03-15 DIAGNOSIS — S82.401K: ICD-10-CM

## 2022-03-15 LAB — HCG UR QL: NEGATIVE

## 2022-03-15 PROCEDURE — 81025 URINE PREGNANCY TEST: CPT

## 2022-03-15 PROCEDURE — 700105 HCHG RX REV CODE 258: Performed by: ORTHOPAEDIC SURGERY

## 2022-03-15 PROCEDURE — A6222 GAUZE <=16 IN NO W/SAL W/O B: HCPCS | Performed by: ORTHOPAEDIC SURGERY

## 2022-03-15 PROCEDURE — 64445 NJX AA&/STRD SCIATIC NRV IMG: CPT | Performed by: ORTHOPAEDIC SURGERY

## 2022-03-15 PROCEDURE — 160046 HCHG PACU - 1ST 60 MINS PHASE II: Performed by: ORTHOPAEDIC SURGERY

## 2022-03-15 PROCEDURE — 160041 HCHG SURGERY MINUTES - EA ADDL 1 MIN LEVEL 4: Performed by: ORTHOPAEDIC SURGERY

## 2022-03-15 PROCEDURE — 29898 ANKLE ARTHROSCOPY/SURGERY: CPT | Performed by: ORTHOPAEDIC SURGERY

## 2022-03-15 PROCEDURE — A9270 NON-COVERED ITEM OR SERVICE: HCPCS | Performed by: ANESTHESIOLOGY

## 2022-03-15 PROCEDURE — 700102 HCHG RX REV CODE 250 W/ 637 OVERRIDE(OP): Performed by: ANESTHESIOLOGY

## 2022-03-15 PROCEDURE — 700101 HCHG RX REV CODE 250: Performed by: ANESTHESIOLOGY

## 2022-03-15 PROCEDURE — 29898 ANKLE ARTHROSCOPY/SURGERY: CPT | Mod: ASROC | Performed by: NURSE PRACTITIONER

## 2022-03-15 PROCEDURE — 27726 REPAIR FIBULA NONUNION: CPT | Performed by: ORTHOPAEDIC SURGERY

## 2022-03-15 PROCEDURE — 27726 REPAIR FIBULA NONUNION: CPT | Mod: ASROC | Performed by: NURSE PRACTITIONER

## 2022-03-15 PROCEDURE — 64447 NJX AA&/STRD FEMORAL NRV IMG: CPT | Performed by: ORTHOPAEDIC SURGERY

## 2022-03-15 PROCEDURE — 27829 TREAT LOWER LEG JOINT: CPT | Mod: RT | Performed by: ORTHOPAEDIC SURGERY

## 2022-03-15 PROCEDURE — 160009 HCHG ANES TIME/MIN: Performed by: ORTHOPAEDIC SURGERY

## 2022-03-15 PROCEDURE — 73610 X-RAY EXAM OF ANKLE: CPT | Mod: RT

## 2022-03-15 PROCEDURE — 700111 HCHG RX REV CODE 636 W/ 250 OVERRIDE (IP): Performed by: ANESTHESIOLOGY

## 2022-03-15 PROCEDURE — 501838 HCHG SUTURE GENERAL: Performed by: ORTHOPAEDIC SURGERY

## 2022-03-15 PROCEDURE — 27829 TREAT LOWER LEG JOINT: CPT | Mod: ASROC,RT | Performed by: NURSE PRACTITIONER

## 2022-03-15 PROCEDURE — 160025 RECOVERY II MINUTES (STATS): Performed by: ORTHOPAEDIC SURGERY

## 2022-03-15 PROCEDURE — 500881 HCHG PACK, EXTREMITY: Performed by: ORTHOPAEDIC SURGERY

## 2022-03-15 PROCEDURE — A6454 SELF-ADHER BAND W>=3" <5"/YD: HCPCS | Performed by: ORTHOPAEDIC SURGERY

## 2022-03-15 PROCEDURE — C1713 ANCHOR/SCREW BN/BN,TIS/BN: HCPCS | Performed by: ORTHOPAEDIC SURGERY

## 2022-03-15 PROCEDURE — 160029 HCHG SURGERY MINUTES - 1ST 30 MINS LEVEL 4: Performed by: ORTHOPAEDIC SURGERY

## 2022-03-15 PROCEDURE — 160002 HCHG RECOVERY MINUTES (STAT): Performed by: ORTHOPAEDIC SURGERY

## 2022-03-15 PROCEDURE — 502000 HCHG MISC OR IMPLANTS RC 0278: Performed by: ORTHOPAEDIC SURGERY

## 2022-03-15 PROCEDURE — 160035 HCHG PACU - 1ST 60 MINS PHASE I: Performed by: ORTHOPAEDIC SURGERY

## 2022-03-15 PROCEDURE — 700105 HCHG RX REV CODE 258: Performed by: ANESTHESIOLOGY

## 2022-03-15 PROCEDURE — 160048 HCHG OR STATISTICAL LEVEL 1-5: Performed by: ORTHOPAEDIC SURGERY

## 2022-03-15 DEVICE — SCREW BONE VARIAX T10 FULL THREAD L14 MM OD3.5 MM FOOT ANKLE STARDRIVE NONSTERILE: Type: IMPLANTABLE DEVICE | Site: ANKLE | Status: FUNCTIONAL

## 2022-03-15 DEVICE — IMPLANTABLE DEVICE: Type: IMPLANTABLE DEVICE | Site: ANKLE | Status: FUNCTIONAL

## 2022-03-15 DEVICE — SCREW BONE VARIAX T10 FULL THREAD L16 MM OD3.5 MM FOOT ANKLE STARDRIVE NONSTERILE: Type: IMPLANTABLE DEVICE | Site: ANKLE | Status: FUNCTIONAL

## 2022-03-15 RX ORDER — SODIUM CHLORIDE, SODIUM LACTATE, POTASSIUM CHLORIDE, CALCIUM CHLORIDE 600; 310; 30; 20 MG/100ML; MG/100ML; MG/100ML; MG/100ML
INJECTION, SOLUTION INTRAVENOUS CONTINUOUS
Status: DISCONTINUED | OUTPATIENT
Start: 2022-03-15 | End: 2022-03-15 | Stop reason: HOSPADM

## 2022-03-15 RX ORDER — ONDANSETRON 2 MG/ML
INJECTION INTRAMUSCULAR; INTRAVENOUS PRN
Status: DISCONTINUED | OUTPATIENT
Start: 2022-03-15 | End: 2022-03-15 | Stop reason: SURG

## 2022-03-15 RX ORDER — LIDOCAINE HYDROCHLORIDE 20 MG/ML
INJECTION, SOLUTION EPIDURAL; INFILTRATION; INTRACAUDAL; PERINEURAL PRN
Status: DISCONTINUED | OUTPATIENT
Start: 2022-03-15 | End: 2022-03-15 | Stop reason: SURG

## 2022-03-15 RX ORDER — HYDROMORPHONE HYDROCHLORIDE 1 MG/ML
0.1 INJECTION, SOLUTION INTRAMUSCULAR; INTRAVENOUS; SUBCUTANEOUS
Status: DISCONTINUED | OUTPATIENT
Start: 2022-03-15 | End: 2022-03-15 | Stop reason: HOSPADM

## 2022-03-15 RX ORDER — ROPIVACAINE HYDROCHLORIDE 5 MG/ML
INJECTION, SOLUTION EPIDURAL; INFILTRATION; PERINEURAL PRN
Status: DISCONTINUED | OUTPATIENT
Start: 2022-03-15 | End: 2022-03-15 | Stop reason: SURG

## 2022-03-15 RX ORDER — HYDROMORPHONE HYDROCHLORIDE 1 MG/ML
0.2 INJECTION, SOLUTION INTRAMUSCULAR; INTRAVENOUS; SUBCUTANEOUS
Status: DISCONTINUED | OUTPATIENT
Start: 2022-03-15 | End: 2022-03-15 | Stop reason: HOSPADM

## 2022-03-15 RX ORDER — CEFAZOLIN SODIUM 1 G/3ML
2 INJECTION, POWDER, FOR SOLUTION INTRAMUSCULAR; INTRAVENOUS ONCE
Status: DISCONTINUED | OUTPATIENT
Start: 2022-03-15 | End: 2022-03-15 | Stop reason: HOSPADM

## 2022-03-15 RX ORDER — HALOPERIDOL 5 MG/ML
1 INJECTION INTRAMUSCULAR
Status: DISCONTINUED | OUTPATIENT
Start: 2022-03-15 | End: 2022-03-15 | Stop reason: HOSPADM

## 2022-03-15 RX ORDER — MEPERIDINE HYDROCHLORIDE 25 MG/ML
12.5 INJECTION INTRAMUSCULAR; INTRAVENOUS; SUBCUTANEOUS
Status: DISCONTINUED | OUTPATIENT
Start: 2022-03-15 | End: 2022-03-15 | Stop reason: HOSPADM

## 2022-03-15 RX ORDER — ONDANSETRON 2 MG/ML
4 INJECTION INTRAMUSCULAR; INTRAVENOUS
Status: COMPLETED | OUTPATIENT
Start: 2022-03-15 | End: 2022-03-15

## 2022-03-15 RX ORDER — CEFAZOLIN SODIUM 1 G/3ML
INJECTION, POWDER, FOR SOLUTION INTRAMUSCULAR; INTRAVENOUS PRN
Status: DISCONTINUED | OUTPATIENT
Start: 2022-03-15 | End: 2022-03-15 | Stop reason: SURG

## 2022-03-15 RX ORDER — DEXAMETHASONE SODIUM PHOSPHATE 4 MG/ML
INJECTION, SOLUTION INTRA-ARTICULAR; INTRALESIONAL; INTRAMUSCULAR; INTRAVENOUS; SOFT TISSUE PRN
Status: DISCONTINUED | OUTPATIENT
Start: 2022-03-15 | End: 2022-03-15 | Stop reason: SURG

## 2022-03-15 RX ORDER — HYDRALAZINE HYDROCHLORIDE 20 MG/ML
5 INJECTION INTRAMUSCULAR; INTRAVENOUS
Status: DISCONTINUED | OUTPATIENT
Start: 2022-03-15 | End: 2022-03-15 | Stop reason: HOSPADM

## 2022-03-15 RX ORDER — METOCLOPRAMIDE HYDROCHLORIDE 5 MG/ML
INJECTION INTRAMUSCULAR; INTRAVENOUS PRN
Status: DISCONTINUED | OUTPATIENT
Start: 2022-03-15 | End: 2022-03-15 | Stop reason: SURG

## 2022-03-15 RX ORDER — GABAPENTIN 300 MG/1
300 CAPSULE ORAL ONCE
Status: COMPLETED | OUTPATIENT
Start: 2022-03-15 | End: 2022-03-15

## 2022-03-15 RX ORDER — LABETALOL HYDROCHLORIDE 5 MG/ML
5 INJECTION, SOLUTION INTRAVENOUS
Status: DISCONTINUED | OUTPATIENT
Start: 2022-03-15 | End: 2022-03-15 | Stop reason: HOSPADM

## 2022-03-15 RX ORDER — OXYCODONE HCL 5 MG/5 ML
5 SOLUTION, ORAL ORAL
Status: DISCONTINUED | OUTPATIENT
Start: 2022-03-15 | End: 2022-03-15 | Stop reason: HOSPADM

## 2022-03-15 RX ORDER — KETOROLAC TROMETHAMINE 30 MG/ML
INJECTION, SOLUTION INTRAMUSCULAR; INTRAVENOUS PRN
Status: DISCONTINUED | OUTPATIENT
Start: 2022-03-15 | End: 2022-03-15 | Stop reason: SURG

## 2022-03-15 RX ORDER — ACETAMINOPHEN 500 MG
1000 TABLET ORAL ONCE
Status: COMPLETED | OUTPATIENT
Start: 2022-03-15 | End: 2022-03-15

## 2022-03-15 RX ORDER — OXYCODONE HCL 5 MG/5 ML
10 SOLUTION, ORAL ORAL
Status: DISCONTINUED | OUTPATIENT
Start: 2022-03-15 | End: 2022-03-15 | Stop reason: HOSPADM

## 2022-03-15 RX ORDER — SODIUM CHLORIDE, SODIUM LACTATE, POTASSIUM CHLORIDE, CALCIUM CHLORIDE 600; 310; 30; 20 MG/100ML; MG/100ML; MG/100ML; MG/100ML
INJECTION, SOLUTION INTRAVENOUS
Status: DISCONTINUED | OUTPATIENT
Start: 2022-03-15 | End: 2022-03-15 | Stop reason: SURG

## 2022-03-15 RX ORDER — HYDROMORPHONE HYDROCHLORIDE 1 MG/ML
0.4 INJECTION, SOLUTION INTRAMUSCULAR; INTRAVENOUS; SUBCUTANEOUS
Status: DISCONTINUED | OUTPATIENT
Start: 2022-03-15 | End: 2022-03-15 | Stop reason: HOSPADM

## 2022-03-15 RX ORDER — ACETAMINOPHEN 500 MG
1000 TABLET ORAL
COMMUNITY
End: 2023-02-10

## 2022-03-15 RX ADMIN — GABAPENTIN 300 MG: 300 CAPSULE ORAL at 07:12

## 2022-03-15 RX ADMIN — SODIUM CHLORIDE, POTASSIUM CHLORIDE, SODIUM LACTATE AND CALCIUM CHLORIDE: 600; 310; 30; 20 INJECTION, SOLUTION INTRAVENOUS at 07:49

## 2022-03-15 RX ADMIN — CEFAZOLIN 2 G: 330 INJECTION, POWDER, FOR SOLUTION INTRAMUSCULAR; INTRAVENOUS at 08:02

## 2022-03-15 RX ADMIN — DEXAMETHASONE SODIUM PHOSPHATE 4 MG: 4 INJECTION, SOLUTION INTRA-ARTICULAR; INTRALESIONAL; INTRAMUSCULAR; INTRAVENOUS; SOFT TISSUE at 08:00

## 2022-03-15 RX ADMIN — DEXAMETHASONE SODIUM PHOSPHATE 4 MG: 4 INJECTION, SOLUTION INTRA-ARTICULAR; INTRALESIONAL; INTRAMUSCULAR; INTRAVENOUS; SOFT TISSUE at 08:02

## 2022-03-15 RX ADMIN — ROPIVACAINE HYDROCHLORIDE 20 ML: 5 INJECTION, SOLUTION EPIDURAL; INFILTRATION; PERINEURAL at 07:56

## 2022-03-15 RX ADMIN — ONDANSETRON 4 MG: 2 INJECTION INTRAMUSCULAR; INTRAVENOUS at 08:55

## 2022-03-15 RX ADMIN — DEXAMETHASONE SODIUM PHOSPHATE 4 MG: 4 INJECTION, SOLUTION INTRA-ARTICULAR; INTRALESIONAL; INTRAMUSCULAR; INTRAVENOUS; SOFT TISSUE at 07:56

## 2022-03-15 RX ADMIN — ONDANSETRON 4 MG: 2 INJECTION INTRAMUSCULAR; INTRAVENOUS at 08:34

## 2022-03-15 RX ADMIN — KETOROLAC TROMETHAMINE 30 MG: 30 INJECTION, SOLUTION INTRAMUSCULAR at 08:34

## 2022-03-15 RX ADMIN — METOCLOPRAMIDE 10 MG: 5 INJECTION, SOLUTION INTRAMUSCULAR; INTRAVENOUS at 08:34

## 2022-03-15 RX ADMIN — ACETAMINOPHEN 1000 MG: 500 TABLET ORAL at 07:12

## 2022-03-15 RX ADMIN — FENTANYL CITRATE 100 MCG: 50 INJECTION, SOLUTION INTRAMUSCULAR; INTRAVENOUS at 08:02

## 2022-03-15 RX ADMIN — MIDAZOLAM 2 MG: 1 INJECTION INTRAMUSCULAR; INTRAVENOUS at 07:49

## 2022-03-15 RX ADMIN — PROPOFOL 200 MG: 10 INJECTION, EMULSION INTRAVENOUS at 08:02

## 2022-03-15 RX ADMIN — ROPIVACAINE HYDROCHLORIDE 20 ML: 5 INJECTION, SOLUTION EPIDURAL; INFILTRATION; PERINEURAL at 08:00

## 2022-03-15 RX ADMIN — LIDOCAINE HYDROCHLORIDE 100 MG: 20 INJECTION, SOLUTION EPIDURAL; INFILTRATION; INTRACAUDAL at 08:02

## 2022-03-15 RX ADMIN — SODIUM CHLORIDE, POTASSIUM CHLORIDE, SODIUM LACTATE AND CALCIUM CHLORIDE: 600; 310; 30; 20 INJECTION, SOLUTION INTRAVENOUS at 07:08

## 2022-03-15 ASSESSMENT — FIBROSIS 4 INDEX: FIB4 SCORE: 0.28

## 2022-03-15 ASSESSMENT — PAIN DESCRIPTION - PAIN TYPE
TYPE: SURGICAL PAIN

## 2022-03-15 NOTE — ANESTHESIA PROCEDURE NOTES
Airway    Date/Time: 3/15/2022 8:00 AM  Performed by: Jesus Foy M.D.  Authorized by: Jesus Foy M.D.     Location:  OR  Urgency:  Elective  Indications for Airway Management:  Anesthesia      Spontaneous Ventilation: absent    Sedation Level:  Deep  Preoxygenated: Yes    Final Airway Type:  Supraglottic airway  Final Supraglottic Airway:  Standard LMA    SGA Size:  4  Number of Attempts at Approach:  1

## 2022-03-15 NOTE — ANESTHESIA PROCEDURE NOTES
Peripheral Block    Date/Time: 3/15/2022 7:55 AM  Performed by: Jesus Foy M.D.  Authorized by: Jesus Foy M.D.     Patient Location:  OR  Start Time:  3/15/2022 7:55 AM  End Time:  3/15/2022 7:58 AM  Reason for Block: at surgeon's request and post-op pain management ONLY    patient identified, IV checked, site marked, risks and benefits discussed, surgical consent, monitors and equipment checked, pre-op evaluation and timeout performed    Patient Position:  Supine  Prep: ChloraPrep    Monitoring:  Heart rate, continuous pulse ox and cardiac monitor  Block Region:  Lower Extremity  Lower Extremity - Block Type:  SCIATIC nerve block, lateral approach    Laterality:  Right  Procedures: ultrasound guided  Image captured, interpreted and electronically stored.  Local Infiltration:  Lidocaine  Strength:  1 %  Dose:  3 ml  Block Type:  Single-shot  Needle Length:  100mm  Needle Gauge:  21 G  Needle Localization:  Ultrasound guidance  Injection Assessment:  Negative aspiration for heme, no paresthesia on injection, incremental injection and local visualized surrounding nerve on ultrasound  Evidence of intravascular injection: No

## 2022-03-15 NOTE — OR NURSING
Patient recovered well in post-op. AAOx4. VSS, on RA. Surgical sites TENZIN, surgical dressing in place CDI. RLE NWB. No pain throughout recovery. Antiemetics administered for nausea. Patient then able to intake fluids without issue. Mom updated and discussed POC. Glasses in place, no other belongings in recovery. Report called to SANDY Horta. Patient transported to phase II w/ RN.

## 2022-03-15 NOTE — OP REPORT
DATE OF SERVICE:  03/15/2022     PREOPERATIVE DIAGNOSES:  1.  Right ankle pain.  2.  Right fibula nonunion.  3.  Right syndesmotic instability.     POSTOPERATIVE DIAGNOSES:  1.  Right ankle pain.  2.  Right fibula nonunion.  3.  Right syndesmotic instability.     PROCEDURES:  1.  Right ankle arthroscopy with extensive debridement.  2.  Right open reduction and internal fixation of fibula nonunion.  3.  Right open reduction and internal fixation of syndesmosis.     SURGEON:  Erwin Smith MD     ASSISTANT:  GOLDEN Dong     ANESTHESIA:  General with peripheral nerve block requested by me for   postoperative pain control.     ESTIMATED BLOOD LOSS:  10 mL     TOURNIQUET TIME:  22 minutes at 250 mmHg.     IMPLANTS:  1.  Wilcox 6-hole 1/3 tubular plate.  2.  Infectious SynchFix syndesmotic fixation device.     COMPLICATIONS:  None.     OUTCOME:  PACU in stable condition.     HISTORY OF PRESENT ILLNESS:  This is a pleasant 21-year-old female who had   persistent pain related to a fibula fracture she sustained at the end of last   year.  She was treated appropriately nonoperatively, but unfortunately has   gone on to a nonunion both clinically and on MRI I think likely secondary to   syndesmotic instability.  She was greeted in the preoperative holding area and   identified by name and medical record number.  The right lower extremity was   marked.  Risks of procedure including bleeding, infection, pain, malunion,   nonunion, neurovascular damage, continuation of symptoms, need for more   surgery were discussed and she provided written consent.     DESCRIPTION OF PROCEDURE:  She was taken to the operating room and placed on   table in supine position.  Preoperative antibiotics were administered.    General anesthesia was induced.  Nonsterile tourniquet was placed on her right   thigh.  Right lower extremity prepped and draped in the usual sterile   fashion.  Operative pause was taken where all present  were in agreement with   patient identification, laterality and procedure to be performed.  The limb   was elevated for 5 minutes, tourniquet raised to 250 mmHg.     The joint was insufflated with 10 mL sterile normal saline.  A medial portal   was made at the level of joint just medial to the tibialis anterior tendon.  A   medial portal was established followed by a small joint arthroscope,   immediately seen was arthrofibrotic tissue within the joint as well as   thickened Englewood's ligament.  We made a lateral portal, removed Englewood's   ligament.  We confirmed syndesmotic instability.  I did feel that the deltoid   was stable.  We removed small osteophytes from the anterior distal tibia and   the dorsal talar neck.  We evaluated the entirety of the joint surface and did   not note any partial or full thickness defects.  Arthroscopic instruments   were withdrawn.     I made a 6 cm longitudinal incision along the posterolateral border of the   distal fibula.  She had minimally displaced oblique fibular fracture that we   compressed using a straight plate that was under contoured to the bone.  We   used five 3.5 mm nonlocking screws.  We then stressed the syndesmosis and   found it to be persistently unstable.  We were able to hold an anatomic   reduction while we drilled for and placed a SynchFix syndesmotic fixation   device with a small medial incision to seat the plate.  At this point, the   ankle was quite stable to external rotation stress.  All hardware in proper   alignment.  No evidence of interval complication.  The tourniquet was let   down.  Hemostasis was achieved.  Incisions copiously irrigated.  The fascial   layer closed over the plate with 3-0 Monocryl in figure-of-eight fashion,   subcutaneous layer was closed with 3-0 Monocryl in buried interrupted fashion,   skin closed with 3-0 nylon in horizontal mattress fashion.  Adaptic gauze and   a well-padded posterior splint with stirrup were placed.   She was awakened   and extubated in stable condition.     POSTOPERATIVE COURSE:  She will be discharged home today assuming adequate   pain control, mobilization, nonweightbearing right lower extremity,  aspirin   81 mg twice daily for DVT prophylaxis.  I will see her in 10-14 days for   suture removal and wound check.  We will transition her to a Cam walker boot,   begin physical therapy at that time.        ______________________________  MD KARINA GOTTI/FELIPE    DD:  03/15/2022 08:48  DT:  03/15/2022 09:18    Job#:  [id number]

## 2022-03-15 NOTE — ANESTHESIA TIME REPORT
Anesthesia Start and Stop Event Times     Date Time Event    3/15/2022 0655 Ready for Procedure     0749 Anesthesia Start     0849 Anesthesia Stop        Responsible Staff  03/15/22    Name Role Begin End    Jesus Foy M.D. Anesth 0749 0849        Preop Diagnosis (Free Text):  Pre-op Diagnosis     Closed fracture of tibia and fibula with nonunion, right        Preop Diagnosis (Codes):  Diagnosis Information     Diagnosis Code(s): Closed fracture of tibia and fibula with nonunion, right [S82.201K, S82.401K]        Premium Reason  Non-Premium    Comments:

## 2022-03-15 NOTE — ANESTHESIA PREPROCEDURE EVALUATION
Case: [id number] Anesthesia Start Date/Time: 03/15/22 0749    Procedures:       RIGHT OPEN REDUCTION INTERNAL FIXATION FIBULA NONUNION, SYNDESMOSIS REPAIR, ANKLE ARTHROSCOPY (Right )      ARTHROSCOPY, ANKLE    Diagnosis: Closed fracture of tibia and fibula with nonunion, right [S82.201K, S82.401K]    Pre-op diagnosis: Closed fracture of tibia and fibula with nonunion, right [S82.201K, S82.401K]    Location: [place] [address] / SURGERY [facility]    Surgeons: Erwin Smith M.D.      Obesity    Relevant Problems   PULMONARY   (positive) Asthma, mild intermittent, well-controlled      GI   (positive) Gastroesophageal reflux disease with esophagitis      Other   (positive) Anxiety, generalized   (positive) Closed fracture of tibia and fibula with nonunion, right   (positive) Major depressive disorder       Physical Exam    Airway   Mallampati: II  TM distance: >3 FB  Neck ROM: full       Cardiovascular - normal exam  Rhythm: regular  Rate: normal  (-) murmur     Dental - normal exam           Pulmonary - normal exam  Breath sounds clear to auscultation     Abdominal    Neurological - normal exam                 Anesthesia Plan    ASA 2       Plan - general and peripheral nerve block     Peripheral nerve block will be post-op pain control  Airway plan will be LMA          Induction: intravenous    Postoperative Plan: Postoperative administration of opioids is intended.    Pertinent diagnostic labs and testing reviewed    Informed Consent:    Anesthetic plan and risks discussed with patient, father and mother.

## 2022-03-15 NOTE — ANESTHESIA POSTPROCEDURE EVALUATION
Patient: Pema Hines    Procedure Summary     Date: 03/15/22 Room / Location: [place] [address] / SURGERY [facility]    Anesthesia Start: 0749 Anesthesia Stop: 0849    Procedures:       RIGHT OPEN REDUCTION INTERNAL FIXATION FIBULA NONUNION, SYNDESMOSIS REPAIR, ANKLE ARTHROSCOPY (Right Ankle)      ARTHROSCOPY, ANKLE (Right Ankle) Diagnosis:       Closed fracture of tibia and fibula with nonunion, right      (Closed fracture of tibia and fibula with nonunion, right)    Surgeons: Erwin Smith M.D. Responsible Provider: Jesus Foy M.D.    Anesthesia Type: general, peripheral nerve block ASA Status: 2          Final Anesthesia Type: general, peripheral nerve block  Last vitals  BP   Blood Pressure: 113/56    Temp   36.9 °C (98.4 °F)    Pulse   63   Resp   15    SpO2   94 %      Anesthesia Post Evaluation    Patient location during evaluation: PACU  Patient participation: complete - patient participated  Level of consciousness: awake and alert    Airway patency: patent  Anesthetic complications: no  Cardiovascular status: hemodynamically stable  Respiratory status: acceptable  Hydration status: euvolemic    PONV: none          No complications documented.     Nurse Pain Score: 0 (NPRS)

## 2022-03-15 NOTE — OR NURSING
0935: Report received from SANDY Dobbs. Patient to Phase II 24 pending transport.    0945: Patient to Phase II Rm 24. Right leg dressing CDI. Vitals taken upon arrival. Plan of care discussed with patient.    0955: Patient and family updated regarding Plan of care.    1000: Patient provided with water. Patient tolerating PO intake.  Patient belongings returned to patient at bedside.    1010: Pt discharged home. Discharge instructions given to pt prior to leaving unit including when to see PCP, and new medications if applicable. Crutches provided to patient. PIV removed. All questions answered. Verbalized understanding. All belongings with pt when leaving unit via wheelchair with CNA.

## 2022-03-15 NOTE — DISCHARGE INSTRUCTIONS
ACTIVITY: Rest and take it easy for the first 24 hours.  A responsible adult is recommended to remain with you during that time.  It is normal to feel sleepy.  We encourage you to not do anything that requires balance, judgment or coordination.    MILD FLU-LIKE SYMPTOMS ARE NORMAL. YOU MAY EXPERIENCE GENERALIZED MUSCLE ACHES, THROAT IRRITATION, HEADACHE AND/OR SOME NAUSEA.    FOR 24 HOURS DO NOT:  Drive, operate machinery or run household appliances.  Drink beer or alcoholic beverages.   Make important decisions or sign legal documents.    SPECIAL INSTRUCTIONS:   Non-Weight Bearing Right Lower Extremity   Ice and elevate  Stay ahead of pain   Keep splint clean and dry  Aspirin 81mg two times a day for clot prevention    DIET: To avoid nausea, slowly advance diet as tolerated, avoiding spicy or greasy foods for the first day.  Add more substantial food to your diet according to your physician's instructions.  INCREASE FLUIDS AND FIBER TO AVOID CONSTIPATION.    SURGICAL DRESSING/BATHING: Keep your dressing clean and dry until your follow up appointment    FOLLOW-UP APPOINTMENT:  A follow-up appointment should be arranged with your doctor in 1-2 weeks; call to schedule.    You should CALL YOUR PHYSICIAN if you develop:  Fever greater than 101 degrees F.  Pain not relieved by medication, or persistent nausea or vomiting.  Excessive bleeding (blood soaking through dressing) or unexpected drainage from the wound.  Extreme redness or swelling around the incision site, drainage of pus or foul smelling drainage.  Inability to urinate or empty your bladder within 8 hours.  Problems with breathing or chest pain.    You should call 911 if you develop problems with breathing or chest pain.  If you are unable to contact your doctor or surgical center, you should go to the nearest emergency room or urgent care center.  Physician's telephone #: [phone]    If any questions arise, call your doctor.  If your doctor is not available,  please feel free to call the Surgical Center at [phone].     A registered nurse may call you a few days after your surgery to see how you are doing after your procedure.    MEDICATIONS: Resume taking daily medication.  Take prescribed pain medication with food.  If no medication is prescribed, you may take non-aspirin pain medication if needed.  PAIN MEDICATION CAN BE VERY CONSTIPATING.  Take a stool softener or laxative such as senokot, pericolace, or milk of magnesia if needed.    Prescription given in NIC folder.    If your physician has prescribed pain medication that includes Acetaminophen (Tylenol), do not take additional Acetaminophen (Tylenol) while taking the prescribed medication.    Depression / Suicide Risk    As you are discharged from this [facility] facility, it is important to learn how to keep safe from harming yourself.    Recognize the warning signs:  · Abrupt changes in personality, positive or negative- including increase in energy   · Giving away possessions  · Change in eating patterns- significant weight changes-  positive or negative  · Change in sleeping patterns- unable to sleep or sleeping all the time   · Unwillingness or inability to communicate  · Depression  · Unusual sadness, discouragement and loneliness  · Talk of wanting to die  · Neglect of personal appearance   · Rebelliousness- reckless behavior  · Withdrawal from people/activities they love  · Confusion- inability to concentrate     If you or a loved one observes any of these behaviors or has concerns about self-harm, here's what you can do:  · Talk about it- your feelings and reasons for harming yourself  · Remove any means that you might use to hurt yourself (examples: pills, rope, extension cords, firearm)  · Get professional help from the community (Mental Health, Substance Abuse, psychological counseling)  · Do not be alone:Call your Safe Contact- someone whom you trust who will be there for you.  · Call your  local CRISIS HOTLINE 938-9550 or 368-775-5812  · Call your local Children's Mobile Crisis Response Team [place] (283) 795-8746 or www.[employer]  · Call the toll free National Suicide Prevention Hotlines   · National Suicide Prevention Lifeline 929-920-YJMR (6439)  · National Enplug Line Network 800-SUICIDE (839-6609)

## 2022-03-15 NOTE — LETTER
March 7, 2022    Patient Name: Tamy Hines  Surgeon Name: Erwin Smith M.D.  Surgery Facility: [facility] ([address] [facility])  Surgery Date: 3/15/2022    The time of your surgery is not final and may change up to and until the day of your surgery. You will be contacted 24-48 hours prior to your surgery date with your check-in and surgery time.    If you will not be at one of the below numbers please call/text the surgery scheduler at [phone]  Preferred Phone: [phone]    BEFORE YOUR SURGERY  Pre Registration and/or Lab Work must be done within and no earlier than 28 days prior to your surgery date. Please call [facility] at [phone] for an appointment as soon as possible.     COVID test required 4-7 days prior to surgery, failure to do so can result in a cancellation.    The following locations offer COVID testing:    Approved facilities for COVID testing, if scheduled at [facility]:  · [facility] from 7:30am-3:30pm at [address] [place], [place]  · [facility] [phone] (Please call for an appointment)  · Your local pharmacy    Not scheduled at [facility] contact the scheduled facility for approved testing facilities.    Pre op Appointment:  Instructions: Bring a list of all medications you are taking including the dosing and frequency.    Please refrain from smoking any substance after midnight prior to surgery. Smoking may interfere with the anesthetic and frequently produces nausea during the recovery period.    Continue taking all lifesaving medications. Including the morning of your surgery with small sip of water.    Please read the MEDICATION INSTRUCTIONS below completely.    DAY OF YOUR SURGERY  Nothing to eat or drink after midnight     Please arrive at the hospital/surgery center at the check-in time provided.     An adult will need to bring you and take you home after your surgery.     AFTER YOUR SURGERY  Post  op Appointment:   Date: 03/28/22   Time: 10:00AM   With: Erwin Smith M.D.   Location: [address] [place] [address]    TIME OFF WORK  FMLA or Disability forms can be faxed directly to: [phone] or you may drop them off at [address] [place] [address]. Our office charges a $35.00 fee per form. Forms will be completed within 10 business days of drop off and payment received. For the status of your forms you may contact our disability office directly [phone].    MEDICATION INSTRUCTIONS  The following medications should be stopped a minimum of 10 days prior to surgery:  All over the counter, Supplements & Herbal medications    Anorectics: Phentermine (Adipex-P, Lomaira and Suprenza), Phentermine-topiramate (Qsymia), Bupropion-naltrexone (Contrave)    Opiod Partial Agonists/Opioid Antagonists: Buprenorphine (Subocone, Belbuca, Butrans, Probuphine Implant, Sublocade), Naltrexone (ReVia, Vivitrol), Naloxone    Amphetamines: Dextroamphetamine/Amphetamine (Adderall, Mydayis), Methylphenidate Hydrochloride (Concerta, Metadate, Methylin, Ritalin)    The following medications should be stopped 5 days prior to surgery:  Blood Thinners: Any Aspirin, Aspirin products, anti-inflammatories such as ibuprofen and any blood thinners such as Coumadin and Plavix. Please consult your prescribing physician if you are on life saving blood thinners, in regards to when to stop medications prior to surgery.     The following medications should be stopped a minimum of 3 days prior to surgery:  PDE-5 inhibitors: Sildenafil (Viagra), Tadalafil (Cialis), Vardenafil (Levitra), Avanafil (Stendra)    MAO Inhibitors: Rasagiline (Azilect), Selegiline (Eldepryl, Emsam, Selapar), Isocarboxazid (Marplan), Phenelzine (Nardil)

## 2022-03-15 NOTE — ANESTHESIA PROCEDURE NOTES
Peripheral Block    Date/Time: 3/15/2022 7:59 AM  Performed by: Jesus Foy M.D.  Authorized by: Jesus Foy M.D.     Patient Location:  OR  Start Time:  3/15/2022 7:59 AM  End Time:  3/15/2022 8:02 AM  Reason for Block: at surgeon's request and post-op pain management ONLY    patient identified, IV checked, site marked, risks and benefits discussed, surgical consent, monitors and equipment checked, pre-op evaluation and timeout performed    Patient Position:  Supine  Prep: ChloraPrep    Monitoring:  Heart rate, continuous pulse ox and cardiac monitor  Block Region:  Lower Extremity  Lower Extremity - Block Type:  Selective FEMORAL nerve block at the Adductor Canal    Laterality:  Right  Procedures: ultrasound guided  Image captured, interpreted and electronically stored.  Local Infiltration:  Lidocaine  Strength:  1 %  Dose:  3 ml  Block Type:  Single-shot  Needle Length:  100mm  Needle Gauge:  21 G  Needle Localization:  Ultrasound guidance  Injection Assessment:  Negative aspiration for heme, no paresthesia on injection, incremental injection and local visualized surrounding nerve on ultrasound  Evidence of intravascular injection: No

## 2022-03-24 ENCOUNTER — NON-PROVIDER VISIT (OUTPATIENT)
Dept: MEDICAL GROUP | Facility: PHYSICIAN GROUP | Age: 22
End: 2022-03-24
Payer: COMMERCIAL

## 2022-03-24 DIAGNOSIS — Z30.42 ENCOUNTER FOR SURVEILLANCE OF INJECTABLE CONTRACEPTIVE: ICD-10-CM

## 2022-03-24 LAB
INT CON NEG: NORMAL
INT CON POS: NORMAL
POC URINE PREGNANCY TEST: NORMAL

## 2022-03-24 PROCEDURE — 96372 THER/PROPH/DIAG INJ SC/IM: CPT | Performed by: NURSE PRACTITIONER

## 2022-03-24 PROCEDURE — 81025 URINE PREGNANCY TEST: CPT | Performed by: FAMILY MEDICINE

## 2022-03-24 RX ORDER — MEDROXYPROGESTERONE ACETATE 150 MG/ML
150 INJECTION, SUSPENSION INTRAMUSCULAR ONCE
Status: COMPLETED | OUTPATIENT
Start: 2022-03-24 | End: 2022-03-24

## 2022-03-24 RX ADMIN — MEDROXYPROGESTERONE ACETATE 150 MG: 150 INJECTION, SUSPENSION INTRAMUSCULAR at 17:47

## 2022-03-25 NOTE — NON-PROVIDER
Alexandra Hines is a 21 y.o. here for a Depo Provera Injection.     Date of last Depo Provera Injection: 12/08/2021  Current date within therapeutic range?: No   Urine pregnancy test done (needed if out of date range): yes--neg  Date of last office visit:[id number]  Date of last pap (if > 21 years old)/ GYN exam: n/a  Dx: Contraceptive use    Patient tolerated injection and no adverse effects were observed or reported: Yes    # of Administrations remaining in MAR: 0  Next injection due between 06/09/2022 and 06/23/2022.

## 2022-05-02 ENCOUNTER — HOSPITAL ENCOUNTER (OUTPATIENT)
Dept: RADIOLOGY | Facility: MEDICAL CENTER | Age: 22
End: 2022-05-02
Attending: NURSE PRACTITIONER
Payer: COMMERCIAL

## 2022-05-02 DIAGNOSIS — S82.831K: ICD-10-CM

## 2022-05-02 DIAGNOSIS — M79.661 RIGHT CALF PAIN: ICD-10-CM

## 2022-05-02 PROCEDURE — 93971 EXTREMITY STUDY: CPT | Mod: RT

## 2022-06-15 ENCOUNTER — OFFICE VISIT (OUTPATIENT)
Dept: URGENT CARE | Facility: PHYSICIAN GROUP | Age: 22
End: 2022-06-15
Payer: COMMERCIAL

## 2022-06-15 ENCOUNTER — HOSPITAL ENCOUNTER (OUTPATIENT)
Facility: MEDICAL CENTER | Age: 22
End: 2022-06-15
Attending: FAMILY MEDICINE
Payer: COMMERCIAL

## 2022-06-15 VITALS
HEIGHT: 67 IN | WEIGHT: 213 LBS | TEMPERATURE: 97.4 F | RESPIRATION RATE: 16 BRPM | HEART RATE: 90 BPM | OXYGEN SATURATION: 98 % | BODY MASS INDEX: 33.43 KG/M2 | SYSTOLIC BLOOD PRESSURE: 126 MMHG | DIASTOLIC BLOOD PRESSURE: 68 MMHG

## 2022-06-15 DIAGNOSIS — R39.15 URINARY URGENCY: ICD-10-CM

## 2022-06-15 DIAGNOSIS — R05.9 COUGH: ICD-10-CM

## 2022-06-15 DIAGNOSIS — M79.671 RIGHT FOOT PAIN: ICD-10-CM

## 2022-06-15 DIAGNOSIS — N30.00 ACUTE CYSTITIS WITHOUT HEMATURIA: ICD-10-CM

## 2022-06-15 LAB
EXTERNAL QUALITY CONTROL: NORMAL
INT CON NEG: NORMAL
INT CON POS: NORMAL
POC URINE PREGNANCY TEST: NORMAL
SARS-COV+SARS-COV-2 AG RESP QL IA.RAPID: NEGATIVE

## 2022-06-15 PROCEDURE — 87086 URINE CULTURE/COLONY COUNT: CPT

## 2022-06-15 PROCEDURE — 87070 CULTURE OTHR SPECIMN AEROBIC: CPT

## 2022-06-15 PROCEDURE — 99214 OFFICE O/P EST MOD 30 MIN: CPT | Performed by: FAMILY MEDICINE

## 2022-06-15 PROCEDURE — 87426 SARSCOV CORONAVIRUS AG IA: CPT | Performed by: FAMILY MEDICINE

## 2022-06-15 PROCEDURE — 81025 URINE PREGNANCY TEST: CPT | Performed by: FAMILY MEDICINE

## 2022-06-15 ASSESSMENT — FIBROSIS 4 INDEX: FIB4 SCORE: 0.28

## 2022-06-15 NOTE — PROGRESS NOTES
female who presents with DYSURIA             Urinary symptoms    This is a new problem. The current episode started in the past 2  days. The problem occurs constantly. The problem has been unchanged. Associated symptoms include increased urinary frequency      Denies dysuria or hematuria.    Pertinent negatives include no change in bowel habit, chest pain, chills, visual change, vomiting or weakness. Nothing aggravates the symptoms. She has tried nothing for the symptoms.       LMP -  N/a - on depo-provera        #2. Dry cough, sore throat x 2 d.  + recent covid exposure.   Denies fever, chills, n/v.   No diarrhea.   Denies abd pain.        #3.  C/o rt foot pain due to bunion x 1 wk. Pain worse with wtbearing.       Social History     Socioeconomic History   • Marital status: Single     Spouse name: Not on file   • Number of children: Not on file   • Years of education: Not on file   • Highest education level: Not on file   Occupational History   • Not on file   Tobacco Use   • Smoking status: Never Smoker   • Smokeless tobacco: Never Used   Vaping Use   • Vaping Use: Never used   Substance and Sexual Activity   • Alcohol use: Yes     Alcohol/week: 0.0 oz     Comment: reports 1/month   • Drug use: Not Currently   • Sexual activity: Yes   Other Topics Concern   • Behavioral problems Not Asked   • Interpersonal relationships Not Asked   • Sad or not enjoying activities Not Asked   • Suicidal thoughts Not Asked   • Poor school performance Not Asked   • Reading difficulties Not Asked   • Speech difficulties Not Asked   • Writing difficulties Not Asked   • Inadequate sleep Not Asked   • Excessive TV viewing Not Asked   • Excessive video game use Not Asked   • Inadequate exercise Not Asked   • Sports related Not Asked   • Poor diet Not Asked   • Family concerns for drug/alcohol abuse Not Asked   • Poor oral hygiene Not Asked   • Bike safety Not Asked   • Family concerns vehicle safety Not Asked   Social History Narrative  "   Lives with mother.  Has sister.  4th grade.      Social Determinants of Health     Financial Resource Strain: Not on file   Food Insecurity: Not on file   Transportation Needs: Not on file   Physical Activity: Not on file   Stress: Not on file   Social Connections: Not on file   Intimate Partner Violence: Not on file   Housing Stability: Not on file          Past Medical History:   Diagnosis Date   • Allergic rhinitis    • ASTHMA 8/15/2013    inhaler as needed   • Bowel habit changes     diarrhea   • Depression    • Gynecological disorder     severe cramps and cysts   • History of COVID-19     tested (+) 01-, States symptoms resolved by 01-   • IBS (irritable bowel syndrome)    • Menarche 8/15/2013    Started at 10. Fairly regular.   • Nausea, vomiting and diarrhea 11/19/2014   • Pain 03/09/2022    right ankle, 5-6/10   • Snoring     no sleep study         Current Outpatient Medications on File Prior to Visit   Medication Sig Dispense Refill   • acetaminophen (TYLENOL) 500 MG Tab Take 1,000 mg by mouth every evening as needed. Indications: Pain     • Ibuprofen 200 MG Cap Take 600 mg by mouth every day.     • medroxyPROGESTERone Acetate (DEPO-PROVERA IM) Inject 1 Each into the shoulder, thigh, or buttocks every 3 months. Every 3 months     • MYRBETRIQ 50 MG TABLET SR 24 HR Take 1 Tablet by mouth at bedtime. Indications: Frequent Urination       No current facility-administered medications on file prior to visit.       Review of Systems   Constitutional: Negative for fevers or chills.   HENT: + for cough, congestion, sore throat  Cardiovascular: Negative for chest pain.   Gastrointestinal: Positive for nausea. Negative for vomiting and change in bowel habit.   Neurological: Negative for weakness, dizziness.          Objective:   /68   Pulse 90   Temp 36.3 °C (97.4 °F) (Temporal)   Resp 16   Ht 1.702 m (5' 7\")   Wt 96.6 kg (213 lb)   SpO2 98%       Physical Exam  PAUL SAMUEL, " EOMI  Throat - clear.   No edema, no exudate  Neuro - alert and oriented x3. CN 2-12 grossly intact.  Lungs - CTA. No wheezes, rhonchi or rales.  Heart - regular rate and rhythm without murmur.  Abdomen - soft and non-tender, bowel sounds active x4.   No flank pain  Musculoskeletal - No lower extremity edema noted.    Rt foot - there is TTP over first MTP joint.    Appears to be some bony overgrowth, but no erythema     Lab Results   Component Value Date/Time    POCCOLOR yellow 12/11/2020 01:10 PM    POCAPPEAR clear 12/11/2020 01:10 PM    POCLEUKEST trace 12/11/2020 01:10 PM    POCNITRITE neg 12/11/2020 01:10 PM    POCUROBILIGE 0.2 12/11/2020 01:10 PM    POCPROTEIN neg 12/11/2020 01:10 PM    POCURPH 5.5 12/11/2020 01:10 PM    POCBLOOD trace 12/11/2020 01:10 PM    POCSPGRV 1.030 12/11/2020 01:10 PM    POCKETONES neg 12/11/2020 01:10 PM    POCBILIRUBIN neg 12/11/2020 01:10 PM    POCGLUCUA neg 12/11/2020 01:10 PM         hcg negative.       Assessment/Plan:        1. Urinary urgency  UA was unremarkable.   Urine sent for culture.     She gets recurrent UTIs and states that abx is not always effective.   Consider possible interstitial cystitis?    - Referral to Urology    2. Right foot pain   likely secondary to bunion  - Referral to Podiatry    3. Cough  Likely viral     - CULTURE THROAT; Future  - POCT SARS-COV Antigen ELIER (Symptomatic only)       Follow up in one week if no improvement, sooner if symptoms worsen.           My total time spent caring for the patient on the day of the encounter was 30  minutes.   This does not include time spent on separately billable procedures/tests.

## 2022-06-17 LAB
BACTERIA SPEC RESP CULT: NORMAL
SIGNIFICANT IND 70042: NORMAL
SITE SITE: NORMAL
SOURCE SOURCE: NORMAL

## 2022-06-18 LAB
BACTERIA UR CULT: NORMAL
SIGNIFICANT IND 70042: NORMAL
SITE SITE: NORMAL
SOURCE SOURCE: NORMAL

## 2022-08-16 ENCOUNTER — HOSPITAL ENCOUNTER (OUTPATIENT)
Dept: RADIOLOGY | Facility: MEDICAL CENTER | Age: 22
End: 2022-08-16
Attending: PODIATRIST
Payer: COMMERCIAL

## 2022-08-16 DIAGNOSIS — M25.571 RIGHT ANKLE PAIN, UNSPECIFIED CHRONICITY: ICD-10-CM

## 2022-08-16 PROCEDURE — 73700 CT LOWER EXTREMITY W/O DYE: CPT | Mod: RT

## 2022-09-13 ENCOUNTER — HOSPITAL ENCOUNTER (EMERGENCY)
Facility: MEDICAL CENTER | Age: 22
End: 2022-09-14
Attending: EMERGENCY MEDICINE
Payer: COMMERCIAL

## 2022-09-13 DIAGNOSIS — J45.41 MODERATE PERSISTENT ASTHMA WITH ACUTE EXACERBATION: ICD-10-CM

## 2022-09-13 PROCEDURE — 96374 THER/PROPH/DIAG INJ IV PUSH: CPT

## 2022-09-13 PROCEDURE — 94640 AIRWAY INHALATION TREATMENT: CPT

## 2022-09-13 PROCEDURE — 700101 HCHG RX REV CODE 250: Performed by: EMERGENCY MEDICINE

## 2022-09-13 PROCEDURE — 99284 EMERGENCY DEPT VISIT MOD MDM: CPT

## 2022-09-13 PROCEDURE — 700111 HCHG RX REV CODE 636 W/ 250 OVERRIDE (IP): Performed by: EMERGENCY MEDICINE

## 2022-09-13 RX ORDER — METHYLPREDNISOLONE SODIUM SUCCINATE 40 MG/ML
40 INJECTION, POWDER, LYOPHILIZED, FOR SOLUTION INTRAMUSCULAR; INTRAVENOUS ONCE
Status: COMPLETED | OUTPATIENT
Start: 2022-09-13 | End: 2022-09-13

## 2022-09-13 RX ORDER — IPRATROPIUM BROMIDE AND ALBUTEROL SULFATE 2.5; .5 MG/3ML; MG/3ML
3 SOLUTION RESPIRATORY (INHALATION)
Status: COMPLETED | OUTPATIENT
Start: 2022-09-13 | End: 2022-09-13

## 2022-09-13 RX ADMIN — IPRATROPIUM BROMIDE AND ALBUTEROL SULFATE 3 ML: .5; 2.5 SOLUTION RESPIRATORY (INHALATION) at 23:26

## 2022-09-13 RX ADMIN — METHYLPREDNISOLONE SODIUM SUCCINATE 40 MG: 40 INJECTION, POWDER, FOR SOLUTION INTRAMUSCULAR; INTRAVENOUS at 23:50

## 2022-09-13 ASSESSMENT — FIBROSIS 4 INDEX: FIB4 SCORE: 0.28

## 2022-09-14 VITALS
BODY MASS INDEX: 35.54 KG/M2 | SYSTOLIC BLOOD PRESSURE: 160 MMHG | TEMPERATURE: 98 F | DIASTOLIC BLOOD PRESSURE: 90 MMHG | RESPIRATION RATE: 20 BRPM | HEART RATE: 100 BPM | HEIGHT: 67 IN | WEIGHT: 226.41 LBS | OXYGEN SATURATION: 92 %

## 2022-09-14 RX ORDER — PREDNISONE 20 MG/1
40 TABLET ORAL DAILY
Qty: 8 TABLET | Refills: 0 | Status: SHIPPED | OUTPATIENT
Start: 2022-09-14 | End: 2022-09-18

## 2022-09-14 NOTE — ED NOTES
Patient ambulated from lobby to room independently with steady gait accompanied by mother.    Chart up for ERP.

## 2022-09-14 NOTE — ED PROVIDER NOTES
ED Provider Note    Scribed for Mejia Leal M.D. by King Tariq. 9/13/2022  11:10 PM    Primary care provider: Anabela Chong M.D.  Means of arrival: Walk in  History obtained from: Patient  History limited by: None    CHIEF COMPLAINT  Chief Complaint   Patient presents with    Asthma    Shortness of Breath     Pt reports using albuterol without relief. Last took 30 minutes ago. Starting Sunday.       HPI  Pema Hines is a 21 y.o. female with a history of asthma who presents to the Emergency Department for evaluation of shortness of breath onset 3 days ago. Patient states she has had several asthma attacks over the past few days. Patient denies any fevers or coughs. Patient states she has used 2 puffs of albuterol this morning, 2 buffs 2 hours ago, and 2 puffs prior to evaluation with minimal alleviation to symptoms. She denies having a spacer or nebulizer.      REVIEW OF SYSTEMS  Pertinent positives include shortness of breath.   E      PAST MEDICAL HISTORY   has a past medical history of Allergic rhinitis, ASTHMA (8/15/2013), Bowel habit changes, Depression, Gynecological disorder, History of COVID-19, IBS (irritable bowel syndrome), Menarche (8/15/2013), Nausea, vomiting and diarrhea (11/19/2014), Pain (03/09/2022), and Snoring.    SURGICAL HISTORY   has a past surgical history that includes colonoscopy (10/2015); endoscopy (08/2015); melanie by laparoscopy (N/A, 12/17/2015); dental extraction(s); open tx distal tibiofibular joint disruption (Right, 3/15/2022); and unlisted proc, arthroscopy (Right, 3/15/2022).    SOCIAL HISTORY  Social History     Tobacco Use    Smoking status: Never    Smokeless tobacco: Never   Vaping Use    Vaping Use: Never used   Substance Use Topics    Alcohol use: Yes     Alcohol/week: 0.0 oz     Comment: reports 1/month    Drug use: Not Currently      Social History     Substance and Sexual Activity   Drug Use Not Currently       FAMILY HISTORY  Family History  "  Adopted: Yes   Problem Relation Age of Onset    Other Mother         adopted    Other Father         adopted       CURRENT MEDICATIONS  Home Medications       Reviewed by Abigail Rodríguez R.N. (Registered Nurse) on 09/13/22 at 2205  Med List Status: Not Addressed     Medication Last Dose Status   acetaminophen (TYLENOL) 500 MG Tab  Active   Ibuprofen 200 MG Cap  Active   medroxyPROGESTERone Acetate (DEPO-PROVERA IM)  Active   MYRBETRIQ 50 MG TABLET SR 24 HR  Active                    ALLERGIES  No Known Allergies    PHYSICAL EXAM  VITAL SIGNS: /80   Pulse 99   Temp 36.5 °C (97.7 °F) (Temporal)   Resp 20   Ht 1.702 m (5' 7\")   Wt 103 kg (226 lb 6.6 oz)   SpO2 95%   BMI 35.46 kg/m²     Constitutional: Well developed, Well nourished, mild distress, Non-toxic appearance.   HENT: Normocephalic, Atraumatic.  Oropharynx moist.   Eyes: PERRL, EOMI, Conjunctiva normal, No discharge.   CV: Good pulses  Thorax & Lungs: Decreased breath sounds throughout. Scant and expiratory wheezes. Mild respiratory distress.  Skin: Warm, Dry, No erythema, No rash.    Musculoskeletal: No major deformities noted.   Neurologic: Awake, alert. Moves all extremities spontaneously.  Psychiatric: Affect normal, Mood normal.      LABS  Results for orders placed or performed during the hospital encounter of 06/15/22   URINE CULTURE(NEW)    Specimen: Urine   Result Value Ref Range    Significant Indicator NEG     Source UR     Site -     Culture Result Usual urogenital laina 10-50,000 cfu/mL    CULTURE THROAT    Specimen: Throat   Result Value Ref Range    Significant Indicator NEG     Source THRT     Site -     Culture Result       Light growth usual upper respiratory laina  No group A beta Streptococcus isolated.           RADIOLOGY  No orders to display     The radiologist's interpretation of all radiological studies have been reviewed by me.    COURSE & MEDICAL DECISION MAKING  Nursing notes, VS, PMSFHx reviewed in chart.    11:10 " PM - Patient seen and examined at bedside. Patient will be treated with albuterol and prednisone. Informed her of plan of care. Patient verbalizes understanding and agreement to this plan of care.       Decision Making:  Patient coming in with asthma exacerbation, coughing, give the patient a breathing treatment, prednisone.  The patient is thinking somewhat worse secondary to the coughing however she is moving better air.  O2 saturations are appropriate no indication for chest x-ray, will give the patient a prescription for prednisone, have the patient return with worsening symptoms.    The patient will return for new or worsening symptoms and is stable at the time of discharge.    The patient is referred to a primary physician for blood pressure management, diabetic screening, and for all other preventative health concerns.    DISPOSITION:  Patient will be discharged home in stable condition.    FOLLOW UP:  [facility], Emergency Dept  [address] [place] [address] [phone]    If symptoms worsen    Anabela Chong M.D.  [address] [place] [address] [phone]          OUTPATIENT MEDICATIONS:  Discharge Medication List as of 9/14/2022 12:21 AM        START taking these medications    Details   predniSONE (DELTASONE) 20 MG Tab Take 2 Tablets by mouth every day for 4 days., Disp-8 Tablet, R-0, Normal                 FINAL IMPRESSION  1. Moderate persistent asthma with acute exacerbation          King DARDEN), am scribing for, and in the presence of, Mejia Leal M.D..    Electronically signed by: King Fernandez), 9/13/2022    Mejia DARDEN M.D. personally performed the services described in this documentation, as scribed by King Tariq in my presence, and it is both accurate and complete.    The note accurately reflects work and decisions made by me.  Mejia Leal M.D.  9/14/2022  12:32 AM

## 2022-09-14 NOTE — ED NOTES
Patient discharged from [facility] ED to home with mother. Discharge teaching completed at bedside and patient signature obtained. All questions and concerns addressed. PIV removed. All pt belongings with pt at time of discharge.

## 2022-09-14 NOTE — ED TRIAGE NOTES
"Chief Complaint   Patient presents with    Asthma    Shortness of Breath     Pt reports using albuterol without relief. Last took 30 minutes ago. Starting Sunday.           BP (!) 145/87   Pulse (!) 110   Temp 36.4 °C (97.6 °F) (Temporal)   Resp (!) 22   Ht 1.702 m (5' 7\")   Wt 103 kg (226 lb 6.6 oz)   SpO2 96% Comment: RA  BMI 35.46 kg/m²     "

## 2022-09-14 NOTE — ED NOTES
Assumed report and patient care from Maria L DELGADO. Patient is resting comfortably in bed with no signs of labored breathing or restlessness. POC discussed. No questions or concerns at this time. Whiteboard updated. Pending PIV and medication administration. Safety measures in place, call light within reach.

## 2022-10-11 ENCOUNTER — OFFICE VISIT (OUTPATIENT)
Dept: URGENT CARE | Facility: PHYSICIAN GROUP | Age: 22
End: 2022-10-11
Payer: COMMERCIAL

## 2022-10-11 VITALS
BODY MASS INDEX: 35.63 KG/M2 | HEIGHT: 67 IN | WEIGHT: 227 LBS | TEMPERATURE: 97.1 F | DIASTOLIC BLOOD PRESSURE: 76 MMHG | RESPIRATION RATE: 20 BRPM | OXYGEN SATURATION: 95 % | SYSTOLIC BLOOD PRESSURE: 122 MMHG | HEART RATE: 85 BPM

## 2022-10-11 DIAGNOSIS — J02.9 PHARYNGITIS, UNSPECIFIED ETIOLOGY: ICD-10-CM

## 2022-10-11 LAB
EXTERNAL QUALITY CONTROL: NORMAL
INT CON NEG: NORMAL
INT CON NEG: NORMAL
INT CON POS: NORMAL
INT CON POS: NORMAL
S PYO AG THROAT QL: POSITIVE
SARS-COV+SARS-COV-2 AG RESP QL IA.RAPID: NEGATIVE

## 2022-10-11 PROCEDURE — 99213 OFFICE O/P EST LOW 20 MIN: CPT | Performed by: NURSE PRACTITIONER

## 2022-10-11 PROCEDURE — 87426 SARSCOV CORONAVIRUS AG IA: CPT | Performed by: NURSE PRACTITIONER

## 2022-10-11 PROCEDURE — 87880 STREP A ASSAY W/OPTIC: CPT | Performed by: NURSE PRACTITIONER

## 2022-10-11 RX ORDER — AMOXICILLIN 500 MG/1
500 CAPSULE ORAL 2 TIMES DAILY
Qty: 20 CAPSULE | Refills: 0 | Status: SHIPPED | OUTPATIENT
Start: 2022-10-11 | End: 2022-10-14

## 2022-10-11 ASSESSMENT — VISUAL ACUITY: OU: 1

## 2022-10-11 ASSESSMENT — ENCOUNTER SYMPTOMS
HEADACHES: 1
SORE THROAT: 1
MYALGIAS: 1

## 2022-10-11 ASSESSMENT — FIBROSIS 4 INDEX: FIB4 SCORE: 0.28

## 2022-10-11 NOTE — PROGRESS NOTES
Subjective:     Pema Hines is a 21 y.o. female who presents for Pharyngitis (HA, sleeping all day nasal congestion. X 2 days  )       Pharyngitis   This is a new problem. The problem has been gradually worsening. Associated symptoms include congestion and headaches.     Hx of strep; similar sx. Was sharing drinks with a friend.    Review of Systems   Constitutional:  Positive for malaise/fatigue.   HENT:  Positive for congestion and sore throat.    Musculoskeletal:  Positive for myalgias.   Neurological:  Positive for headaches.   All other systems reviewed and are negative.    Refer to HPI for additional details.    During this visit, appropriate PPE was worn, hand hygiene was performed, and the patient and any visitors were masked.    PMH:  has a past medical history of Allergic rhinitis, ASTHMA (8/15/2013), Bowel habit changes, Depression, Gynecological disorder, History of COVID-19, IBS (irritable bowel syndrome), Menarche (8/15/2013), Nausea, vomiting and diarrhea (11/19/2014), Pain (03/09/2022), and Snoring.    MEDS:   Current Outpatient Medications:     amoxicillin (AMOXIL) 500 MG Cap, Take 1 Capsule by mouth 2 times a day for 10 days., Disp: 20 Capsule, Rfl: 0    acetaminophen (TYLENOL) 500 MG Tab, Take 1,000 mg by mouth every evening as needed. Indications: Pain, Disp: , Rfl:     Ibuprofen 200 MG Cap, Take 600 mg by mouth every day., Disp: , Rfl:     ALLERGIES: No Known Allergies  SURGHX:   Past Surgical History:   Procedure Laterality Date    PB OPEN TX DISTAL TIBIOFIBULAR JOINT DISRUPTION Right 3/15/2022    Procedure: RIGHT OPEN REDUCTION INTERNAL FIXATION FIBULA NONUNION, SYNDESMOSIS REPAIR, ANKLE ARTHROSCOPY;  Surgeon: Erwin Smith M.D.;  Location: SURGERY [facility];  Service: Orthopedics    SC UNLISTED PROC, ARTHROSCOPY Right 3/15/2022    Procedure: ARTHROSCOPY, ANKLE;  Surgeon: Erwin Smith M.D.;  Location: SURGERY [facility];  Service: Orthopedics    SOHAM BY LAPAROSCOPY  "N/A 12/17/2015    Procedure: SOHAM BY LAPAROSCOPY ;  Surgeon: Yuliet Dale M.D.;  Location: SURGERY [facility];  Service:     COLONOSCOPY  10/2015    ENDOSCOPY  08/2015    DENTAL EXTRACTION(S)      wisdom teeth     SOCHX:  reports that she has never smoked. She has never used smokeless tobacco. She reports current alcohol use. She reports that she does not currently use drugs.    FH: Per HPI as applicable/pertinent.      Objective:     /76   Pulse 85   Temp 36.2 °C (97.1 °F) (Temporal)   Resp 20   Ht 1.702 m (5' 7\")   Wt 103 kg (227 lb)   SpO2 95%   BMI 35.55 kg/m²     Physical Exam  Nursing note reviewed.   Constitutional:       General: She is not in acute distress.     Appearance: She is well-developed. She is not ill-appearing or toxic-appearing.   HENT:      Mouth/Throat:      Mouth: Mucous membranes are moist.      Pharynx: Uvula midline. Pharyngeal swelling and posterior oropharyngeal erythema present.   Eyes:      General: Vision grossly intact.   Cardiovascular:      Rate and Rhythm: Normal rate.   Pulmonary:      Effort: Pulmonary effort is normal. No respiratory distress.   Musculoskeletal:         General: No deformity. Normal range of motion.   Skin:     Coloration: Skin is not pale.   Neurological:      Mental Status: She is alert and oriented to person, place, and time.      Motor: No weakness.   Psychiatric:         Behavior: Behavior normal. Behavior is cooperative.     POCT Covid: negative    Rapid Strep A swab: positive      Assessment/Plan:     1. Pharyngitis, unspecified etiology  - POCT Rapid Strep A  - POCT SARS-COV Antigen ELIER Manual Result  - amoxicillin (AMOXIL) 500 MG Cap; Take 1 Capsule by mouth 2 times a day for 10 days.  Dispense: 20 Capsule; Refill: 0    Rx as above sent electronically.    Rest, fluids, OTC ibuprofen/APAP PRN. Gargles.    Advised of contagious nature of strep and to avoid close oral contact. Avoid sharing drinks. Change toothbrush 2 days after " starting antibiotic. Perform frequent hand hygiene.     Differential diagnosis, natural history, supportive care, over-the-counter symptom management per 's instructions, close monitoring, and indications for immediate follow-up discussed.     All questions answered. Patient agrees with the plan of care.    Discharge summary provided.    Work note provided.

## 2022-10-11 NOTE — LETTER
October 11, 2022         Patient: Pema Hines   YOB: 2000   Date of Visit: 10/11/2022           To Whom it May Concern:    Pema Hines was seen in my clinic on 10/11/2022 due to illness. Due to medical necessity, please excuse patient from work 10/11 and 10/12.    If you have any questions or concerns, please don't hesitate to call.        Sincerely,           ROSHNI Suarez.  Electronically Signed

## 2022-10-14 ENCOUNTER — TELEPHONE (OUTPATIENT)
Dept: URGENT CARE | Facility: PHYSICIAN GROUP | Age: 22
End: 2022-10-14

## 2022-10-14 ENCOUNTER — OFFICE VISIT (OUTPATIENT)
Dept: URGENT CARE | Facility: PHYSICIAN GROUP | Age: 22
End: 2022-10-14
Payer: COMMERCIAL

## 2022-10-14 VITALS
TEMPERATURE: 98.3 F | OXYGEN SATURATION: 97 % | WEIGHT: 228 LBS | BODY MASS INDEX: 35.79 KG/M2 | RESPIRATION RATE: 14 BRPM | HEIGHT: 67 IN | DIASTOLIC BLOOD PRESSURE: 82 MMHG | HEART RATE: 76 BPM | SYSTOLIC BLOOD PRESSURE: 128 MMHG

## 2022-10-14 DIAGNOSIS — J02.0 ACUTE STREPTOCOCCAL PHARYNGITIS: ICD-10-CM

## 2022-10-14 PROCEDURE — 99214 OFFICE O/P EST MOD 30 MIN: CPT | Performed by: FAMILY MEDICINE

## 2022-10-14 RX ORDER — PREDNISONE 20 MG/1
TABLET ORAL
Qty: 14 TABLET | Refills: 0 | Status: SHIPPED | OUTPATIENT
Start: 2022-10-14 | End: 2023-01-23

## 2022-10-14 RX ORDER — AMOXICILLIN AND CLAVULANATE POTASSIUM 875; 125 MG/1; MG/1
TABLET, FILM COATED ORAL
Qty: 20 TABLET | Refills: 0 | Status: SHIPPED | OUTPATIENT
Start: 2022-10-14 | End: 2023-01-23

## 2022-10-14 ASSESSMENT — FIBROSIS 4 INDEX: FIB4 SCORE: 0.28

## 2022-10-14 NOTE — PROGRESS NOTES
Chief Complaint:    Chief Complaint   Patient presents with    Sore Throat     F/V Pt C/O worsening sore throat, ear pain, and congestion.       History of Present Illness:    Saw other provider on 10/11/22, had positive Rapid Strep test and negative Covid test, was rx'd Amoxil 500 mg BID x 10 days, visit reviewed. She is getting worse, despite taking Amoxil. Throat hurting more. Some nasal symptoms with clear mucus from nose. Has tender neck glands.      Past Medical History:    Past Medical History:   Diagnosis Date    Allergic rhinitis     ASTHMA 8/15/2013    inhaler as needed    Bowel habit changes     diarrhea    Depression     Gynecological disorder     severe cramps and cysts    History of COVID-19     tested (+) 01-, States symptoms resolved by 01-    IBS (irritable bowel syndrome)     Menarche 8/15/2013    Started at 10. Fairly regular.    Nausea, vomiting and diarrhea 11/19/2014    Pain 03/09/2022    right ankle, 5-6/10    Snoring     no sleep study     Past Surgical History:    Past Surgical History:   Procedure Laterality Date    PB OPEN TX DISTAL TIBIOFIBULAR JOINT DISRUPTION Right 3/15/2022    Procedure: RIGHT OPEN REDUCTION INTERNAL FIXATION FIBULA NONUNION, SYNDESMOSIS REPAIR, ANKLE ARTHROSCOPY;  Surgeon: Erwin Smith M.D.;  Location: SURGERY [facility];  Service: Orthopedics    [place] UNLISTED PROC, ARTHROSCOPY Right 3/15/2022    Procedure: ARTHROSCOPY, ANKLE;  Surgeon: Erwin Smith M.D.;  Location: SURGERY [facility];  Service: Orthopedics    SOHAM BY LAPAROSCOPY N/A 12/17/2015    Procedure: SOHAM BY LAPAROSCOPY ;  Surgeon: Yuliet Dale M.D.;  Location: [facility];  Service:     COLONOSCOPY  10/2015    ENDOSCOPY  08/2015    DENTAL EXTRACTION(S)      wisdom teeth     Social History:    Social History     Socioeconomic History    Marital status: Single     Spouse name: Not on file    Number of children: Not on file    Years of education: Not on file    Highest  education level: Not on file   Occupational History    Not on file   Tobacco Use    Smoking status: Never    Smokeless tobacco: Never   Vaping Use    Vaping Use: Never used   Substance and Sexual Activity    Alcohol use: Yes     Alcohol/week: 0.0 oz     Comment: reports 1/month    Drug use: Not Currently    Sexual activity: Yes   Other Topics Concern    Behavioral problems Not Asked    Interpersonal relationships Not Asked    Sad or not enjoying activities Not Asked    Suicidal thoughts Not Asked    Poor school performance Not Asked    Reading difficulties Not Asked    Speech difficulties Not Asked    Writing difficulties Not Asked    Inadequate sleep Not Asked    Excessive TV viewing Not Asked    Excessive video game use Not Asked    Inadequate exercise Not Asked    Sports related Not Asked    Poor diet Not Asked    Family concerns for drug/alcohol abuse Not Asked    Poor oral hygiene Not Asked    Bike safety Not Asked    Family concerns vehicle safety Not Asked   Social History Narrative    Lives with mother.  Has sister.  4th grade.      Social Determinants of Health     Financial Resource Strain: Not on file   Food Insecurity: Not on file   Transportation Needs: Not on file   Physical Activity: Not on file   Stress: Not on file   Social Connections: Not on file   Intimate Partner Violence: Not on file   Housing Stability: Not on file     Family History:    Family History   Adopted: Yes   Problem Relation Age of Onset    Other Mother         adopted    Other Father         adopted     Medications:    Current Outpatient Medications on File Prior to Visit   Medication Sig Dispense Refill    amoxicillin (AMOXIL) 500 MG Cap Take 1 Capsule by mouth 2 times a day for 10 days. 20 Capsule 0    acetaminophen (TYLENOL) 500 MG Tab Take 1,000 mg by mouth every evening as needed. Indications: Pain      Ibuprofen 200 MG Cap Take 600 mg by mouth every day.       No current facility-administered medications on file prior to  "visit.     Allergies:    No Known Allergies      Vitals:    Vitals:    10/14/22 1252   BP: 128/82   Pulse: 76   Resp: 14   Temp: 36.8 °C (98.3 °F)   TempSrc: Temporal   SpO2: 97%   Weight: 103 kg (228 lb)   Height: 1.702 m (5' 7\")       Physical Exam:    Constitutional: Vital signs reviewed. Appears well-developed and well-nourished. No acute distress.   Eyes: Sclera white, conjunctivae clear.   ENT: External ears normal. External auditory canals normal without discharge. TMs translucent and non-bulging. Hearing normal. Lips/teeth are normal. Oral mucosa pink and moist. Posterior pharynx: mildly-moderately erythematous.  Neck: Neck supple.   Pulmonary/Chest: Respirations non-labored.   Lymph: Bilateral anterior cervical nodes are tender to palpation.  Musculoskeletal: Normal gait. No muscular atrophy or weakness.  Neurological: Alert and oriented to person, place, and time. Muscle tone normal. Coordination normal.   Skin: No rashes or lesions. Warm, dry, normal turgor.  Psychiatric: Normal mood and affect. Behavior is normal. Judgment and thought content normal.       Medical Decision Makin. Acute streptococcal pharyngitis  - cefTRIAXone (ROCEPHIN) 500 mg, lidocaine (XYLOCAINE) 1 % 1.8 mL for IM use  - amoxicillin-clavulanate (AUGMENTIN) 875-125 MG Tab; 1 TAB BY MOUTH TWICE A DAY X 10 DAYS. TAKE WITH FOOD.  Dispense: 20 Tablet; Refill: 0  - predniSONE (DELTASONE) 20 MG Tab; 1 TAB BY MOUTH UP TO TWICE A DAY ONLY IF NEEDED FOR SORE OR SWOLLEN THROAT TO HELP INFLAMMATION. TAKE WITH FOOD.  Dispense: 14 Tablet; Refill: 0       Work note given - excuse for 10/17/22.    Discussed with her DDX, management options, and risks, benefits, and alternatives to treatment plan agreed upon.    Saw other provider on 10/11/22, had positive Rapid Strep test and negative Covid test, was rx'd Amoxil 500 mg BID x 10 days, visit reviewed. She is getting worse, despite taking Amoxil. Throat hurting more. Some nasal symptoms with " clear mucus from nose. Has tender neck glands.    Posterior pharynx: mildly-moderately erythematous. Bilateral anterior cervical nodes are tender to palpation.    Complicated condition due to worsening symptoms despite treatment with other provider on 10/11/22.    D/c Amoxil rx'd on 10/11/22.    Agreeable to medications prescribed.    Discussed expected course of duration, time for improvement, and to seek follow-up in Emergency Room, urgent care, or with PCP if getting worse at any time or not improving within expected time frame.

## 2022-10-14 NOTE — LETTER
October 14, 2022         Patient: Pema Hines   YOB: 2000   Date of Visit: 10/14/2022           To Whom it May Concern:    Pema Hines was seen in my clinic on 10/14/2022.     Please excuse from work for 10/17/22 due to medical condition.    If you have any questions or concerns, please don't hesitate to call.        Sincerely,           Darvin Reardon M.D.  Electronically Signed

## 2022-10-25 ENCOUNTER — NON-PROVIDER VISIT (OUTPATIENT)
Dept: MEDICAL GROUP | Facility: PHYSICIAN GROUP | Age: 22
End: 2022-10-25
Payer: COMMERCIAL

## 2022-10-25 DIAGNOSIS — Z30.42 ENCOUNTER FOR SURVEILLANCE OF INJECTABLE CONTRACEPTIVE: ICD-10-CM

## 2022-10-25 LAB
INT CON NEG: NORMAL
INT CON POS: NORMAL
POC URINE PREGNANCY TEST: NORMAL

## 2022-10-25 PROCEDURE — 96372 THER/PROPH/DIAG INJ SC/IM: CPT | Performed by: NURSE PRACTITIONER

## 2022-10-25 PROCEDURE — 81025 URINE PREGNANCY TEST: CPT | Performed by: FAMILY MEDICINE

## 2022-10-25 RX ORDER — MEDROXYPROGESTERONE ACETATE 150 MG/ML
150 INJECTION, SUSPENSION INTRAMUSCULAR ONCE
Status: COMPLETED | OUTPATIENT
Start: 2022-10-25 | End: 2022-10-25

## 2022-10-25 RX ADMIN — MEDROXYPROGESTERONE ACETATE 150 MG: 150 INJECTION, SUSPENSION INTRAMUSCULAR at 10:18

## 2022-10-25 NOTE — PROGRESS NOTES
Alexandra Hines is a 21 y.o. here for a Depo Provera Injection.     Date of last Depo Provera Injection: 03/24/2022  Current date within therapeutic range?: No   Urine pregnancy test done (needed if out of date range): yes-NEG  Date of last office visit:[id number]  Date of last pap (if > 21 years old)/ GYN exam: N/A  Dx: Contraceptive use    Patient tolerated injection and no adverse effects were observed or reported: Yes    # of Administrations remaining in MAR: 0  Next injection due between 01/10/2023 and 01/24/2023.

## 2022-11-26 ENCOUNTER — APPOINTMENT (OUTPATIENT)
Dept: RADIOLOGY | Facility: MEDICAL CENTER | Age: 22
End: 2022-11-26
Attending: EMERGENCY MEDICINE
Payer: COMMERCIAL

## 2022-11-26 ENCOUNTER — HOSPITAL ENCOUNTER (EMERGENCY)
Facility: MEDICAL CENTER | Age: 22
End: 2022-11-26
Attending: EMERGENCY MEDICINE
Payer: COMMERCIAL

## 2022-11-26 VITALS
TEMPERATURE: 97.4 F | DIASTOLIC BLOOD PRESSURE: 72 MMHG | HEIGHT: 67 IN | HEART RATE: 92 BPM | RESPIRATION RATE: 16 BRPM | SYSTOLIC BLOOD PRESSURE: 123 MMHG | WEIGHT: 224.87 LBS | BODY MASS INDEX: 35.29 KG/M2 | OXYGEN SATURATION: 94 %

## 2022-11-26 DIAGNOSIS — K62.5 RECTAL BLEEDING: ICD-10-CM

## 2022-11-26 LAB
ABO GROUP BLD: NORMAL
ALBUMIN SERPL BCP-MCNC: 4.4 G/DL (ref 3.2–4.9)
ALBUMIN/GLOB SERPL: 1.5 G/DL
ALP SERPL-CCNC: 91 U/L (ref 30–99)
ALT SERPL-CCNC: 31 U/L (ref 2–50)
ANION GAP SERPL CALC-SCNC: 11 MMOL/L (ref 7–16)
APTT PPP: 26.8 SEC (ref 24.7–36)
AST SERPL-CCNC: 17 U/L (ref 12–45)
BASOPHILS # BLD AUTO: 0.2 % (ref 0–1.8)
BASOPHILS # BLD: 0.02 K/UL (ref 0–0.12)
BILIRUB SERPL-MCNC: 0.2 MG/DL (ref 0.1–1.5)
BLD GP AB SCN SERPL QL: NORMAL
BUN SERPL-MCNC: 10 MG/DL (ref 8–22)
CALCIUM SERPL-MCNC: 9.6 MG/DL (ref 8.5–10.5)
CHLORIDE SERPL-SCNC: 108 MMOL/L (ref 96–112)
CO2 SERPL-SCNC: 21 MMOL/L (ref 20–33)
CREAT SERPL-MCNC: 0.69 MG/DL (ref 0.5–1.4)
EOSINOPHIL # BLD AUTO: 0.13 K/UL (ref 0–0.51)
EOSINOPHIL NFR BLD: 1.6 % (ref 0–6.9)
ERYTHROCYTE [DISTWIDTH] IN BLOOD BY AUTOMATED COUNT: 40.4 FL (ref 35.9–50)
GFR SERPLBLD CREATININE-BSD FMLA CKD-EPI: 125 ML/MIN/1.73 M 2
GLOBULIN SER CALC-MCNC: 3 G/DL (ref 1.9–3.5)
GLUCOSE SERPL-MCNC: 96 MG/DL (ref 65–99)
HCT VFR BLD AUTO: 44.3 % (ref 37–47)
HGB BLD-MCNC: 14.3 G/DL (ref 12–16)
IMM GRANULOCYTES # BLD AUTO: 0.02 K/UL (ref 0–0.11)
IMM GRANULOCYTES NFR BLD AUTO: 0.2 % (ref 0–0.9)
INR PPP: 1.09 (ref 0.87–1.13)
LIPASE SERPL-CCNC: 21 U/L (ref 11–82)
LYMPHOCYTES # BLD AUTO: 1.81 K/UL (ref 1–4.8)
LYMPHOCYTES NFR BLD: 22 % (ref 22–41)
MCH RBC QN AUTO: 28.9 PG (ref 27–33)
MCHC RBC AUTO-ENTMCNC: 32.3 G/DL (ref 33.6–35)
MCV RBC AUTO: 89.5 FL (ref 81.4–97.8)
MONOCYTES # BLD AUTO: 0.68 K/UL (ref 0–0.85)
MONOCYTES NFR BLD AUTO: 8.3 % (ref 0–13.4)
NEUTROPHILS # BLD AUTO: 5.58 K/UL (ref 2–7.15)
NEUTROPHILS NFR BLD: 67.7 % (ref 44–72)
NRBC # BLD AUTO: 0 K/UL
NRBC BLD-RTO: 0 /100 WBC
PLATELET # BLD AUTO: 278 K/UL (ref 164–446)
PMV BLD AUTO: 12.6 FL (ref 9–12.9)
POTASSIUM SERPL-SCNC: 3.8 MMOL/L (ref 3.6–5.5)
PROT SERPL-MCNC: 7.4 G/DL (ref 6–8.2)
PROTHROMBIN TIME: 14 SEC (ref 12–14.6)
RBC # BLD AUTO: 4.95 M/UL (ref 4.2–5.4)
RH BLD: NORMAL
SODIUM SERPL-SCNC: 140 MMOL/L (ref 135–145)
WBC # BLD AUTO: 8.2 K/UL (ref 4.8–10.8)

## 2022-11-26 PROCEDURE — 85610 PROTHROMBIN TIME: CPT

## 2022-11-26 PROCEDURE — 86901 BLOOD TYPING SEROLOGIC RH(D): CPT

## 2022-11-26 PROCEDURE — 83690 ASSAY OF LIPASE: CPT

## 2022-11-26 PROCEDURE — 85025 COMPLETE CBC W/AUTO DIFF WBC: CPT

## 2022-11-26 PROCEDURE — 86850 RBC ANTIBODY SCREEN: CPT

## 2022-11-26 PROCEDURE — 86900 BLOOD TYPING SEROLOGIC ABO: CPT

## 2022-11-26 PROCEDURE — 85730 THROMBOPLASTIN TIME PARTIAL: CPT

## 2022-11-26 PROCEDURE — 36415 COLL VENOUS BLD VENIPUNCTURE: CPT

## 2022-11-26 PROCEDURE — 80053 COMPREHEN METABOLIC PANEL: CPT

## 2022-11-26 PROCEDURE — 71045 X-RAY EXAM CHEST 1 VIEW: CPT

## 2022-11-26 PROCEDURE — 99283 EMERGENCY DEPT VISIT LOW MDM: CPT

## 2022-11-26 ASSESSMENT — FIBROSIS 4 INDEX: FIB4 SCORE: 0.28

## 2022-11-26 ASSESSMENT — PAIN DESCRIPTION - DESCRIPTORS: DESCRIPTORS: ACHING

## 2022-11-27 NOTE — ED NOTES
Pt provided with discharge instructions. Pt had no further questions. Pt ambulated to [facility]

## 2022-11-27 NOTE — DISCHARGE INSTRUCTIONS
Call and arrange gastroenterology follow-up and further evaluation as soon as possible.  If you are having very heavy rectal bleeding or new or worsening symptoms return for recheck.  Avoid aspirin and ibuprofen and similar medications as this can make it easier to bleed.

## 2022-11-27 NOTE — ED PROVIDER NOTES
ED Provider Note    CHIEF COMPLAINT  Chief Complaint   Patient presents with    Bloody Stools       HPI  Pema Alexandra Hines is a 21 y.o. female who presents to the emergency department complaining of bright red blood per rectum.  The patient says that yesterday she noticed some blood with a bowel movement it was enough to tinge in the toilet water red.  Today also associated with a bowel movement she noticed bright red blood and she says blood clots.  The patient feels a bit bloated.  She does not recognize any specific precipitating events or exacerbating or alleviating factors.  Patient says she has a history of irritable bowel syndrome which was diagnosed years ago and at that time she [facility] and had upper and lower endoscopy done but she has not been in contact with her GI doctors recently.  She has never suffered from GI bleeding.    REVIEW OF SYSTEMS no fever or chills no vomiting no chest pain or difficulty breathing.  All other systems negative    PAST MEDICAL HISTORY  Past Medical History:   Diagnosis Date    Allergic rhinitis     ASTHMA 8/15/2013    inhaler as needed    Bowel habit changes     diarrhea    Depression     Gynecological disorder     severe cramps and cysts    History of COVID-19     tested (+) 01-, States symptoms resolved by 01-    IBS (irritable bowel syndrome)     Menarche 8/15/2013    Started at 10. Fairly regular.    Nausea, vomiting and diarrhea 11/19/2014    Pain 03/09/2022    right ankle, 5-6/10    Snoring     no sleep study       FAMILY HISTORY  Family History   Adopted: Yes   Problem Relation Age of Onset    Other Mother         adopted    Other Father         adopted       SOCIAL HISTORY  Social History     Socioeconomic History    Marital status: Single   Tobacco Use    Smoking status: Never    Smokeless tobacco: Never   Vaping Use    Vaping Use: Never used   Substance and Sexual Activity    Alcohol use: Yes     Alcohol/week: 0.0  "oz     Comment: reports 1/month    Drug use: Not Currently    Sexual activity: Yes   Social History Narrative    Lives with mother.  Has sister.  4th grade.        SURGICAL HISTORY  Past Surgical History:   Procedure Laterality Date    PB OPEN TX DISTAL TIBIOFIBULAR JOINT DISRUPTION Right 3/15/2022    Procedure: RIGHT OPEN REDUCTION INTERNAL FIXATION FIBULA NONUNION, SYNDESMOSIS REPAIR, ANKLE ARTHROSCOPY;  Surgeon: Erwin Smith M.D.;  Location: SURGERY [facility];  Service: Orthopedics    MT UNLISTED PROC, ARTHROSCOPY Right 3/15/2022    Procedure: ARTHROSCOPY, ANKLE;  Surgeon: Erwin Smith M.D.;  Location: SURGERY [facility];  Service: Orthopedics    SOHAM BY LAPAROSCOPY N/A 12/17/2015    Procedure: SOHAM BY LAPAROSCOPY ;  Surgeon: Yuliet Dale M.D.;  Location: SURGERY [facility];  Service:     COLONOSCOPY  10/2015    ENDOSCOPY  08/2015    DENTAL EXTRACTION(S)      wisdom teeth       CURRENT MEDICATIONS  Home Medications       Reviewed by Mirna De Jesus R.N. (Registered Nurse) on 11/26/22 at 1728  Med List Status: <None>     Medication Last Dose Status   acetaminophen (TYLENOL) 500 MG Tab  Active   amoxicillin-clavulanate (AUGMENTIN) 875-125 MG Tab  Active   Ibuprofen 200 MG Cap  Active   predniSONE (DELTASONE) 20 MG Tab  Active                    ALLERGIES  No Known Allergies    PHYSICAL EXAM  VITAL SIGNS: BP (!) 99/55   Pulse 97   Temp 36.6 °C (97.9 °F) (Temporal)   Resp 16   Ht 1.702 m (5' 7\")   Wt 102 kg (224 lb 13.9 oz)   SpO2 98%   BMI 35.22 kg/m²    Oxygen saturation is interpreted as adequate  Constitutional: Awake lucid verbal she does not appear toxic or distressed  Eyes: No erythema discharge or jaundice  Neck: Trachea midline no JVD  Cardiovascular: Regular rate and rhythm  Lungs: Clear and equal bilaterally with no apparent difficulty breathing  Abdomen/Back: Soft nontender nondistended no rebound guarding or peritoneal findings bowel sounds are active.  A digital rectal " examination was done with a nurse chaperone at the bedside and stool was brown and heme-negative on the guaiac card.  Also I did not find any evidence of rectal lesions, fissures or hemorrhoids.  Skin: Warm and dry  Musculoskeletal: No acute bony deformity  Neurologic: Awake verbal moving all extremities without difficulty    Laboratory  CBC shows a normal white blood cell count of 8.2 hemoglobin is adequate at 14.3 platelet count is normal at 278 basic metabolic panel is unremarkable including a normal BUN of 10.  LFTs and lipase are unremarkable INR is normal at 1.09    Radiology  DX-CHEST-PORTABLE (1 VIEW)   Final Result      No evidence of acute cardiopulmonary process.            MEDICAL DECISION MAKING and DISPOSITION  In the emergency department clinically the patient looks well with stable vital signs.  I have reviewed all the findings with her and her family, her labs are entirely normal.  The BUN is completely normal at 10 so it seems unlikely that this is an upper GI bleed.  There is no blood detected on exam with stool guaiac testing tonight.  At this point in time I think the best course of action is going to be for the patient to follow-up with gastroenterology, I have given her the name and office number for Dr. Gerard who is on-call for [facility] and I have recommended that she either call this office or reestablish care with her previous gastroenterologist by calling Monday morning and arranging follow-up.  If she has heavy rectal bleeding or otherwise feels she is developing new or worsening symptoms she is to return here for recheck    IMPRESSION  1.  Patient complaint of rectal bleeding      Electronically signed by: Bladimir Franco M.D., 11/26/2022 7:03 PM

## 2022-12-19 ENCOUNTER — HOSPITAL ENCOUNTER (OUTPATIENT)
Dept: LAB | Facility: MEDICAL CENTER | Age: 22
End: 2022-12-19
Payer: COMMERCIAL

## 2022-12-19 LAB
CRP SERPL HS-MCNC: <0.3 MG/DL (ref 0–0.75)
ERYTHROCYTE [SEDIMENTATION RATE] IN BLOOD BY WESTERGREN METHOD: 3 MM/HOUR (ref 0–25)

## 2022-12-19 PROCEDURE — 83516 IMMUNOASSAY NONANTIBODY: CPT

## 2022-12-19 PROCEDURE — 86140 C-REACTIVE PROTEIN: CPT

## 2022-12-19 PROCEDURE — 82784 ASSAY IGA/IGD/IGG/IGM EACH: CPT

## 2022-12-19 PROCEDURE — 36415 COLL VENOUS BLD VENIPUNCTURE: CPT

## 2022-12-19 PROCEDURE — 85652 RBC SED RATE AUTOMATED: CPT

## 2022-12-20 ENCOUNTER — HOSPITAL ENCOUNTER (OUTPATIENT)
Facility: MEDICAL CENTER | Age: 22
End: 2022-12-20
Payer: COMMERCIAL

## 2022-12-20 PROCEDURE — 83993 ASSAY FOR CALPROTECTIN FECAL: CPT

## 2022-12-21 LAB
GLIADIN PEPTIDE+TTG IGA+IGG SER QL IA: 10 UNITS (ref 0–19)
IGA SERPL-MCNC: 171 MG/DL (ref 68–408)

## 2022-12-22 LAB — CALPROTECTIN STL-MCNT: 6 UG/G

## 2023-01-23 ENCOUNTER — HOSPITAL ENCOUNTER (OUTPATIENT)
Facility: MEDICAL CENTER | Age: 23
End: 2023-01-23
Attending: STUDENT IN AN ORGANIZED HEALTH CARE EDUCATION/TRAINING PROGRAM
Payer: COMMERCIAL

## 2023-01-23 ENCOUNTER — OFFICE VISIT (OUTPATIENT)
Dept: URGENT CARE | Facility: PHYSICIAN GROUP | Age: 23
End: 2023-01-23
Payer: COMMERCIAL

## 2023-01-23 VITALS
HEART RATE: 86 BPM | TEMPERATURE: 96.9 F | RESPIRATION RATE: 16 BRPM | SYSTOLIC BLOOD PRESSURE: 116 MMHG | DIASTOLIC BLOOD PRESSURE: 84 MMHG | BODY MASS INDEX: 33.74 KG/M2 | OXYGEN SATURATION: 98 % | WEIGHT: 215 LBS | HEIGHT: 67 IN

## 2023-01-23 DIAGNOSIS — J02.9 PHARYNGITIS, UNSPECIFIED ETIOLOGY: ICD-10-CM

## 2023-01-23 DIAGNOSIS — J06.9 UPPER RESPIRATORY TRACT INFECTION, UNSPECIFIED TYPE: ICD-10-CM

## 2023-01-23 LAB
INT CON NEG: NORMAL
INT CON POS: NORMAL
S PYO AG THROAT QL: NEGATIVE

## 2023-01-23 PROCEDURE — 87077 CULTURE AEROBIC IDENTIFY: CPT

## 2023-01-23 PROCEDURE — 0240U HCHG SARS-COV-2 COVID-19 NFCT DS RESP RNA 3 TRGT MIC: CPT

## 2023-01-23 PROCEDURE — 87880 STREP A ASSAY W/OPTIC: CPT | Performed by: STUDENT IN AN ORGANIZED HEALTH CARE EDUCATION/TRAINING PROGRAM

## 2023-01-23 PROCEDURE — 99213 OFFICE O/P EST LOW 20 MIN: CPT | Performed by: STUDENT IN AN ORGANIZED HEALTH CARE EDUCATION/TRAINING PROGRAM

## 2023-01-23 PROCEDURE — 87070 CULTURE OTHR SPECIMN AEROBIC: CPT

## 2023-01-23 ASSESSMENT — ENCOUNTER SYMPTOMS
SHORTNESS OF BREATH: 0
HEADACHES: 0
COUGH: 1
ABDOMINAL PAIN: 0
STRIDOR: 0
DIARRHEA: 0
TROUBLE SWALLOWING: 1
HOARSE VOICE: 1
VOMITING: 0
NECK PAIN: 0
SWOLLEN GLANDS: 0

## 2023-01-23 ASSESSMENT — FIBROSIS 4 INDEX: FIB4 SCORE: 0.25

## 2023-01-23 NOTE — PROGRESS NOTES
"Subjective     Alexandra Hines is a 22 y.o. female who presents with Pharyngitis (/)            Pharyngitis   This is a new problem. The current episode started in the past 7 days. The problem has been unchanged. Maximum temperature: subjective fever. The pain is moderate. Associated symptoms include congestion, coughing, ear pain, a hoarse voice and trouble swallowing. Pertinent negatives include no abdominal pain, diarrhea, drooling, ear discharge, headaches, plugged ear sensation, neck pain, shortness of breath, stridor, swollen glands or vomiting. She has tried nothing for the symptoms.     Review of Systems   HENT:  Positive for congestion, ear pain, hoarse voice and trouble swallowing. Negative for drooling and ear discharge.    Respiratory:  Positive for cough. Negative for shortness of breath and stridor.    Gastrointestinal:  Negative for abdominal pain, diarrhea and vomiting.   Musculoskeletal:  Negative for neck pain.   Neurological:  Negative for headaches.            Objective     /84   Pulse 86   Temp 36.1 °C (96.9 °F) (Temporal)   Resp 16   Ht 1.702 m (5' 7\")   Wt 97.5 kg (215 lb)   SpO2 98%   BMI 33.67 kg/m²      Physical Exam  Vitals reviewed.   Constitutional:       General: She is not in acute distress.     Appearance: Normal appearance. She is not ill-appearing or toxic-appearing.   HENT:      Head: Normocephalic and atraumatic.      Right Ear: Tympanic membrane, ear canal and external ear normal.      Left Ear: Tympanic membrane, ear canal and external ear normal.      Nose: Nose normal.      Mouth/Throat:      Lips: Pink.      Mouth: Mucous membranes are moist.      Pharynx: Oropharynx is clear. Uvula midline. Posterior oropharyngeal erythema present. No oropharyngeal exudate.   Eyes:      Extraocular Movements: Extraocular movements intact.      Conjunctiva/sclera: Conjunctivae normal.      Pupils: Pupils are equal, round, and reactive to light.   Cardiovascular:      Rate " and Rhythm: Normal rate and regular rhythm.   Pulmonary:      Effort: Pulmonary effort is normal.      Breath sounds: Normal breath sounds.   Musculoskeletal:         General: Normal range of motion.      Cervical back: Normal range of motion. No rigidity.   Lymphadenopathy:      Cervical: No cervical adenopathy.   Skin:     General: Skin is warm and dry.   Neurological:      General: No focal deficit present.      Mental Status: She is alert. Mental status is at baseline.                           Assessment & Plan        1. Pharyngitis, unspecified etiology  - POCT Rapid Strep A: Negative  - CULTURE THROAT; Future    2. Upper respiratory tract infection, unspecified type  - CoV-2 and Flu A/B by PCR (24 hour In-House): Collect NP swab in VTM; Future     Differential diagnoses, supportive care measures and indications for immediate follow-up discussed with patient. Pathogenesis of diagnosis discussed including typical length and natural progression.  School note provided per patient request.    Instructed to return to urgent care or nearest emergency department if symptoms fail to improve, for any change in condition, further concerns, or new concerning symptoms.    Patient states understanding and agrees with the plan of care and discharge instructions.

## 2023-01-23 NOTE — LETTER
January 23, 2023    To Whom It May Concern:         This is confirmation that Pema Hines attended her scheduled appointment with Krysten Uribe P.A.-C. on 1/23/23.  Please excuse school absences today through 1/26/23 for medical reasons. Pema can return to school on 1/27/23 or earlier if symptoms have improved/resolved and she has been without a fever for 24 hours (without treatment with antipyretic agents).         If you have any questions please do not hesitate to call me at the phone number listed below.    Sincerely,    Krysten Uribe P.A.-C.  [phone]

## 2023-01-24 LAB
FLUAV RNA SPEC QL NAA+PROBE: NEGATIVE
FLUBV RNA SPEC QL NAA+PROBE: NEGATIVE
SARS-COV-2 RNA RESP QL NAA+PROBE: NOTDETECTED
SPECIMEN SOURCE: NORMAL

## 2023-02-06 ENCOUNTER — APPOINTMENT (OUTPATIENT)
Dept: MEDICAL GROUP | Facility: PHYSICIAN GROUP | Age: 23
End: 2023-02-06
Payer: COMMERCIAL

## 2023-02-07 ENCOUNTER — NON-PROVIDER VISIT (OUTPATIENT)
Dept: MEDICAL GROUP | Facility: PHYSICIAN GROUP | Age: 23
End: 2023-02-07
Payer: COMMERCIAL

## 2023-02-07 DIAGNOSIS — Z31.69 PRE-CONCEPTION COUNSELING: ICD-10-CM

## 2023-02-07 LAB
POCT INT CON NEG: NEGATIVE
POCT INT CON POS: POSITIVE
POCT URINE PREGNANCY TEST: NEGATIVE

## 2023-02-07 PROCEDURE — 81025 URINE PREGNANCY TEST: CPT | Performed by: FAMILY MEDICINE

## 2023-02-07 RX ORDER — MEDROXYPROGESTERONE ACETATE 150 MG/ML
150 INJECTION, SUSPENSION INTRAMUSCULAR ONCE
Status: COMPLETED | OUTPATIENT
Start: 2023-02-07 | End: 2023-02-08

## 2023-02-07 NOTE — PROGRESS NOTES
Alexandra Hines is a 22 y.o. here for a Depo Provera Injection.     Date of last Depo Provera Injection: 10/25/2022  Current date within therapeutic range?: Yes   Urine pregnancy test done (needed if out of date range): not performed  Date of last office visit:02/23/2022  Date of last pap (if > 21 years old)/ GYN exam: n/a  Dx: Contraceptive use    Patient tolerated injection and no adverse effects were observed or reported: Yes    # of Administrations remaining in MAR: 0  Next injection due between 04/25 and 05/09.

## 2023-02-08 PROCEDURE — 96372 THER/PROPH/DIAG INJ SC/IM: CPT | Performed by: NURSE PRACTITIONER

## 2023-02-08 RX ADMIN — MEDROXYPROGESTERONE ACETATE 150 MG: 150 INJECTION, SUSPENSION INTRAMUSCULAR at 09:24

## 2023-02-10 ENCOUNTER — HOSPITAL ENCOUNTER (OUTPATIENT)
Facility: MEDICAL CENTER | Age: 23
End: 2023-02-10
Attending: NURSE PRACTITIONER
Payer: COMMERCIAL

## 2023-02-10 ENCOUNTER — OFFICE VISIT (OUTPATIENT)
Dept: URGENT CARE | Facility: CLINIC | Age: 23
End: 2023-02-10
Payer: COMMERCIAL

## 2023-02-10 VITALS
HEART RATE: 86 BPM | WEIGHT: 218.48 LBS | SYSTOLIC BLOOD PRESSURE: 120 MMHG | DIASTOLIC BLOOD PRESSURE: 82 MMHG | OXYGEN SATURATION: 98 % | BODY MASS INDEX: 34.29 KG/M2 | HEIGHT: 67 IN | TEMPERATURE: 98.6 F | RESPIRATION RATE: 16 BRPM

## 2023-02-10 DIAGNOSIS — N12 PYELONEPHRITIS: ICD-10-CM

## 2023-02-10 DIAGNOSIS — R11.0 NAUSEA: ICD-10-CM

## 2023-02-10 DIAGNOSIS — Z87.440 HISTORY OF RECURRENT UTI (URINARY TRACT INFECTION): ICD-10-CM

## 2023-02-10 DIAGNOSIS — R30.9 PAINFUL URINATION: ICD-10-CM

## 2023-02-10 LAB
APPEARANCE UR: ABNORMAL
BILIRUB UR STRIP-MCNC: NEGATIVE MG/DL
COLOR UR AUTO: ABNORMAL
GLUCOSE UR STRIP.AUTO-MCNC: NEGATIVE MG/DL
KETONES UR STRIP.AUTO-MCNC: NEGATIVE MG/DL
LEUKOCYTE ESTERASE UR QL STRIP.AUTO: ABNORMAL
NITRITE UR QL STRIP.AUTO: NEGATIVE
PH UR STRIP.AUTO: 5.5 [PH] (ref 5–8)
PROT UR QL STRIP: 100 MG/DL
RBC UR QL AUTO: ABNORMAL
SP GR UR STRIP.AUTO: 1.03
UROBILINOGEN UR STRIP-MCNC: 0.2 MG/DL

## 2023-02-10 PROCEDURE — 87077 CULTURE AEROBIC IDENTIFY: CPT

## 2023-02-10 PROCEDURE — 87186 SC STD MICRODIL/AGAR DIL: CPT

## 2023-02-10 PROCEDURE — 81002 URINALYSIS NONAUTO W/O SCOPE: CPT | Performed by: NURSE PRACTITIONER

## 2023-02-10 PROCEDURE — 99214 OFFICE O/P EST MOD 30 MIN: CPT | Performed by: NURSE PRACTITIONER

## 2023-02-10 PROCEDURE — 87086 URINE CULTURE/COLONY COUNT: CPT

## 2023-02-10 RX ORDER — CIPROFLOXACIN 500 MG/1
500 TABLET, FILM COATED ORAL EVERY 12 HOURS
Qty: 14 TABLET | Refills: 0 | Status: SHIPPED | OUTPATIENT
Start: 2023-02-10 | End: 2023-02-17

## 2023-02-10 RX ORDER — ONDANSETRON 4 MG/1
4 TABLET, ORALLY DISINTEGRATING ORAL EVERY 6 HOURS PRN
Qty: 10 TABLET | Refills: 0 | Status: SHIPPED | OUTPATIENT
Start: 2023-02-10 | End: 2023-02-13

## 2023-02-10 ASSESSMENT — ENCOUNTER SYMPTOMS
CHILLS: 0
NAUSEA: 1
FLANK PAIN: 1
ABDOMINAL PAIN: 1
FEVER: 0
VOMITING: 0
DIARRHEA: 0

## 2023-02-10 ASSESSMENT — FIBROSIS 4 INDEX: FIB4 SCORE: 0.25

## 2023-02-10 NOTE — LETTER
February 15, 2023         Patient: Pema Hines   YOB: 2000   Date of Visit: 2/10/2023           To Whom it May Concern:    Pema Hines was seen in my clinic on 2/10/2023. Please excuse her from work for up to 3 days due to acute illness (through 2/12/2023). She may return to work when symptoms improve..    If you have any questions or concerns, please don't hesitate to call.        Sincerely,           ROSHNI Hamilton.  Electronically Signed

## 2023-02-10 NOTE — PATIENT INSTRUCTIONS
Urinary Tract Infection, Adult  A urinary tract infection (UTI) is an infection of any part of the urinary tract. The urinary tract includes:  The kidneys.  The ureters.  The bladder.  The urethra.  These organs make, store, and get rid of pee (urine) in the body.  What are the causes?  This is caused by germs (bacteria) in your genital area. These germs grow and cause swelling (inflammation) of your urinary tract.  What increases the risk?  You are more likely to develop this condition if:  You have a small, thin tube (catheter) to drain pee.  You cannot control when you pee or poop (incontinence).  You are female, and:  You use these methods to prevent pregnancy:  A medicine that kills sperm (spermicide).  A device that blocks sperm (diaphragm).  You have low levels of a female hormone (estrogen).  You are pregnant.  You have genes that add to your risk.  You are sexually active.  You take antibiotic medicines.  You have trouble peeing because of:  A prostate that is bigger than normal, if you are male.  A blockage in the part of your body that drains pee from the bladder (urethra).  A kidney stone.  A nerve condition that affects your bladder (neurogenic bladder).  Not getting enough to drink.  Not peeing often enough.  You have other conditions, such as:  Diabetes.  A weak disease-fighting system (immune system).  Sickle cell disease.  Gout.  Injury of the spine.  What are the signs or symptoms?  Symptoms of this condition include:  Needing to pee right away (urgently).  Peeing often.  Peeing small amounts often.  Pain or burning when peeing.  Blood in the pee.  Pee that smells bad or not like normal.  Trouble peeing.  Pee that is cloudy.  Fluid coming from the vagina, if you are female.  Pain in the belly or lower back.  Other symptoms include:  Throwing up (vomiting).  No urge to eat.  Feeling mixed up (confused).  Being tired and grouchy (irritable).  A fever.  Watery poop (diarrhea).  How is this  treated?  This condition may be treated with:  Antibiotic medicine.  Other medicines.  Drinking enough water.  Follow these instructions at home:    Medicines  Take over-the-counter and prescription medicines only as told by your doctor.  If you were prescribed an antibiotic medicine, take it as told by your doctor. Do not stop taking it even if you start to feel better.  General instructions  Make sure you:  Pee until your bladder is empty.  Do not hold pee for a long time.  Empty your bladder after sex.  Wipe from front to back after pooping if you are a female. Use each tissue one time when you wipe.  Drink enough fluid to keep your pee pale yellow.  Keep all follow-up visits as told by your doctor. This is important.  Contact a doctor if:  You do not get better after 1-2 days.  Your symptoms go away and then come back.  Get help right away if:  You have very bad back pain.  You have very bad pain in your lower belly.  You have a fever.  You are sick to your stomach (nauseous).  You are throwing up.  Summary  A urinary tract infection (UTI) is an infection of any part of the urinary tract.  This condition is caused by germs in your genital area.  There are many risk factors for a UTI. These include having a small, thin tube to drain pee and not being able to control when you pee or poop.  Treatment includes antibiotic medicines for germs.  Drink enough fluid to keep your pee pale yellow.  This information is not intended to replace advice given to you by your health care provider. Make sure you discuss any questions you have with your health care provider.  Document Released: 06/05/2009 Document Revised: 12/05/2019 Document Reviewed: 06/27/2019  [employer] Patient Education © 2020 [employer]

## 2023-02-10 NOTE — PROGRESS NOTES
"Subjective     Alexandra Hines is a 22 y.o. female who presents with Painful Urination (Started on Friday ) and Abdominal Pain            Alexandra comes in today with her mother. She has a 1 week history of dysuria, progressively worse in the past 3 days with urgency, frequency, hematuria, and new onset mild nausea and flank pain today.  She is taking OTC Azo with no relief.  She denies any fever, chills, or vomiting.  She is established with a urologist due to frequent UTI. She denies any recent antibiotic use.  She denies any suspected pregnancy. She uses depo provera and had a negative pregnancy test 3 days ago when she got her most recent depo shot.        Review of Systems   Constitutional:  Positive for malaise/fatigue. Negative for chills and fever.   Gastrointestinal:  Positive for abdominal pain and nausea. Negative for diarrhea and vomiting.   Genitourinary:  Positive for dysuria, flank pain, frequency, hematuria and urgency.      Medications, Allergies, and current problem list reviewed today in Epic      Objective     Blood Pressure 120/82   Pulse 86   Temperature 37 °C (98.6 °F) (Temporal)   Respiration 16   Height 1.702 m (5' 7\")   Weight 99.1 kg (218 lb 7.6 oz)   Oxygen Saturation 98%   Body Mass Index 34.22 kg/m²      Physical Exam  Vitals reviewed.   Constitutional:       General: She is not in acute distress.     Appearance: Normal appearance. She is well-developed. She is not ill-appearing, toxic-appearing or diaphoretic.   Cardiovascular:      Rate and Rhythm: Normal rate and regular rhythm.      Heart sounds: Normal heart sounds. No murmur heard.    No friction rub. No gallop.   Pulmonary:      Effort: Pulmonary effort is normal. No respiratory distress.      Breath sounds: Normal breath sounds. No stridor. No wheezing, rhonchi or rales.   Chest:      Chest wall: No tenderness.   Abdominal:      General: Bowel sounds are normal. There is no distension or abdominal bruit.      " Palpations: Abdomen is soft. Abdomen is not rigid. There is no shifting dullness, fluid wave, mass or pulsatile mass.      Tenderness: There is no abdominal tenderness. There is right CVA tenderness and left CVA tenderness. There is no guarding or rebound. Negative signs include Vang's sign and McBurney's sign.      Comments: CVA TTP, left worse than right.  No wincing or guarding.     Skin:     General: Skin is warm and dry.      Coloration: Skin is not jaundiced or pale.   Neurological:      Mental Status: She is alert and oriented to person, place, and time.   Psychiatric:         Mood and Affect: Mood normal.              Contains abnormal data POCT Urinalysis  Order: [id number]  Status: Final result     Visible to patient: Yes (seen)     Next appt: None     Dx: Painful urination     0 Result Notes  Component Ref Range & Units 12:50 PM   (2/10/23) 2 yr ago   (12/11/20) 2 yr ago   (11/6/20) 2 yr ago   (9/30/20) 3 yr ago   (4/23/19) 6 yr ago   (2/24/16) 7 yr ago   (5/29/15)   POC Color Negative brown  yellow  miryam CM  miryam  dark yellow  miryam  yellow    POC Appearance Negative slightly cloudy  clear  cloudy  cloudy  cloudy  clear  clear    POC Glucose Negative mg/dL negative  neg  neg  neg  negative  neg  neg    POC Bilirubin Negative mg/dL negative  neg  neg  neg  negative  neg  neg    POC Ketones Negative mg/dL negative  neg  neg  neg  negative  neg  neg    POC Specific Gravity <1.005 - >1.030 1.030  1.030  1.020  1.030  1.020  1.030  1.030    POC Blood Negative large  trace  neg  tr  negative  neg  mod    POC Urine PH 5.0 - 8.0 5.5  5.5  6  5.5  6.0  6.0  5.0    POC Protein Negative mg/dL 100  neg  neg  neg  negative  trace  neg    POC Urobiligen Negative (0.2) mg/dL 0.2  0.2  1.0  0.2  0.2  neg  neg    POC Nitrites Negative negative  neg  pos  pos  negative  neg  neg    POC Leukocyte Esterase Negative small  trace  small  neg  negative  trace  mod    [facility]  [facility]                    Specimen  Collected: 02/10/23 12:50 PM Last Resulted: 02/10/23 12:51 PM                      Assessment & Plan        1. Pyelonephritis  ciprofloxacin (CIPRO) 500 MG Tab    URINE CULTURE(NEW)    CANCELED: Urine Culture      2. Nausea  ondansetron (ZOFRAN ODT) 4 MG TABLET DISPERSIBLE      3. History of recurrent UTI (urinary tract infection)        4. Painful urination  POCT Urinalysis        Discussed exam findings with Alexandra and her mother.  Differential reviewed.  Take full course of antibiotics.  Risks and benefits reviewed.  Ondansetron as prescribed prn nausea.    Maintain adequate po hydration.  OTC azo prn urinary pain.  To ER if pain worsens, or if fever or vomiting develop.  RTC in 3-4 days if symptoms persist without improvement or follow up sooner if worse.  They verbalized understanding of and agreed with plan of care.

## 2023-02-13 LAB
BACTERIA UR CULT: ABNORMAL
BACTERIA UR CULT: ABNORMAL
SIGNIFICANT IND 70042: ABNORMAL
SITE SITE: ABNORMAL
SOURCE SOURCE: ABNORMAL

## 2023-02-15 ENCOUNTER — TELEPHONE (OUTPATIENT)
Dept: URGENT CARE | Facility: CLINIC | Age: 23
End: 2023-02-15

## 2023-02-21 ENCOUNTER — TELEPHONE (OUTPATIENT)
Dept: URGENT CARE | Facility: CLINIC | Age: 23
End: 2023-02-21
Payer: COMMERCIAL

## 2023-02-22 ENCOUNTER — OFFICE VISIT (OUTPATIENT)
Dept: URGENT CARE | Facility: CLINIC | Age: 23
End: 2023-02-22
Payer: COMMERCIAL

## 2023-02-22 VITALS
RESPIRATION RATE: 18 BRPM | WEIGHT: 218 LBS | TEMPERATURE: 98 F | HEIGHT: 67 IN | OXYGEN SATURATION: 97 % | SYSTOLIC BLOOD PRESSURE: 118 MMHG | DIASTOLIC BLOOD PRESSURE: 70 MMHG | BODY MASS INDEX: 34.21 KG/M2 | HEART RATE: 74 BPM

## 2023-02-22 DIAGNOSIS — N30.00 ACUTE CYSTITIS WITHOUT HEMATURIA: ICD-10-CM

## 2023-02-22 DIAGNOSIS — R30.0 DYSURIA: ICD-10-CM

## 2023-02-22 LAB
APPEARANCE UR: CLEAR
BILIRUB UR STRIP-MCNC: NORMAL MG/DL
COLOR UR AUTO: YELLOW
GLUCOSE UR STRIP.AUTO-MCNC: NORMAL MG/DL
KETONES UR STRIP.AUTO-MCNC: NORMAL MG/DL
LEUKOCYTE ESTERASE UR QL STRIP.AUTO: NORMAL
NITRITE UR QL STRIP.AUTO: NORMAL
PH UR STRIP.AUTO: 5.5 [PH] (ref 5–8)
PROT UR QL STRIP: NORMAL MG/DL
RBC UR QL AUTO: NORMAL
SP GR UR STRIP.AUTO: 1.02
UROBILINOGEN UR STRIP-MCNC: 0.2 MG/DL

## 2023-02-22 PROCEDURE — 81002 URINALYSIS NONAUTO W/O SCOPE: CPT | Performed by: FAMILY MEDICINE

## 2023-02-22 PROCEDURE — 99213 OFFICE O/P EST LOW 20 MIN: CPT | Performed by: FAMILY MEDICINE

## 2023-02-22 RX ORDER — SULFAMETHOXAZOLE AND TRIMETHOPRIM 800; 160 MG/1; MG/1
1 TABLET ORAL 2 TIMES DAILY
Qty: 20 TABLET | Refills: 0 | Status: SHIPPED | OUTPATIENT
Start: 2023-02-22 | End: 2023-03-04

## 2023-02-22 ASSESSMENT — ENCOUNTER SYMPTOMS
ABDOMINAL PAIN: 1
CHILLS: 0
CONSTITUTIONAL NEGATIVE: 1
FLANK PAIN: 0
FEVER: 0
BACK PAIN: 0

## 2023-02-22 ASSESSMENT — FIBROSIS 4 INDEX: FIB4 SCORE: 0.25

## 2023-02-22 NOTE — PROGRESS NOTES
"Subjective:   Pema Hines is a 22 y.o. female who presents for Dysuria (Recurring Sx of UTI after treatment w/ Abx.)      Dysuria   Associated symptoms include frequency and urgency. Pertinent negatives include no chills, flank pain or hematuria.     Review of Systems   Constitutional: Negative.  Negative for chills and fever.   Gastrointestinal:  Positive for abdominal pain.   Genitourinary:  Positive for dysuria, frequency and urgency. Negative for flank pain and hematuria.   Musculoskeletal:  Negative for back pain.     Medications, Allergies, and current problem list reviewed today in Epic.     Objective:     /70   Pulse 74   Temp 36.7 °C (98 °F) (Temporal)   Resp 18   Ht 1.702 m (5' 7\")   Wt 98.9 kg (218 lb)   SpO2 97%     Physical Exam  Vitals and nursing note reviewed.   Constitutional:       Appearance: Normal appearance.   HENT:      Head: Normocephalic and atraumatic.   Cardiovascular:      Rate and Rhythm: Normal rate and regular rhythm.      Pulses: Normal pulses.      Heart sounds: Normal heart sounds.   Pulmonary:      Effort: Pulmonary effort is normal.      Breath sounds: Normal breath sounds.   Abdominal:      General: Abdomen is flat. Bowel sounds are normal.      Palpations: Abdomen is soft.      Tenderness: There is abdominal tenderness.   Neurological:      Mental Status: She is alert.       Assessment/Plan:     Diagnosis and associated orders:     1. Acute cystitis without hematuria  sulfamethoxazole-trimethoprim (BACTRIM DS) 800-160 MG tablet      2. Dysuria  POCT Urinalysis         Comments/MDM:              Differential diagnosis, natural history, supportive care, and indications for immediate follow-up discussed.    Advised the patient to follow-up with the primary care physician for recheck, reevaluation, and consideration of further management.    Please note that this dictation was created using voice recognition software. I have made a reasonable attempt to " correct obvious errors, but I expect that there are errors of grammar and possibly content that I did not discover before finalizing the note.    This note was electronically signed by Ayaan Frankel M.D.

## 2023-03-03 ENCOUNTER — HOSPITAL ENCOUNTER (OUTPATIENT)
Facility: MEDICAL CENTER | Age: 23
End: 2023-03-03
Attending: OBSTETRICS & GYNECOLOGY
Payer: COMMERCIAL

## 2023-03-03 PROCEDURE — 87591 N.GONORRHOEAE DNA AMP PROB: CPT

## 2023-03-03 PROCEDURE — 87491 CHLMYD TRACH DNA AMP PROBE: CPT

## 2023-03-03 PROCEDURE — 88175 CYTOPATH C/V AUTO FLUID REDO: CPT

## 2023-05-07 ENCOUNTER — HOSPITAL ENCOUNTER (EMERGENCY)
Facility: MEDICAL CENTER | Age: 23
End: 2023-05-07
Attending: EMERGENCY MEDICINE
Payer: COMMERCIAL

## 2023-05-07 VITALS
OXYGEN SATURATION: 97 % | WEIGHT: 221.78 LBS | RESPIRATION RATE: 16 BRPM | HEIGHT: 67 IN | HEART RATE: 94 BPM | TEMPERATURE: 97 F | SYSTOLIC BLOOD PRESSURE: 144 MMHG | BODY MASS INDEX: 34.81 KG/M2 | DIASTOLIC BLOOD PRESSURE: 95 MMHG

## 2023-05-07 DIAGNOSIS — K60.2 ANAL FISSURE: ICD-10-CM

## 2023-05-07 PROCEDURE — 99284 EMERGENCY DEPT VISIT MOD MDM: CPT

## 2023-05-07 ASSESSMENT — FIBROSIS 4 INDEX: FIB4 SCORE: 0.25

## 2023-05-07 ASSESSMENT — PAIN DESCRIPTION - DESCRIPTORS: DESCRIPTORS: CRAMPING;SHARP;THROBBING

## 2023-05-08 NOTE — ED NOTES
Pt provided discharge instructions. Pt verbalized understanding of all instructions. Pt ambulatory to lobby w/ steady gait.

## 2023-05-08 NOTE — DISCHARGE INSTRUCTIONS
[facility].  Utilize sitz bath's as discussed.  Return if you are acutely worse.

## 2023-05-08 NOTE — ED PROVIDER NOTES
ED Provider Note    CHIEF COMPLAINT  Chief Complaint   Patient presents with    Bloody Stools     Hx of bloody stools, was told by GI to come to the hospital if it happens again.       EXTERNAL RECORDS REVIEWED  Other reviewed the patient's ER visit from 26 November when she presented with similar complaints with bright red blood per her rectum.  The patient was guaiac negative on that exam and was discharged with instructions to follow-up with GI.    HPI/ROS    Pema Hines is a 22 y.o. female who presents rectal bleeding.  The patient states has had this for several months.  As mentioned above she was seen here in the emergency department November 26, 2022 and was guaiac negative at that time.  Her hemoglobin hematocrit was stable from prior and she was discharged with instructions to follow-up with GI.  Unfortunately she cannot get into see the gastroenterologist before she went [place].  She states when she was in [facility] she had continued bleeding and was dilated while in [facility] and she states that her hemoglobin to continue to be stable and she was discharged with instructions to follow-up with GI when she returns to the [facility].  She did see GI consultants and she states she had a colonoscopy within the last couple months that was negative.  She does not take any anticoagulants.  She does have some right lower quadrant abdominal pain.  She has not had any fevers.  She has had some nausea but no vomiting.    PAST MEDICAL HISTORY   has a past medical history of Allergic rhinitis, ASTHMA (8/15/2013), Bowel habit changes, Depression, Gynecological disorder, History of COVID-19, IBS (irritable bowel syndrome), Menarche (8/15/2013), Nausea, vomiting and diarrhea (11/19/2014), Pain (03/09/2022), and Snoring.    SURGICAL HISTORY   has a past surgical history that includes colonoscopy (10/2015); endoscopy (08/2015); melanie by laparoscopy (N/A, 12/17/2015); dental extraction(s); open tx distal  "tibiofibular joint disruption (Right, 3/15/2022); and unlisted proc, arthroscopy (Right, 3/15/2022).    FAMILY HISTORY  Family History   Adopted: Yes   Problem Relation Age of Onset    Other Mother         adopted    Other Father         adopted       SOCIAL HISTORY  Social History     Tobacco Use    Smoking status: Never    Smokeless tobacco: Never   Vaping Use    Vaping Use: Never used   Substance and Sexual Activity    Alcohol use: Yes     Alcohol/week: 0.0 oz     Comment: reports 1/month    Drug use: Not Currently    Sexual activity: Yes       CURRENT MEDICATIONS  Home Medications       Reviewed by Kvng Knox R.N. (Registered Nurse) on 05/07/23 at 2006  Med List Status: Not Addressed     Medication Last Dose Status        Patient Pedro Taking any Medications                           ALLERGIES  No Known Allergies    PHYSICAL EXAM  VITAL SIGNS: /77   Pulse 92   Temp 36 °C (96.8 °F) (Temporal)   Resp 20   Ht 1.702 m (5' 7\")   Wt 101 kg (221 lb 12.5 oz)   LMP  (LMP Unknown)   SpO2 98%   BMI 34.74 kg/m²    In general the patient does not appear toxic    HEENT unremarkable    Pulmonary chest clear to auscultation bilaterally    Cardiovascular S1-S2 with a regular rate and rhythm    GI the patient does have some right lower quadrant discomfort with no distention and normal bowel sounds    Rectal examination the patient has several superficial fissures with mild active bleeding    Skin no pallor    Extremities no edema    Neurologic examination is grossly intact          COURSE & MEDICAL DECISION MAKING  This a 22-year-old female who presents with bright red per her rectum.  Clinically she does have some fissures I suspect this is the source.  We did check orthostatics that are negative and therefore laboratory analysis will be canceled that was ordered in triage.  I did review her prior labs and they have been stable with her rectal bleeding in the past.  I suspect these are from fissures.  I cannot " rule out inflammatory bowel disease and I would like her to follow-up with GI consultants for further outpatient management.  She will return for increased bleeding.  I like the patient to perform sitz bath 3-4 times a day.    FINAL DIAGNOSIS  Rectal fissure    Disposition  Patient will be discharged in stable condition       Electronically signed by: Alejandro Yost M.D., 5/7/2023 8:27 PM

## 2023-05-08 NOTE — ED TRIAGE NOTES
.  Chief Complaint   Patient presents with    Bloody Stools     Hx of bloody stools, was told by GI to come to the hospital if it happens again.    22 yr patient walked in to triage with a steady gait for above complaint. NAD patient explained the triage process.    EXERTIONAL DYSPNEA

## 2024-07-24 ENCOUNTER — APPOINTMENT (OUTPATIENT)
Dept: URGENT CARE | Facility: CLINIC | Age: 24
End: 2024-07-24

## 2024-09-10 ENCOUNTER — OFFICE VISIT (OUTPATIENT)
Dept: URGENT CARE | Facility: PHYSICIAN GROUP | Age: 24
End: 2024-09-10
Payer: COMMERCIAL

## 2024-09-10 VITALS
BODY MASS INDEX: 31.23 KG/M2 | WEIGHT: 199 LBS | RESPIRATION RATE: 20 BRPM | HEART RATE: 61 BPM | DIASTOLIC BLOOD PRESSURE: 70 MMHG | SYSTOLIC BLOOD PRESSURE: 108 MMHG | TEMPERATURE: 98.4 F | OXYGEN SATURATION: 97 % | HEIGHT: 67 IN

## 2024-09-10 DIAGNOSIS — J01.10 ACUTE NON-RECURRENT FRONTAL SINUSITIS: ICD-10-CM

## 2024-09-10 PROCEDURE — 3078F DIAST BP <80 MM HG: CPT

## 2024-09-10 PROCEDURE — 99213 OFFICE O/P EST LOW 20 MIN: CPT

## 2024-09-10 PROCEDURE — 3074F SYST BP LT 130 MM HG: CPT

## 2024-09-10 RX ORDER — FLUTICASONE PROPIONATE 50 MCG
2 SPRAY, SUSPENSION (ML) NASAL
Qty: 16 G | Refills: 1 | Status: SHIPPED | OUTPATIENT
Start: 2024-09-10

## 2024-09-10 ASSESSMENT — ENCOUNTER SYMPTOMS
ABDOMINAL PAIN: 0
COUGH: 0
HEADACHES: 1
CHILLS: 0
VOMITING: 0
SHORTNESS OF BREATH: 0
SORE THROAT: 0
SINUS PAIN: 1
FEVER: 0
NAUSEA: 0

## 2024-09-10 ASSESSMENT — FIBROSIS 4 INDEX: FIB4 SCORE: 0.26

## 2024-09-11 NOTE — PROGRESS NOTES
Chief Complaint   Patient presents with    Sinusitis     X 1 week.        HISTORY OF PRESENT ILLNESS: Patient is a pleasant 23 y.o. female who presents to urgent care today ongoing sinus pain and pressure with nasal congestion and right-sided ear pain for the last week.  Patient has been taking decongestants with little to no relief.    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    Acute cystitis without hematuria 11/06/2020    Pain in both hands 06/30/2020    Rib pain 06/30/2020    Seasonal allergies 06/30/2020    Encounter for surveillance of contraceptives 04/02/2020    Plantar wart 04/02/2020    Acute swimmer's ear of both sides 07/30/2018    Nausea 05/22/2018    Dizziness 05/08/2018    Pharyngitis 04/12/2018    Bilateral hearing loss 04/12/2018    Pain in both wrists 01/16/2018    Chronic intractable headache 06/19/2017    Anxiety, generalized 06/19/2017    Irritable bowel syndrome 06/19/2017    Primary insomnia 10/19/2016    Mood changes 10/19/2016    Functional abdominal pain syndrome 06/02/2016    Cephalalgia 06/02/2016    Food allergy 04/21/2016    Seasonal allergic rhinitis 04/21/2016    Eczema 02/24/2016    Gastroesophageal reflux disease with esophagitis 02/24/2016    Abdominal cramps 12/17/2015    Vitamin D deficiency disease 05/29/2015    Asthma, mild intermittent, well-controlled 04/27/2015    Multiple joint pain 03/08/2015    Dysmenorrhea 03/08/2015    Irregular menstrual cycle 03/08/2015    Decreased hearing of right ear 09/28/2013       Allergies:Patient has no known allergies.    Current Outpatient Medications Ordered in Epic   Medication Sig Dispense Refill    amoxicillin-clavulanate (AUGMENTIN) 875-125 MG Tab Take 1 Tablet by mouth 2 times a day for 5 days. 10 Tablet 0    fluticasone (FLONASE) 50 MCG/ACT nasal  "spray Administer 2 Sprays into affected nostril(S) at bedtime. 16 g 1     No current Epic-ordered facility-administered medications on file.       Past Medical History:   Diagnosis Date    Allergic rhinitis     ASTHMA 8/15/2013    inhaler as needed    Bowel habit changes     diarrhea    Depression     Gynecological disorder     severe cramps and cysts    History of COVID-19     tested (+) 01-, States symptoms resolved by 01-    IBS (irritable bowel syndrome)     Menarche 8/15/2013    Started at 10. Fairly regular.    Nausea, vomiting and diarrhea 11/19/2014    Pain 03/09/2022    right ankle, 5-6/10    Snoring     no sleep study       Social History     Tobacco Use    Smoking status: Never    Smokeless tobacco: Never   Vaping Use    Vaping status: Never Used   Substance Use Topics    Alcohol use: Yes     Alcohol/week: 0.0 oz     Comment: reports 1/month    Drug use: Not Currently       Family Status   Relation Name Status    Mo  Alive        adopted    Fa  Alive   No partnership data on file     Family History   Adopted: Yes   Problem Relation Age of Onset    Other Mother         adopted    Other Father         adopted       Review of Systems   Constitutional:  Positive for malaise/fatigue. Negative for chills and fever.   HENT:  Positive for congestion and sinus pain. Negative for ear pain and sore throat.    Respiratory:  Negative for cough and shortness of breath.    Gastrointestinal:  Negative for abdominal pain, nausea and vomiting.   Skin:  Negative for rash.   Neurological:  Positive for headaches.       Exam:  /70 (BP Location: Left arm, Patient Position: Sitting, BP Cuff Size: Adult long)   Pulse 61   Temp 36.9 °C (98.4 °F) (Temporal)   Resp 20   Ht 1.702 m (5' 7\")   Wt 90.3 kg (199 lb)   SpO2 97%   Physical Exam  Constitutional:       Appearance: Normal appearance. She is normal weight. She is not toxic-appearing.   HENT:      Head: Normocephalic.      Salivary Glands: Right " salivary gland is tender. Left salivary gland is tender.      Right Ear: Tympanic membrane, ear canal and external ear normal. There is no impacted cerumen.      Left Ear: Tympanic membrane, ear canal and external ear normal. There is no impacted cerumen.      Nose: Congestion and rhinorrhea present. No nasal tenderness or mucosal edema. Rhinorrhea is clear.      Mouth/Throat:      Mouth: Mucous membranes are moist.      Pharynx: Posterior oropharyngeal erythema present. No oropharyngeal exudate.      Tonsils: No tonsillar exudate. 1+ on the right. 1+ on the left.   Eyes:      General:         Right eye: No discharge.         Left eye: No discharge.      Extraocular Movements: Extraocular movements intact.      Conjunctiva/sclera: Conjunctivae normal.      Pupils: Pupils are equal, round, and reactive to light.   Cardiovascular:      Rate and Rhythm: Normal rate and regular rhythm.      Pulses: Normal pulses.      Heart sounds: Normal heart sounds. No murmur heard.  Pulmonary:      Effort: Pulmonary effort is normal. No respiratory distress.      Breath sounds: Normal breath sounds. No stridor. No wheezing, rhonchi or rales.   Musculoskeletal:         General: Normal range of motion.      Cervical back: Normal range of motion and neck supple. No tenderness.   Skin:     General: Skin is warm and dry.      Capillary Refill: Capillary refill takes less than 2 seconds.   Neurological:      General: No focal deficit present.      Mental Status: She is alert.   Psychiatric:         Mood and Affect: Mood normal.         Behavior: Behavior normal.         Thought Content: Thought content normal.         Judgment: Judgment normal.             Assessment/Plan:  1. Acute non-recurrent frontal sinusitis  - amoxicillin-clavulanate (AUGMENTIN) 875-125 MG Tab; Take 1 Tablet by mouth 2 times a day for 5 days.  Dispense: 10 Tablet; Refill: 0  - fluticasone (FLONASE) 50 MCG/ACT nasal spray; Administer 2 Sprays into affected  nostril(S) at bedtime.  Dispense: 16 g; Refill: 1    Based on physical exam along with review of systems I did provide Augmentin today as patient has been sick for over a week with no relief from over-the-counter cold medications.  Medications in the form of Flonase sent as well.  Patient advised take medication with food, drink plenty of fluids.    Supportive care, differential diagnoses, and indications for immediate follow-up discussed with patient.   Pathogenesis of diagnosis discussed including typical length and natural progression.   Instructed to return to clinic or nearest emergency department for any change in condition, further concerns, or worsening of symptoms.  Patient states understanding of the plan of care and discharge instructions.  Instructed to make an appointment, for follow up, with primary care provider.      Please note that this dictation was created using voice recognition software. I have made every reasonable attempt to correct obvious errors, but I expect that there are errors of grammar and possibly content that I did not discover before finalizing the note.      Mirna FRANKS

## 2024-09-24 ENCOUNTER — HOSPITAL ENCOUNTER (OUTPATIENT)
Facility: MEDICAL CENTER | Age: 24
End: 2024-09-24
Attending: NURSE PRACTITIONER
Payer: COMMERCIAL

## 2024-09-24 ENCOUNTER — OFFICE VISIT (OUTPATIENT)
Dept: URGENT CARE | Facility: PHYSICIAN GROUP | Age: 24
End: 2024-09-24
Payer: COMMERCIAL

## 2024-09-24 VITALS
DIASTOLIC BLOOD PRESSURE: 78 MMHG | SYSTOLIC BLOOD PRESSURE: 114 MMHG | TEMPERATURE: 98.2 F | HEIGHT: 67 IN | WEIGHT: 195 LBS | BODY MASS INDEX: 30.61 KG/M2 | HEART RATE: 91 BPM | RESPIRATION RATE: 16 BRPM | OXYGEN SATURATION: 97 %

## 2024-09-24 DIAGNOSIS — J02.9 SORE THROAT: ICD-10-CM

## 2024-09-24 DIAGNOSIS — N89.8 VAGINAL DISCHARGE: ICD-10-CM

## 2024-09-24 DIAGNOSIS — N30.01 ACUTE CYSTITIS WITH HEMATURIA: ICD-10-CM

## 2024-09-24 DIAGNOSIS — R30.0 DYSURIA: ICD-10-CM

## 2024-09-24 LAB
APPEARANCE UR: NORMAL
BILIRUB UR STRIP-MCNC: NORMAL MG/DL
COLOR UR AUTO: NORMAL
FLUAV RNA SPEC QL NAA+PROBE: NEGATIVE
FLUBV RNA SPEC QL NAA+PROBE: NEGATIVE
GLUCOSE UR STRIP.AUTO-MCNC: NORMAL MG/DL
KETONES UR STRIP.AUTO-MCNC: NORMAL MG/DL
LEUKOCYTE ESTERASE UR QL STRIP.AUTO: NORMAL
NITRITE UR QL STRIP.AUTO: NORMAL
PH UR STRIP.AUTO: 5.5 [PH] (ref 5–8)
PROT UR QL STRIP: 30 MG/DL
RBC UR QL AUTO: NORMAL
RSV RNA SPEC QL NAA+PROBE: NEGATIVE
S PYO DNA SPEC NAA+PROBE: NOT DETECTED
SARS-COV-2 RNA RESP QL NAA+PROBE: NEGATIVE
SP GR UR STRIP.AUTO: >=1.03
UROBILINOGEN UR STRIP-MCNC: 0.2 MG/DL

## 2024-09-24 PROCEDURE — 81002 URINALYSIS NONAUTO W/O SCOPE: CPT | Performed by: NURSE PRACTITIONER

## 2024-09-24 PROCEDURE — 87510 GARDNER VAG DNA DIR PROBE: CPT

## 2024-09-24 PROCEDURE — 0241U POCT CEPHEID COV-2, FLU A/B, RSV - PCR: CPT | Performed by: NURSE PRACTITIONER

## 2024-09-24 PROCEDURE — 87086 URINE CULTURE/COLONY COUNT: CPT

## 2024-09-24 PROCEDURE — 87077 CULTURE AEROBIC IDENTIFY: CPT

## 2024-09-24 PROCEDURE — 87070 CULTURE OTHR SPECIMN AEROBIC: CPT

## 2024-09-24 PROCEDURE — 3074F SYST BP LT 130 MM HG: CPT | Performed by: NURSE PRACTITIONER

## 2024-09-24 PROCEDURE — 87660 TRICHOMONAS VAGIN DIR PROBE: CPT

## 2024-09-24 PROCEDURE — 3078F DIAST BP <80 MM HG: CPT | Performed by: NURSE PRACTITIONER

## 2024-09-24 PROCEDURE — 99214 OFFICE O/P EST MOD 30 MIN: CPT | Mod: 25 | Performed by: NURSE PRACTITIONER

## 2024-09-24 PROCEDURE — 87651 STREP A DNA AMP PROBE: CPT | Performed by: NURSE PRACTITIONER

## 2024-09-24 PROCEDURE — 87480 CANDIDA DNA DIR PROBE: CPT

## 2024-09-24 RX ORDER — CEFDINIR 300 MG/1
300 CAPSULE ORAL 2 TIMES DAILY
Qty: 14 CAPSULE | Refills: 0 | Status: SHIPPED | OUTPATIENT
Start: 2024-09-24 | End: 2024-10-01

## 2024-09-24 RX ORDER — CEFDINIR 300 MG/1
300 CAPSULE ORAL 2 TIMES DAILY
Qty: 14 CAPSULE | Refills: 0 | Status: SHIPPED | OUTPATIENT
Start: 2024-09-24 | End: 2024-09-24

## 2024-09-24 RX ORDER — DEXAMETHASONE SODIUM PHOSPHATE 10 MG/ML
10 INJECTION INTRAMUSCULAR; INTRAVENOUS ONCE
Status: SHIPPED | OUTPATIENT
Start: 2024-09-24 | End: 2024-09-25

## 2024-09-24 ASSESSMENT — FIBROSIS 4 INDEX: FIB4 SCORE: 0.26

## 2024-09-24 NOTE — LETTER
September 24, 2024    To Whom It May Concern:         This is confirmation that Pema Hines attended her scheduled appointment with MARIEL Gutierrez on 9/24/24. Please excuse her absence due to an acute illness. She may return to work on 9/27/2024 or sooner if better.          If you have any questions please do not hesitate to call me at the phone number listed below.    Sincerely,          Claribel Wilkins A.P.R.N.  [phone]

## 2024-09-25 DIAGNOSIS — J02.9 SORE THROAT: ICD-10-CM

## 2024-09-25 DIAGNOSIS — N30.01 ACUTE CYSTITIS WITH HEMATURIA: ICD-10-CM

## 2024-09-25 DIAGNOSIS — N89.8 VAGINAL DISCHARGE: ICD-10-CM

## 2024-09-25 NOTE — PROGRESS NOTES
Subjective:     Pema Hines is a 23 y.o. female who presents for Sore Throat (X 3 day with white patch, fatigue, nasal congestion, chills. )      HPI  Pt presents for evaluation of a new problem. Alexandra is a very pleasant 23-year-old female presents to urgent care today with complaints of a sore throat that has been ongoing for the past 3 days.  She did note a white patch to her left tonsil yesterday.  Associated symptoms include congestion, fatigue and chills.  Additionally, she is also complaining of dysuria and abnormal discharge.  She is concerned that she may be suffering from a yeast infection.  No treatment has been used for her symptoms.    ROS    PMH:   Past Medical History:   Diagnosis Date    Allergic rhinitis     ASTHMA 8/15/2013    inhaler as needed    Bowel habit changes     diarrhea    Depression     Gynecological disorder     severe cramps and cysts    History of COVID-19     tested (+) 01-, States symptoms resolved by 01-    IBS (irritable bowel syndrome)     Menarche 8/15/2013    Started at 10. Fairly regular.    Nausea, vomiting and diarrhea 11/19/2014    Pain 03/09/2022    right ankle, 5-6/10    Snoring     no sleep study     ALLERGIES: No Known Allergies  SURGHX:   Past Surgical History:   Procedure Laterality Date    PB OPEN TX DISTAL TIBIOFIBULAR JOINT DISRUPTION Right 3/15/2022    Procedure: RIGHT OPEN REDUCTION INTERNAL FIXATION FIBULA NONUNION, SYNDESMOSIS REPAIR, ANKLE ARTHROSCOPY;  Surgeon: Erwin Smith M.D.;  Location: [facility];  Service: Orthopedics    TN UNLISTED PROC, ARTHROSCOPY Right 3/15/2022    Procedure: ARTHROSCOPY, ANKLE;  Surgeon: Erwin Smith M.D.;  Location: [facility];  Service: Orthopedics    SOHAM BY LAPAROSCOPY N/A 12/17/2015    Procedure: SHOAM BY LAPAROSCOPY ;  Surgeon: Yuliet Dale M.D.;  Location: [facility];  Service:     COLONOSCOPY  10/2015    ENDOSCOPY  08/2015    DENTAL EXTRACTION(S)    "   wisdom teeth     SOCHX:   Social History     Socioeconomic History    Marital status: Single   Tobacco Use    Smoking status: Never    Smokeless tobacco: Never   Vaping Use    Vaping status: Never Used   Substance and Sexual Activity    Alcohol use: Yes     Alcohol/week: 0.0 oz     Comment: reports 1/month    Drug use: Not Currently    Sexual activity: Yes   Social History Narrative    Lives with mother.  Has sister.  4th grade.      FH:   Family History   Adopted: Yes   Problem Relation Age of Onset    Other Mother         adopted    Other Father         adopted         Objective:   /78   Pulse 91   Temp 36.8 °C (98.2 °F) (Temporal)   Resp 16   Ht 1.702 m (5' 7\")   Wt 88.5 kg (195 lb)   LMP 09/05/2024   SpO2 97%   BMI 30.54 kg/m²     Physical Exam  Vitals and nursing note reviewed.   Constitutional:       General: She is not in acute distress.     Appearance: Normal appearance. She is ill-appearing.   HENT:      Head: Normocephalic and atraumatic.      Right Ear: Tympanic membrane, ear canal and external ear normal.      Left Ear: Tympanic membrane, ear canal and external ear normal.      Nose: Congestion present. No rhinorrhea.      Mouth/Throat:      Mouth: Mucous membranes are moist.      Pharynx: Uvula midline. Pharyngeal swelling and posterior oropharyngeal erythema present. No oropharyngeal exudate or uvula swelling.      Tonsils: No tonsillar exudate or tonsillar abscesses. 2+ on the right. 2+ on the left.   Eyes:      Extraocular Movements: Extraocular movements intact.      Pupils: Pupils are equal, round, and reactive to light.   Cardiovascular:      Rate and Rhythm: Normal rate and regular rhythm.      Pulses: Normal pulses.      Heart sounds: Normal heart sounds.   Pulmonary:      Effort: Pulmonary effort is normal.      Breath sounds: Normal breath sounds.   Abdominal:      General: Abdomen is flat. Bowel sounds are normal.      Palpations: Abdomen is soft.      Tenderness: There is " abdominal tenderness in the suprapubic area. There is no right CVA tenderness, left CVA tenderness, guarding or rebound.   Musculoskeletal:         General: Normal range of motion.      Cervical back: Normal range of motion and neck supple.   Skin:     General: Skin is warm and dry.      Capillary Refill: Capillary refill takes less than 2 seconds.   Neurological:      General: No focal deficit present.      Mental Status: She is alert and oriented to person, place, and time. Mental status is at baseline.   Psychiatric:         Mood and Affect: Mood normal.         Behavior: Behavior normal.         Thought Content: Thought content normal.         Judgment: Judgment normal.         Assessment/Plan:   Assessment    1. Sore throat  POCT Cepheid Group A Strep - PCR    POCT CoV-2, Flu A/B, RSV by PCR    CULTURE THROAT    dexamethasone (Decadron) injection (check route below) 10 mg      2. Dysuria  POCT Urinalysis      3. Vaginal discharge  POCT Urinalysis    VAGINAL PATHOGENS DNA PANEL      4. Acute cystitis with hematuria  URINE CULTURE(NEW)    cefdinir (OMNICEF) 300 MG Cap    DISCONTINUED: cefdinir (OMNICEF) 300 MG Cap        Urinalysis is concerning for UTI.  Urine was sent for culture as well as vaginal pathogen.  I will notify her of results.  She was empirically started on cefdinir for treatment of UTI.  Strep test negative in clinic as well as COVID, flu and RSV.  Upper respiratory tract infection is likely viral.  Throat culture however was also sent to lab for evaluation of atypical bacteria.

## 2024-09-26 DIAGNOSIS — B37.31 VAGINAL CANDIDIASIS: ICD-10-CM

## 2024-09-26 LAB
CANDIDA DNA VAG QL PROBE+SIG AMP: POSITIVE
G VAGINALIS DNA VAG QL PROBE+SIG AMP: NEGATIVE
T VAGINALIS DNA VAG QL PROBE+SIG AMP: NEGATIVE

## 2024-09-26 RX ORDER — FLUCONAZOLE 150 MG/1
TABLET ORAL
Qty: 2 TABLET | Refills: 0 | Status: SHIPPED | OUTPATIENT
Start: 2024-09-26

## 2024-09-29 LAB
BACTERIA UR CULT: ABNORMAL
SIGNIFICANT IND 70042: ABNORMAL
SITE SITE: ABNORMAL
SOURCE SOURCE: ABNORMAL

## 2024-10-07 ENCOUNTER — TELEPHONE (OUTPATIENT)
Dept: MEDICAL GROUP | Facility: LAB | Age: 24
End: 2024-10-07
Payer: COMMERCIAL

## 2024-10-07 DIAGNOSIS — N94.6 DYSMENORRHEA: ICD-10-CM

## 2024-10-07 DIAGNOSIS — O03.9 MISCARRIAGE: ICD-10-CM

## 2024-10-11 ENCOUNTER — HOSPITAL ENCOUNTER (OUTPATIENT)
Dept: LAB | Facility: MEDICAL CENTER | Age: 24
End: 2024-10-11
Attending: INTERNAL MEDICINE
Payer: COMMERCIAL

## 2024-10-11 DIAGNOSIS — N94.6 DYSMENORRHEA: ICD-10-CM

## 2024-10-11 DIAGNOSIS — O03.9 MISCARRIAGE: ICD-10-CM

## 2024-10-11 LAB
ALBUMIN SERPL BCP-MCNC: 4.3 G/DL (ref 3.2–4.9)
ALBUMIN/GLOB SERPL: 1.6 G/DL
ALP SERPL-CCNC: 74 U/L (ref 30–99)
ALT SERPL-CCNC: 54 U/L (ref 2–50)
ANION GAP SERPL CALC-SCNC: 13 MMOL/L (ref 7–16)
AST SERPL-CCNC: 19 U/L (ref 12–45)
BASOPHILS # BLD AUTO: 0.3 % (ref 0–1.8)
BASOPHILS # BLD: 0.02 K/UL (ref 0–0.12)
BILIRUB SERPL-MCNC: 0.4 MG/DL (ref 0.1–1.5)
BUN SERPL-MCNC: 13 MG/DL (ref 8–22)
CALCIUM ALBUM COR SERPL-MCNC: 9.2 MG/DL (ref 8.5–10.5)
CALCIUM SERPL-MCNC: 9.4 MG/DL (ref 8.5–10.5)
CHLORIDE SERPL-SCNC: 103 MMOL/L (ref 96–112)
CO2 SERPL-SCNC: 24 MMOL/L (ref 20–33)
CREAT SERPL-MCNC: 0.64 MG/DL (ref 0.5–1.4)
EOSINOPHIL # BLD AUTO: 0.18 K/UL (ref 0–0.51)
EOSINOPHIL NFR BLD: 2.3 % (ref 0–6.9)
ERYTHROCYTE [DISTWIDTH] IN BLOOD BY AUTOMATED COUNT: 40 FL (ref 35.9–50)
GFR SERPLBLD CREATININE-BSD FMLA CKD-EPI: 126 ML/MIN/1.73 M 2
GLOBULIN SER CALC-MCNC: 2.7 G/DL (ref 1.9–3.5)
GLUCOSE SERPL-MCNC: 118 MG/DL (ref 65–99)
HCT VFR BLD AUTO: 41.2 % (ref 37–47)
HGB BLD-MCNC: 13.3 G/DL (ref 12–16)
IMM GRANULOCYTES # BLD AUTO: 0.04 K/UL (ref 0–0.11)
IMM GRANULOCYTES NFR BLD AUTO: 0.5 % (ref 0–0.9)
LYMPHOCYTES # BLD AUTO: 2.33 K/UL (ref 1–4.8)
LYMPHOCYTES NFR BLD: 29.7 % (ref 22–41)
MCH RBC QN AUTO: 29 PG (ref 27–33)
MCHC RBC AUTO-ENTMCNC: 32.3 G/DL (ref 32.2–35.5)
MCV RBC AUTO: 90 FL (ref 81.4–97.8)
MONOCYTES # BLD AUTO: 0.44 K/UL (ref 0–0.85)
MONOCYTES NFR BLD AUTO: 5.6 % (ref 0–13.4)
NEUTROPHILS # BLD AUTO: 4.83 K/UL (ref 1.82–7.42)
NEUTROPHILS NFR BLD: 61.6 % (ref 44–72)
NRBC # BLD AUTO: 0 K/UL
NRBC BLD-RTO: 0 /100 WBC (ref 0–0.2)
PLATELET # BLD AUTO: 286 K/UL (ref 164–446)
PMV BLD AUTO: 12.5 FL (ref 9–12.9)
POTASSIUM SERPL-SCNC: 3.8 MMOL/L (ref 3.6–5.5)
PROT SERPL-MCNC: 7 G/DL (ref 6–8.2)
RBC # BLD AUTO: 4.58 M/UL (ref 4.2–5.4)
SODIUM SERPL-SCNC: 140 MMOL/L (ref 135–145)
WBC # BLD AUTO: 7.8 K/UL (ref 4.8–10.8)

## 2024-10-11 PROCEDURE — 80053 COMPREHEN METABOLIC PANEL: CPT

## 2024-10-11 PROCEDURE — 85025 COMPLETE CBC W/AUTO DIFF WBC: CPT

## 2024-10-11 PROCEDURE — 36415 COLL VENOUS BLD VENIPUNCTURE: CPT

## 2024-10-29 ENCOUNTER — OFFICE VISIT (OUTPATIENT)
Dept: MEDICAL GROUP | Facility: LAB | Age: 24
End: 2024-10-29
Payer: COMMERCIAL

## 2024-10-29 VITALS
TEMPERATURE: 97.8 F | HEART RATE: 78 BPM | DIASTOLIC BLOOD PRESSURE: 80 MMHG | WEIGHT: 194 LBS | SYSTOLIC BLOOD PRESSURE: 118 MMHG | HEIGHT: 67 IN | RESPIRATION RATE: 16 BRPM | BODY MASS INDEX: 30.45 KG/M2 | OXYGEN SATURATION: 99 %

## 2024-10-29 DIAGNOSIS — Z87.59 HISTORY OF MISCARRIAGE: ICD-10-CM

## 2024-10-29 DIAGNOSIS — R79.89 ELEVATED LFTS: ICD-10-CM

## 2024-10-29 DIAGNOSIS — M54.50 LOW BACK PAIN, UNSPECIFIED BACK PAIN LATERALITY, UNSPECIFIED CHRONICITY, UNSPECIFIED WHETHER SCIATICA PRESENT: ICD-10-CM

## 2024-10-29 DIAGNOSIS — R89.9 ABNORMAL LABORATORY TEST: ICD-10-CM

## 2024-10-29 DIAGNOSIS — M25.50 ARTHRALGIA, UNSPECIFIED JOINT: ICD-10-CM

## 2024-10-29 ASSESSMENT — FIBROSIS 4 INDEX: FIB4 SCORE: 0.22

## 2024-11-05 ENCOUNTER — HOSPITAL ENCOUNTER (OUTPATIENT)
Dept: LAB | Facility: MEDICAL CENTER | Age: 24
End: 2024-11-05
Attending: INTERNAL MEDICINE
Payer: COMMERCIAL

## 2024-11-05 DIAGNOSIS — M25.50 ARTHRALGIA, UNSPECIFIED JOINT: ICD-10-CM

## 2024-11-05 DIAGNOSIS — M54.50 LOW BACK PAIN, UNSPECIFIED BACK PAIN LATERALITY, UNSPECIFIED CHRONICITY, UNSPECIFIED WHETHER SCIATICA PRESENT: ICD-10-CM

## 2024-11-05 DIAGNOSIS — R89.9 ABNORMAL LABORATORY TEST: ICD-10-CM

## 2024-11-05 DIAGNOSIS — Z87.59 HISTORY OF MISCARRIAGE: ICD-10-CM

## 2024-11-05 DIAGNOSIS — R79.89 ELEVATED LFTS: ICD-10-CM

## 2024-11-05 LAB — ERYTHROCYTE [SEDIMENTATION RATE] IN BLOOD BY WESTERGREN METHOD: 12 MM/HOUR (ref 0–25)

## 2024-11-05 PROCEDURE — 82784 ASSAY IGA/IGD/IGG/IGM EACH: CPT

## 2024-11-05 PROCEDURE — 86140 C-REACTIVE PROTEIN: CPT

## 2024-11-05 PROCEDURE — 36415 COLL VENOUS BLD VENIPUNCTURE: CPT

## 2024-11-05 PROCEDURE — 85652 RBC SED RATE AUTOMATED: CPT

## 2024-11-05 PROCEDURE — 80053 COMPREHEN METABOLIC PANEL: CPT

## 2024-11-05 PROCEDURE — 80074 ACUTE HEPATITIS PANEL: CPT

## 2024-11-05 PROCEDURE — 84703 CHORIONIC GONADOTROPIN ASSAY: CPT

## 2024-11-06 LAB
ALBUMIN SERPL BCP-MCNC: 4.3 G/DL (ref 3.2–4.9)
ALBUMIN/GLOB SERPL: 1.5 G/DL
ALP SERPL-CCNC: 58 U/L (ref 30–99)
ALT SERPL-CCNC: 13 U/L (ref 2–50)
ANION GAP SERPL CALC-SCNC: 11 MMOL/L (ref 7–16)
AST SERPL-CCNC: 16 U/L (ref 12–45)
BILIRUB SERPL-MCNC: 0.4 MG/DL (ref 0.1–1.5)
BUN SERPL-MCNC: 8 MG/DL (ref 8–22)
CALCIUM ALBUM COR SERPL-MCNC: 9.4 MG/DL (ref 8.5–10.5)
CALCIUM SERPL-MCNC: 9.6 MG/DL (ref 8.5–10.5)
CHLORIDE SERPL-SCNC: 108 MMOL/L (ref 96–112)
CO2 SERPL-SCNC: 23 MMOL/L (ref 20–33)
CREAT SERPL-MCNC: 0.83 MG/DL (ref 0.5–1.4)
CRP SERPL HS-MCNC: 0.52 MG/DL (ref 0–0.75)
GFR SERPLBLD CREATININE-BSD FMLA CKD-EPI: 100 ML/MIN/1.73 M 2
GLOBULIN SER CALC-MCNC: 2.8 G/DL (ref 1.9–3.5)
GLUCOSE SERPL-MCNC: 82 MG/DL (ref 65–99)
HAV IGM SERPL QL IA: NONREACTIVE
HBV CORE IGM SER QL: NONREACTIVE
HBV SURFACE AG SER QL: NONREACTIVE
HCG SERPL QL: NEGATIVE
HCV AB SER QL: NONREACTIVE
POTASSIUM SERPL-SCNC: 3.8 MMOL/L (ref 3.6–5.5)
PROT SERPL-MCNC: 7.1 G/DL (ref 6–8.2)
SODIUM SERPL-SCNC: 142 MMOL/L (ref 135–145)

## 2024-11-07 LAB
IGA SERPL-MCNC: 174 MG/DL (ref 68–408)
IGG SERPL-MCNC: 938 MG/DL (ref 768–1632)
IGM SERPL-MCNC: 134 MG/DL (ref 35–263)

## 2024-12-09 ENCOUNTER — APPOINTMENT (OUTPATIENT)
Dept: MEDICAL GROUP | Facility: LAB | Age: 24
End: 2024-12-09
Payer: COMMERCIAL

## 2024-12-15 ENCOUNTER — HOSPITAL ENCOUNTER (EMERGENCY)
Facility: MEDICAL CENTER | Age: 24
End: 2024-12-16
Attending: EMERGENCY MEDICINE
Payer: COMMERCIAL

## 2024-12-15 DIAGNOSIS — R11.0 NAUSEA: ICD-10-CM

## 2024-12-15 DIAGNOSIS — R10.84 GENERALIZED ABDOMINAL PAIN: Primary | ICD-10-CM

## 2024-12-15 LAB
APPEARANCE UR: CLEAR
BACTERIA #/AREA URNS HPF: ABNORMAL /HPF
BASOPHILS # BLD AUTO: 0.3 % (ref 0–1.8)
BASOPHILS # BLD: 0.02 K/UL (ref 0–0.12)
BILIRUB UR QL STRIP.AUTO: NEGATIVE
CASTS URNS QL MICRO: ABNORMAL /LPF (ref 0–2)
COLOR UR: YELLOW
EOSINOPHIL # BLD AUTO: 0 K/UL (ref 0–0.51)
EOSINOPHIL NFR BLD: 0 % (ref 0–6.9)
EPITHELIAL CELLS 1715: ABNORMAL /HPF (ref 0–5)
ERYTHROCYTE [DISTWIDTH] IN BLOOD BY AUTOMATED COUNT: 40.3 FL (ref 35.9–50)
GLUCOSE UR STRIP.AUTO-MCNC: NEGATIVE MG/DL
HCT VFR BLD AUTO: 39.8 % (ref 37–47)
HGB BLD-MCNC: 13.4 G/DL (ref 12–16)
IMM GRANULOCYTES # BLD AUTO: 0.02 K/UL (ref 0–0.11)
IMM GRANULOCYTES NFR BLD AUTO: 0.3 % (ref 0–0.9)
KETONES UR STRIP.AUTO-MCNC: NEGATIVE MG/DL
LEUKOCYTE ESTERASE UR QL STRIP.AUTO: ABNORMAL
LYMPHOCYTES # BLD AUTO: 0.43 K/UL (ref 1–4.8)
LYMPHOCYTES NFR BLD: 6.4 % (ref 22–41)
MCH RBC QN AUTO: 29.9 PG (ref 27–33)
MCHC RBC AUTO-ENTMCNC: 33.7 G/DL (ref 32.2–35.5)
MCV RBC AUTO: 88.8 FL (ref 81.4–97.8)
MICRO URNS: ABNORMAL
MONOCYTES # BLD AUTO: 0.63 K/UL (ref 0–0.85)
MONOCYTES NFR BLD AUTO: 9.3 % (ref 0–13.4)
NEUTROPHILS # BLD AUTO: 5.65 K/UL (ref 1.82–7.42)
NEUTROPHILS NFR BLD: 83.7 % (ref 44–72)
NITRITE UR QL STRIP.AUTO: NEGATIVE
NRBC # BLD AUTO: 0 K/UL
NRBC BLD-RTO: 0 /100 WBC (ref 0–0.2)
PH UR STRIP.AUTO: 8 [PH] (ref 5–8)
PLATELET # BLD AUTO: 207 K/UL (ref 164–446)
PMV BLD AUTO: 12.5 FL (ref 9–12.9)
PROT UR QL STRIP: NEGATIVE MG/DL
RBC # BLD AUTO: 4.48 M/UL (ref 4.2–5.4)
RBC # URNS HPF: ABNORMAL /HPF (ref 0–2)
RBC UR QL AUTO: NEGATIVE
SP GR UR STRIP.AUTO: 1.01
UROBILINOGEN UR STRIP.AUTO-MCNC: 0.2 EU/DL
WBC # BLD AUTO: 6.8 K/UL (ref 4.8–10.8)
WBC #/AREA URNS HPF: ABNORMAL /HPF

## 2024-12-15 PROCEDURE — 80053 COMPREHEN METABOLIC PANEL: CPT

## 2024-12-15 PROCEDURE — 96375 TX/PRO/DX INJ NEW DRUG ADDON: CPT

## 2024-12-15 PROCEDURE — 87077 CULTURE AEROBIC IDENTIFY: CPT

## 2024-12-15 PROCEDURE — 85025 COMPLETE CBC W/AUTO DIFF WBC: CPT

## 2024-12-15 PROCEDURE — 99284 EMERGENCY DEPT VISIT MOD MDM: CPT

## 2024-12-15 PROCEDURE — 700111 HCHG RX REV CODE 636 W/ 250 OVERRIDE (IP): Mod: JZ | Performed by: EMERGENCY MEDICINE

## 2024-12-15 PROCEDURE — 96374 THER/PROPH/DIAG INJ IV PUSH: CPT

## 2024-12-15 PROCEDURE — 83690 ASSAY OF LIPASE: CPT

## 2024-12-15 PROCEDURE — 87086 URINE CULTURE/COLONY COUNT: CPT

## 2024-12-15 PROCEDURE — 84703 CHORIONIC GONADOTROPIN ASSAY: CPT

## 2024-12-15 PROCEDURE — 36415 COLL VENOUS BLD VENIPUNCTURE: CPT

## 2024-12-15 PROCEDURE — 81001 URINALYSIS AUTO W/SCOPE: CPT

## 2024-12-15 RX ORDER — ONDANSETRON 2 MG/ML
4 INJECTION INTRAMUSCULAR; INTRAVENOUS ONCE
Status: COMPLETED | OUTPATIENT
Start: 2024-12-15 | End: 2024-12-15

## 2024-12-15 RX ORDER — KETOROLAC TROMETHAMINE 15 MG/ML
15 INJECTION, SOLUTION INTRAMUSCULAR; INTRAVENOUS ONCE
Status: COMPLETED | OUTPATIENT
Start: 2024-12-15 | End: 2024-12-15

## 2024-12-15 RX ADMIN — KETOROLAC TROMETHAMINE 15 MG: 15 INJECTION, SOLUTION INTRAMUSCULAR; INTRAVENOUS at 23:24

## 2024-12-15 RX ADMIN — ONDANSETRON 4 MG: 2 INJECTION INTRAMUSCULAR; INTRAVENOUS at 23:25

## 2024-12-15 ASSESSMENT — PAIN DESCRIPTION - PAIN TYPE
TYPE: ACUTE PAIN
TYPE: ACUTE PAIN

## 2024-12-15 ASSESSMENT — FIBROSIS 4 INDEX: FIB4 SCORE: 0.37

## 2024-12-15 NOTE — Clinical Note
Pema Hines was seen and treated in our emergency department on 12/15/2024.  She may return to work on 12/17/2024.       If you have any questions or concerns, please don't hesitate to call.      Tian Dai M.D.

## 2024-12-16 ENCOUNTER — APPOINTMENT (OUTPATIENT)
Dept: RADIOLOGY | Facility: MEDICAL CENTER | Age: 24
End: 2024-12-16
Attending: EMERGENCY MEDICINE
Payer: COMMERCIAL

## 2024-12-16 VITALS
BODY MASS INDEX: 31.56 KG/M2 | DIASTOLIC BLOOD PRESSURE: 56 MMHG | OXYGEN SATURATION: 97 % | SYSTOLIC BLOOD PRESSURE: 103 MMHG | RESPIRATION RATE: 16 BRPM | WEIGHT: 201.06 LBS | HEIGHT: 67 IN | HEART RATE: 78 BPM | TEMPERATURE: 98.9 F

## 2024-12-16 LAB
ALBUMIN SERPL BCP-MCNC: 4.6 G/DL (ref 3.2–4.9)
ALBUMIN/GLOB SERPL: 1.7 G/DL
ALP SERPL-CCNC: 61 U/L (ref 30–99)
ALT SERPL-CCNC: 13 U/L (ref 2–50)
ANION GAP SERPL CALC-SCNC: 13 MMOL/L (ref 7–16)
AST SERPL-CCNC: 18 U/L (ref 12–45)
BILIRUB SERPL-MCNC: 0.3 MG/DL (ref 0.1–1.5)
BUN SERPL-MCNC: 9 MG/DL (ref 8–22)
CALCIUM ALBUM COR SERPL-MCNC: 8.9 MG/DL (ref 8.5–10.5)
CALCIUM SERPL-MCNC: 9.4 MG/DL (ref 8.5–10.5)
CHLORIDE SERPL-SCNC: 104 MMOL/L (ref 96–112)
CO2 SERPL-SCNC: 21 MMOL/L (ref 20–33)
CREAT SERPL-MCNC: 0.78 MG/DL (ref 0.5–1.4)
GFR SERPLBLD CREATININE-BSD FMLA CKD-EPI: 108 ML/MIN/1.73 M 2
GLOBULIN SER CALC-MCNC: 2.7 G/DL (ref 1.9–3.5)
GLUCOSE SERPL-MCNC: 100 MG/DL (ref 65–99)
HCG SERPL QL: NEGATIVE
LIPASE SERPL-CCNC: 16 U/L (ref 11–82)
POTASSIUM SERPL-SCNC: 3.4 MMOL/L (ref 3.6–5.5)
PROT SERPL-MCNC: 7.3 G/DL (ref 6–8.2)
S PYO DNA SPEC NAA+PROBE: NOT DETECTED
SODIUM SERPL-SCNC: 138 MMOL/L (ref 135–145)

## 2024-12-16 PROCEDURE — 700111 HCHG RX REV CODE 636 W/ 250 OVERRIDE (IP): Performed by: EMERGENCY MEDICINE

## 2024-12-16 PROCEDURE — 700117 HCHG RX CONTRAST REV CODE 255: Performed by: EMERGENCY MEDICINE

## 2024-12-16 PROCEDURE — 96375 TX/PRO/DX INJ NEW DRUG ADDON: CPT

## 2024-12-16 PROCEDURE — 74177 CT ABD & PELVIS W/CONTRAST: CPT

## 2024-12-16 PROCEDURE — 87651 STREP A DNA AMP PROBE: CPT

## 2024-12-16 RX ORDER — MORPHINE SULFATE 4 MG/ML
4 INJECTION INTRAVENOUS ONCE
Status: COMPLETED | OUTPATIENT
Start: 2024-12-16 | End: 2024-12-16

## 2024-12-16 RX ADMIN — IOHEXOL 100 ML: 350 INJECTION, SOLUTION INTRAVENOUS at 01:45

## 2024-12-16 RX ADMIN — MORPHINE SULFATE 4 MG: 4 INJECTION INTRAVENOUS at 00:57

## 2024-12-16 ASSESSMENT — PAIN DESCRIPTION - PAIN TYPE
TYPE: ACUTE PAIN
TYPE: ACUTE PAIN

## 2024-12-16 NOTE — ED NOTES
Discharge instructions given and discussed, signed copy in chart. Pt verbalized understanding and all questions answered. Pt discharged home in stable condition. Personal belongings with patient. IV removed and tolerated well.

## 2024-12-16 NOTE — ED NOTES
Pt brought to room YEL 54 from triage.  Patient ambulated under own power without difficulty.  V/S obtained and monitor applied.  ERP to see.

## 2024-12-16 NOTE — PROGRESS NOTES
"ED Observation Progress Note    Date of Service: 12/16/24    Interval History and Interventions  Patient signed out to me by colleague pending CT of the abdomen and pelvis.  Patient presented with abdominal pain which she has had previously, had largely negative workup at this time.  Tolerating orals.  CT scan does not show any evidence of acute abnormality.  Patient was reassessed, tolerating orals, remained hemodynamically stable.  Patient will be discharged home, states that she is going to take anti-inflammatories at home, urine was sent for culture.    Physical Exam  /56   Pulse 78   Temp 37.7 °C (99.9 °F) (Oral)   Resp 16   Ht 1.702 m (5' 7\")   Wt 91.2 kg (201 lb 1 oz)   LMP 11/30/2024 (Approximate)   SpO2 97%   BMI 31.49 kg/m² .    Constitutional: Awake and alert. Nontoxic  HENT:  Grossly normal  Eyes: Grossly normal  Neck: Normal range of motion  Cardiovascular: Normal heart rate   Thorax & Lungs: No respiratory distress  Abdomen: Nontender  Skin:  No pathologic rash.   Extremities: Well perfused  Psychiatric: Affect normal    Labs  Results for orders placed or performed during the hospital encounter of 12/15/24   URINALYSIS,CULTURE IF INDICATED    Collection Time: 12/15/24 10:45 PM    Specimen: Blood   Result Value Ref Range    Color Yellow     Character Clear     Specific Gravity 1.006 <1.035    Ph 8.0 5.0 - 8.0    Glucose Negative Negative mg/dL    Ketones Negative Negative mg/dL    Protein Negative Negative mg/dL    Bilirubin Negative Negative    Urobilinogen, Urine 0.2 <=1.0 EU/dL    Nitrite Negative Negative    Leukocyte Esterase Moderate (A) Negative    Occult Blood Negative Negative    Micro Urine Req Microscopic    URINE MICROSCOPIC (W/UA)    Collection Time: 12/15/24 10:45 PM   Result Value Ref Range    WBC 11-20 (A) /hpf    RBC 0-2 0 - 2 /hpf    Bacteria None Seen None /hpf    Epithelial Cells 3-5 0 - 5 /hpf    Urine Casts 0-2 0 - 2 /lpf   CBC WITH DIFFERENTIAL    Collection Time: " 12/15/24 11:30 PM   Result Value Ref Range    WBC 6.8 4.8 - 10.8 K/uL    RBC 4.48 4.20 - 5.40 M/uL    Hemoglobin 13.4 12.0 - 16.0 g/dL    Hematocrit 39.8 37.0 - 47.0 %    MCV 88.8 81.4 - 97.8 fL    MCH 29.9 27.0 - 33.0 pg    MCHC 33.7 32.2 - 35.5 g/dL    RDW 40.3 35.9 - 50.0 fL    Platelet Count 207 164 - 446 K/uL    MPV 12.5 9.0 - 12.9 fL    Neutrophils-Polys 83.70 (H) 44.00 - 72.00 %    Lymphocytes 6.40 (L) 22.00 - 41.00 %    Monocytes 9.30 0.00 - 13.40 %    Eosinophils 0.00 0.00 - 6.90 %    Basophils 0.30 0.00 - 1.80 %    Immature Granulocytes 0.30 0.00 - 0.90 %    Nucleated RBC 0.00 0.00 - 0.20 /100 WBC    Neutrophils (Absolute) 5.65 1.82 - 7.42 K/uL    Lymphs (Absolute) 0.43 (L) 1.00 - 4.80 K/uL    Monos (Absolute) 0.63 0.00 - 0.85 K/uL    Eos (Absolute) 0.00 0.00 - 0.51 K/uL    Baso (Absolute) 0.02 0.00 - 0.12 K/uL    Immature Granulocytes (abs) 0.02 0.00 - 0.11 K/uL    NRBC (Absolute) 0.00 K/uL   COMP METABOLIC PANEL    Collection Time: 12/15/24 11:30 PM   Result Value Ref Range    Sodium 138 135 - 145 mmol/L    Potassium 3.4 (L) 3.6 - 5.5 mmol/L    Chloride 104 96 - 112 mmol/L    Co2 21 20 - 33 mmol/L    Anion Gap 13.0 7.0 - 16.0    Glucose 100 (H) 65 - 99 mg/dL    Bun 9 8 - 22 mg/dL    Creatinine 0.78 0.50 - 1.40 mg/dL    Calcium 9.4 8.5 - 10.5 mg/dL    Correct Calcium 8.9 8.5 - 10.5 mg/dL    AST(SGOT) 18 12 - 45 U/L    ALT(SGPT) 13 2 - 50 U/L    Alkaline Phosphatase 61 30 - 99 U/L    Total Bilirubin 0.3 0.1 - 1.5 mg/dL    Albumin 4.6 3.2 - 4.9 g/dL    Total Protein 7.3 6.0 - 8.2 g/dL    Globulin 2.7 1.9 - 3.5 g/dL    A-G Ratio 1.7 g/dL   LIPASE    Collection Time: 12/15/24 11:30 PM   Result Value Ref Range    Lipase 16 11 - 82 U/L   HCG QUAL SERUM    Collection Time: 12/15/24 11:30 PM   Result Value Ref Range    Beta-Hcg Qualitative Serum Negative Negative   ESTIMATED GFR    Collection Time: 12/15/24 11:30 PM   Result Value Ref Range    GFR (CKD-EPI) 108 >60 mL/min/1.73 m 2   Group A Strep by PCR     Collection Time: 12/16/24  1:02 AM    Specimen: Throat   Result Value Ref Range    Group A Strep by PCR Not Detected Not Detected     *Note: Due to a large number of results and/or encounters for the requested time period, some results have not been displayed. A complete set of results can be found in Results Review.       Radiology  CT-ABDOMEN-PELVIS WITH   Final Result         1.  No acute intra-abdominal abnormality identified   2.  Hepatomegaly          Problem List  1.  Abdominal pain    Electronically signed by: Tian Dai M.D., 12/16/2024 2:38 AM

## 2024-12-16 NOTE — ED TRIAGE NOTES
Chief Complaint   Patient presents with    Abdominal Pain     Lower abdominal pain wrapping around to both sides of her back x 1 hour. Also reports fever. +N, (-)V. Hx kidney infections     Pt took 600 mg ibuprofen at 2100    Pt ambulatory to triage for above complaint. A&Ox4, GCS 15, speaking in full sentences. Respirations equal and unlabored. NAD.  Appropriate protocols ordered.   Pt educated on triage process and instructed to notify staff of worsening condition.   Pt placed in the lobby in stable condition.

## 2024-12-16 NOTE — ED PROVIDER NOTES
ED Provider Note    CHIEF COMPLAINT  Chief Complaint   Patient presents with    Abdominal Pain     Lower abdominal pain wrapping around to both sides of her back x 1 hour. Also reports fever. +N, (-)V. Hx kidney infections       EXTERNAL RECORDS REVIEWED  Outpatient office visit 10/29/2024 seen for follow-up of UTIs and elevated LFTs.  Outpatient labs 11/5/2020 24th shows normal CMP    HPI/ROS  LIMITATION TO HISTORY   Select: : None  OUTSIDE HISTORIAN(S):  None    Pema Alexandra Hines is a 23 y.o. female who presents to the ER for 1 week of lower abdominal pain that radiates to the back.  This is not quite like pain she has felt in the past.  She is not having her typical UTI symptoms.  Denies that this feels similar to any previous ovarian cyst that she has had in the past.  No vaginal bleeding or discharge.  Mild nausea but no vomiting.  Has not had a bowel movement since yesterday which she states is unusual for her.  No diarrhea.  No fevers or chills.  Has a history of cholecystectomy and used to see GI doctors, has had both colonoscopy and endoscopy in the past, no diagnosis ever given other than IBS    PAST MEDICAL HISTORY   has a past medical history of Allergic rhinitis, ASTHMA (8/15/2013), Bowel habit changes, Depression, Gynecological disorder, History of COVID-19, IBS (irritable bowel syndrome), Menarche (8/15/2013), Nausea, vomiting and diarrhea (11/19/2014), Pain (03/09/2022), and Snoring.    SURGICAL HISTORY   has a past surgical history that includes colonoscopy (10/2015); endoscopy (08/2015); melanie by laparoscopy (N/A, 12/17/2015); dental extraction(s); open tx distal tibiofibular joint disruption (Right, 3/15/2022); and unlisted proc, arthroscopy (Right, 3/15/2022).    FAMILY HISTORY  Family History   Adopted: Yes   Problem Relation Age of Onset    Other Mother         adopted    Other Father         adopted       SOCIAL HISTORY  Social History     Tobacco Use    Smoking status: Never     "Smokeless tobacco: Never   Vaping Use    Vaping status: Never Used   Substance and Sexual Activity    Alcohol use: Yes     Alcohol/week: 0.0 oz     Comment: reports 1/month    Drug use: Not Currently    Sexual activity: Yes       CURRENT MEDICATIONS  Home Medications    **Home medications have not yet been reviewed for this encounter**       Audit from Redirected Encounters    **Home medications have not yet been reviewed for this encounter**         ALLERGIES  Allergies   Allergen Reactions    Other Environmental Anaphylaxis, Nausea, Shortness of Breath and Vomiting       PHYSICAL EXAM  VITAL SIGNS: BP (!) 140/85   Pulse (!) 111   Temp 37.7 °C (99.9 °F) (Oral)   Resp 16   Ht 1.702 m (5' 7\")   Wt 91.2 kg (201 lb 1 oz)   LMP 11/30/2024 (Approximate)   SpO2 92%   BMI 31.49 kg/m²    General: Laying calmly in stretcher, no distress  HEENT: Moist mucous membranes, normal conjunctiva  CV: Regular rate and rhythm, no murmurs  Pulmonary: CTAB  Abdomen: Soft, mild epigastric tenderness, mild tenderness throughout the entire lower abdomen, mild bilateral CVA tenderness    EKG/LABS  CBC without leukocytosis, no anemia.  Lipase normal no pancreatitis.  Pregnancy negative.  CMP with mild hypokalemia 3.4.  No acute hepatobiliary abnormalities.  Urinalysis with moderate leukocyte esterase and 11-20 WBCs.        COURSE & MEDICAL DECISION MAKING    ASSESSMENT, COURSE AND PLAN  Care Narrative: Differential includes IBS, appendicitis, diverticulitis, colitis, enteritis, ovarian cyst, UTI, mesenteric adenitis, bowel obstruction     on arrival the patient is initially tachycardic and hypertensive however on my evaluation Heart rate had normalized.  She appears well-hydrated.  Has somewhat vague abdominal discomfort and is tender essentially throughout the abdomen and bilateral flanks.  Differential above considered.  No signs of peritonitis.  Patient states she has had many CT scans in her life so initially we decided to start " with laboratory workup.  Initially did not want narcotics will give Toradol and Zofran for symptoms.  Patient also reporting sore throat but no signs of pharyngitis on exam however states that she has had strep many times in her life so we will obtain the swab.    On review of labs, overall reassuring.  No significant leukocytosis to suggest obvious infectious process such as appendicitis or diverticulitis.  No evidence of pancreatitis or obstructive biliary pathology.  Mild hypokalemia 3.4.  No hematuria to suggest kidney stone.  Leukocyte esterase and white blood cells present however no bacteria.  She states she is not having UTI symptoms and wants this urine to go to culture prior to antibiotics.  Symptoms improved after Toradol and Zofran but still not quite feeling well enough to go home and she is requesting imaging at this time so we will obtain CT of the abdomen and pelvis.  Also requesting morphine at this time so given 4 mg morphine.  Patient was signed out to Dr. Dai pending results of strep swab and CT.  If CT does not show any acute surgical pathology patient will be discharged with PCP follow-up.  Any acute findings will be managed as deemed appropriate.  Strep swab will be followed up.  Signed out in stable condition        DISPOSITION AND DISCUSSIONS  I have discussed management of the patient with the following physicians and OLGA's: N/A    Discussion of management with other QHP or appropriate source(s): None         FINAL DIAGNOSIS  1. Generalized abdominal pain    2. Nausea         Electronically signed by: Usman Rodríguez M.D., 12/15/2024 10:40 PM

## 2024-12-17 ENCOUNTER — OFFICE VISIT (OUTPATIENT)
Dept: URGENT CARE | Facility: PHYSICIAN GROUP | Age: 24
End: 2024-12-17
Payer: COMMERCIAL

## 2024-12-17 VITALS
OXYGEN SATURATION: 96 % | HEART RATE: 88 BPM | DIASTOLIC BLOOD PRESSURE: 86 MMHG | SYSTOLIC BLOOD PRESSURE: 128 MMHG | BODY MASS INDEX: 31.08 KG/M2 | TEMPERATURE: 98 F | HEIGHT: 67 IN | WEIGHT: 198 LBS | RESPIRATION RATE: 16 BRPM

## 2024-12-17 DIAGNOSIS — G44.89 OTHER HEADACHE SYNDROME: ICD-10-CM

## 2024-12-17 DIAGNOSIS — R68.89 FLU-LIKE SYMPTOMS: ICD-10-CM

## 2024-12-17 DIAGNOSIS — U07.1 COVID: ICD-10-CM

## 2024-12-17 DIAGNOSIS — R50.9 FEVER AND CHILLS: ICD-10-CM

## 2024-12-17 DIAGNOSIS — M79.10 MYALGIA: ICD-10-CM

## 2024-12-17 LAB
FLUAV RNA SPEC QL NAA+PROBE: NEGATIVE
FLUBV RNA SPEC QL NAA+PROBE: NEGATIVE
RSV RNA SPEC QL NAA+PROBE: NEGATIVE
S PYO DNA SPEC NAA+PROBE: NOT DETECTED
SARS-COV-2 RNA RESP QL NAA+PROBE: POSITIVE

## 2024-12-17 PROCEDURE — 3074F SYST BP LT 130 MM HG: CPT | Performed by: NURSE PRACTITIONER

## 2024-12-17 PROCEDURE — 0241U POCT CEPHEID COV-2, FLU A/B, RSV - PCR: CPT | Performed by: NURSE PRACTITIONER

## 2024-12-17 PROCEDURE — 87651 STREP A DNA AMP PROBE: CPT | Performed by: NURSE PRACTITIONER

## 2024-12-17 PROCEDURE — 3079F DIAST BP 80-89 MM HG: CPT | Performed by: NURSE PRACTITIONER

## 2024-12-17 PROCEDURE — 99214 OFFICE O/P EST MOD 30 MIN: CPT | Performed by: NURSE PRACTITIONER

## 2024-12-17 ASSESSMENT — FIBROSIS 4 INDEX: FIB4 SCORE: 0.58

## 2024-12-17 NOTE — PROGRESS NOTES
"Subjective:   Pema Hines is a 23 y.o. female who presents for Pharyngitis (Sx 3 days ), Headache, Fever, and Chills       HPI  Patient is a pleasant 23 y.o. who presents for evaluation of 3-day history of sore throat, headache, myalgia, and fever with chills.  Patient has taken Tylenol with intermittent relief of her symptoms.  Patient reports recently traveling to [place] with potentially unknown contacts/exposures.    ROS  All other systems are negative except as documented above within HPI.    MEDS:   Current Outpatient Medications:     fluconazole (DIFLUCAN) 150 MG tablet, Take one tablet today and repeat dose in 72 hours for treatment of yeast infection. (Patient not taking: Reported on 12/17/2024), Disp: 2 Tablet, Rfl: 0    fluticasone (FLONASE) 50 MCG/ACT nasal spray, Administer 2 Sprays into affected nostril(S) at bedtime. (Patient not taking: Reported on 12/17/2024), Disp: 16 g, Rfl: 1  ALLERGIES:   Allergies   Allergen Reactions    Other Environmental Anaphylaxis, Nausea, Shortness of Breath and Vomiting       Patient's PMH, SocHx, SurgHx, FamHx, Drug allergies and medications were reviewed.     Objective:   /86   Pulse 88   Temp 36.7 °C (98 °F) (Temporal)   Resp 16   Ht 1.702 m (5' 7\")   Wt 89.8 kg (198 lb)   LMP 11/30/2024 (Approximate)   SpO2 96%   BMI 31.01 kg/m²     Physical Exam  Vitals and nursing note reviewed.   Constitutional:       General: She is awake.      Appearance: Normal appearance. She is well-developed.   HENT:      Head: Normocephalic and atraumatic.      Right Ear: Tympanic membrane, ear canal and external ear normal.      Left Ear: Tympanic membrane, ear canal and external ear normal.      Nose: Nose normal. No nasal tenderness, mucosal edema, congestion or rhinorrhea.      Right Turbinates: Enlarged.      Left Turbinates: Enlarged.      Mouth/Throat:      Lips: Pink.      Mouth: Mucous membranes are moist.      Pharynx: Oropharynx is clear. Uvula " midline. Posterior oropharyngeal erythema present.   Eyes:      Extraocular Movements: Extraocular movements intact.      Conjunctiva/sclera: Conjunctivae normal.   Cardiovascular:      Rate and Rhythm: Normal rate and regular rhythm.      Pulses: Normal pulses.      Heart sounds: Normal heart sounds.   Pulmonary:      Effort: Pulmonary effort is normal.      Breath sounds: Normal breath sounds.   Musculoskeletal:         General: Normal range of motion.      Cervical back: Normal range of motion and neck supple.   Skin:     General: Skin is warm and dry.   Neurological:      General: No focal deficit present.      Mental Status: She is alert and oriented to person, place, and time.   Psychiatric:         Mood and Affect: Mood normal.         Behavior: Behavior normal. Behavior is cooperative.         Thought Content: Thought content normal.         Judgment: Judgment normal.         Assessment/Plan:   Assessment      1. COVID    2. Flu-like symptoms  - POCT Cepheid Group A Strep - PCR  - POCT Cepheid CoV-2, Flu A/B, RSV - PCR    3. Myalgia    4. Other headache syndrome    5. Fever and chills      Vital signs stable at today's acute urgent care visit.  POCT testing positive for COVID.  Recommend over-the-counter cough and cold medication as needed for symptomatic relief.  Recommend isolation per CDC guidelines.  Work note provided.    Advised the patient to follow-up with the primary care provider if symptoms persist.  Red flags discussed and indications to immediately call 911 or present to the ED. All questions were encouraged and answered to the patient's satisfaction and understanding, and they agree to the plan of care.     This is an acute problem with uncertain prognosis, medication management and instructions as well as management options were provided.  I personally reviewed prior external notes and test results pertinent to today and independently reviewed and interpreted all diagnostics, to include POCT  testing. Time spent evaluating this patient includes preparing for visit, counseling/education, exam, evaluation, obtaining history, and ordering lab/test/procedures.      Please note that this dictation was created using voice recognition software. I have made a reasonable attempt to correct obvious errors, but I expect that there are errors of grammar and possibly content that I did not discover before finalizing the note.

## 2024-12-17 NOTE — LETTER
[facility] [address] [address]     December 17, 2024    Patient: Pema Hines   YOB: 2000   Date of Visit: 12/17/2024       To Whom It May Concern:    Pema Hines was seen and treated in our department on 12/17/2024.  Please excuse her from work on the dates of 12/16 to 12/18 due to an acute illness.    Sincerely,     ROSHNI Marinelli.

## 2024-12-18 ENCOUNTER — TELEPHONE (OUTPATIENT)
Dept: URGENT CARE | Facility: PHYSICIAN GROUP | Age: 24
End: 2024-12-18
Payer: COMMERCIAL

## 2024-12-19 NOTE — ED NOTES
"ED Positive Culture Follow-up/Notification Note:    Date: 12/18/2024     Patient seen in the ED on 12/15/2024 for lower abdominal pain that radiated to the back. She reported mild nausea but no vomiting. Patient denied vaginal bleeding or discharge, diarrhea, fevers, or chills. She stated she was not having her typical UTI symptoms. Physical exam was significant for mild epigastric tenderness, mild tenderness throughout the entire lower abdomen, and mild bilateral CVA tenderness. Serum beta hcg was negative. Chart review showed patient went to [facility] urgent care on 12/17 and tested positive for SARS-CoV-2.   1. Generalized abdominal pain Acute   2. Nausea Acute      Discharge Medication List as of 12/16/2024  2:44 AM        Allergies: Other environmental     Vitals:    12/15/24 2221 12/16/24 0010 12/16/24 0117 12/16/24 0236   BP:  115/64 111/61 103/56   Pulse:  85 82 78   Resp:    16   Temp:    37.2 °C (98.9 °F)   TempSrc:    Temporal   SpO2:  96% 95% 97%   Weight: 91.2 kg (201 lb 1 oz)      Height: 1.702 m (5' 7\")        Final cultures:   Results       Procedure Component Value Units Date/Time    URINE CULTURE(NEW) [[id number]]  (Abnormal) Collected: 12/15/24 2245    Order Status: Completed Specimen: Urine Updated: 12/18/24 0740     Significant Indicator POS     Source UR     Site -     Culture Result Usual urogenital laina ,000 cfu/mL      Streptococcus agalactiae (Group B)  ,000 cfu/mL      Group A Strep by PCR [[id number]] Collected: 12/16/24 0102    Order Status: Completed Specimen: Throat Updated: 12/16/24 0203     Group A Strep by PCR Not Detected    Group A Strep by PCR [[id number]]     Order Status: Canceled Specimen: Throat     URINALYSIS,CULTURE IF INDICATED [[id number]]  (Abnormal) Collected: 12/15/24 2245    Order Status: Completed Specimen: Blood Updated: 12/15/24 3219     Color Yellow     Character Clear     Specific Gravity 1.006     Ph 8.0     Glucose Negative mg/dL      Ketones Negative " mg/dL      Protein Negative mg/dL      Bilirubin Negative     Urobilinogen, Urine 0.2 EU/dL      Nitrite Negative     Leukocyte Esterase Moderate     Occult Blood Negative     Micro Urine Req Microscopic            Plan:   Patient's symptoms likely due to COVID-19 infection which was confirmed by PCR test at urgent care. The UA had 3-5 epithelial cells. Group B Streptococcus is a common colonizer of the GI/genital tract and its presence in the urine culture without specific UTI symptoms likely reflect colonization. Antibiotics are not indicated at this time.     Joyce Bullard, PharmD

## 2024-12-24 ENCOUNTER — PATIENT MESSAGE (OUTPATIENT)
Dept: MEDICAL GROUP | Facility: PHYSICIAN GROUP | Age: 24
End: 2024-12-24
Payer: COMMERCIAL

## 2024-12-31 ENCOUNTER — PATIENT MESSAGE (OUTPATIENT)
Dept: MEDICAL GROUP | Facility: LAB | Age: 24
End: 2024-12-31

## 2024-12-31 ENCOUNTER — OFFICE VISIT (OUTPATIENT)
Dept: MEDICAL GROUP | Facility: LAB | Age: 24
End: 2024-12-31
Payer: COMMERCIAL

## 2024-12-31 VITALS
HEART RATE: 53 BPM | SYSTOLIC BLOOD PRESSURE: 96 MMHG | WEIGHT: 199.96 LBS | OXYGEN SATURATION: 97 % | DIASTOLIC BLOOD PRESSURE: 68 MMHG | RESPIRATION RATE: 14 BRPM | TEMPERATURE: 97 F | BODY MASS INDEX: 31.38 KG/M2 | HEIGHT: 67 IN

## 2024-12-31 DIAGNOSIS — F32.A DEPRESSION, UNSPECIFIED DEPRESSION TYPE: ICD-10-CM

## 2024-12-31 DIAGNOSIS — Z30.011 ENCOUNTER FOR INITIAL PRESCRIPTION OF CONTRACEPTIVE PILLS: ICD-10-CM

## 2024-12-31 DIAGNOSIS — Z30.09 FAMILY PLANNING INITIATION: ICD-10-CM

## 2024-12-31 DIAGNOSIS — N30.00 ACUTE CYSTITIS WITHOUT HEMATURIA: ICD-10-CM

## 2024-12-31 LAB
POCT INT CON NEG: NEGATIVE
POCT INT CON POS: POSITIVE
POCT URINE PREGNANCY TEST: NEGATIVE

## 2024-12-31 RX ORDER — NORETHINDRONE ACETATE AND ETHINYL ESTRADIOL 1.5-30(21)
1 KIT ORAL DAILY
Qty: 28 TABLET | Refills: 11 | Status: SHIPPED | OUTPATIENT
Start: 2024-12-31

## 2024-12-31 RX ORDER — FLUOXETINE 10 MG/1
10 CAPSULE ORAL DAILY
Qty: 90 CAPSULE | Refills: 3 | Status: SHIPPED | OUTPATIENT
Start: 2024-12-31

## 2024-12-31 RX ORDER — CEFUROXIME AXETIL 500 MG/1
500 TABLET ORAL 2 TIMES DAILY
Qty: 10 TABLET | Refills: 0 | Status: SHIPPED | OUTPATIENT
Start: 2024-12-31

## 2024-12-31 ASSESSMENT — FIBROSIS 4 INDEX: FIB4 SCORE: 0.6

## 2024-12-31 ASSESSMENT — PATIENT HEALTH QUESTIONNAIRE - PHQ9
CLINICAL INTERPRETATION OF PHQ2 SCORE: 3
SUM OF ALL RESPONSES TO PHQ QUESTIONS 1-9: 14
5. POOR APPETITE OR OVEREATING: 2 - MORE THAN HALF THE DAYS

## 2024-12-31 NOTE — PROGRESS NOTES
CC: Pema Hines is a 24 y.o. female is suffering from   Chief Complaint   Patient presents with    Follow-Up         SUBJECTIVE:  1. Acute cystitis without hematuria  Patient is here for follow-up after being evaluated emergency room, we have discussed that she does have what appears to be a urinary tract infection.  This was not treated.    2. Depression, unspecified depression type  Depression, previously on Prozac at low-dose, have recommended she reinitiate treatment    3. Encounter for initial prescription of contraceptive pills  Patient is sexually active, I have discussed with her that she really should be on birth control        Past social, family, history: Boyfriend, Dominguez  Social History     Tobacco Use    Smoking status: Never    Smokeless tobacco: Never   Vaping Use    Vaping status: Never Used   Substance Use Topics    Alcohol use: Yes     Alcohol/week: 0.0 oz     Comment: reports 1/month    Drug use: Not Currently         MEDICATIONS:    Current Outpatient Medications:     cefUROXime (CEFTIN) 500 MG Tab, Take 1 Tablet by mouth 2 times a day., Disp: 10 Tablet, Rfl: 0    FLUoxetine (PROZAC) 10 MG Cap, Take 1 Capsule by mouth every day., Disp: 90 Capsule, Rfl: 3    fluconazole (DIFLUCAN) 150 MG tablet, Take one tablet today and repeat dose in 72 hours for treatment of yeast infection. (Patient not taking: Reported on 12/31/2024), Disp: 2 Tablet, Rfl: 0    fluticasone (FLONASE) 50 MCG/ACT nasal spray, Administer 2 Sprays into affected nostril(S) at bedtime. 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    Acute cystitis without hematuria 11/06/2020    Pain in both hands 06/30/2020    Rib pain 06/30/2020    Seasonal allergies  06/30/2020    Encounter for surveillance of contraceptives 04/02/2020    Plantar wart 04/02/2020    Acute swimmer's ear of both sides 07/30/2018    Nausea 05/22/2018    Dizziness 05/08/2018    Pharyngitis 04/12/2018    Bilateral hearing loss 04/12/2018    Pain in both wrists 01/16/2018    Chronic intractable headache 06/19/2017    Anxiety, generalized 06/19/2017    Irritable bowel syndrome 06/19/2017    Primary insomnia 10/19/2016    Mood changes 10/19/2016    Functional abdominal pain syndrome 06/02/2016    Cephalalgia 06/02/2016    Food allergy 04/21/2016    Seasonal allergic rhinitis 04/21/2016    Eczema 02/24/2016    Gastroesophageal reflux disease with esophagitis 02/24/2016    Abdominal cramps 12/17/2015    Vitamin D deficiency disease 05/29/2015    Asthma, mild intermittent, well-controlled 04/27/2015    Multiple joint pain 03/08/2015    Dysmenorrhea 03/08/2015    Irregular menstrual cycle 03/08/2015    Decreased hearing of right ear 09/28/2013       REVIEW OF SYSTEMS:  Gen.:  No Nausea, Vomiting, fever, Chills.  Heart: No chest pain.  Lungs:  No shortness of Breath.  Psychological: Pedro unusual Anxiety depression     PHYSICAL EXAM      Constitutional: Alert, cooperative, not in acute distress.  Cardiovascular:  Rate Rhythm is regular without murmurs rubs clicks.     Thorax & Lungs: Clear to auscultation, no wheezing, rhonchi, or rales  HENT: Normocephalic, Atraumatic.  Eyes: PERRLA, EOMI, Conjunctiva normal.   Neck: Trachia is midline no swelling of the thyroid.   Lymphatic: No lymphadenopathy noted.   Abdomin: Soft non-tender, no rebound, no guarding.   Neurologic: Alert & oriented x 3, cranial nerves II through XII are intact, Normal motor function, Normal sensory function, No focal deficits noted.   Psychiatric: Affect normal, Judgment normal, Mood normal.     VITAL SIGNS:BP 96/68 (BP Location: Left arm, Patient Position: Sitting, BP Cuff Size: Adult)   Pulse (!) 53   Temp 36.1 °C (97 °F) (Temporal)    "Resp 14   Ht 1.702 m (5' 7\")   Wt 90.7 kg (199 lb 15.3 oz)   LMP 12/31/2024 (Approximate)   SpO2 97%   BMI 31.32 kg/m²     Labs: Reviewed    Assessment:                                                     Plan:     1. Acute cystitis without hematuria  Patient with likely a urinary tract infection  - cefUROXime (CEFTIN) 500 MG Tab; Take 1 Tablet by mouth 2 times a day.  Dispense: 10 Tablet; Refill: 0    2. Depression, unspecified depression type  Restart fluoxetine  - FLUoxetine (PROZAC) 10 MG Cap; Take 1 Capsule by mouth every day.  Dispense: 90 Capsule; Refill: 3    3. Encounter for initial prescription of contraceptive pills  Urine pregnancy test ordered patient is to notify me regarding birth control she has used in the past  - POCT urine pregnancy docked device  - POCT Pregnancy      September 2024 andrea.   "

## 2025-01-15 ENCOUNTER — PATIENT MESSAGE (OUTPATIENT)
Dept: MEDICAL GROUP | Facility: LAB | Age: 25
End: 2025-01-15
Payer: COMMERCIAL

## 2025-01-15 DIAGNOSIS — N30.00 ACUTE CYSTITIS WITHOUT HEMATURIA: ICD-10-CM

## 2025-02-03 ENCOUNTER — HOSPITAL ENCOUNTER (EMERGENCY)
Facility: MEDICAL CENTER | Age: 25
End: 2025-02-03
Attending: STUDENT IN AN ORGANIZED HEALTH CARE EDUCATION/TRAINING PROGRAM
Payer: COMMERCIAL

## 2025-02-03 VITALS
OXYGEN SATURATION: 92 % | DIASTOLIC BLOOD PRESSURE: 94 MMHG | BODY MASS INDEX: 31.28 KG/M2 | HEART RATE: 66 BPM | WEIGHT: 199.3 LBS | TEMPERATURE: 97.3 F | HEIGHT: 67 IN | SYSTOLIC BLOOD PRESSURE: 146 MMHG | RESPIRATION RATE: 15 BRPM

## 2025-02-03 DIAGNOSIS — R30.0 DYSURIA: ICD-10-CM

## 2025-02-03 LAB
APPEARANCE UR: ABNORMAL
APPEARANCE UR: CLEAR
BACTERIA #/AREA URNS HPF: ABNORMAL /HPF
BACTERIA #/AREA URNS HPF: ABNORMAL /HPF
BILIRUB UR QL STRIP.AUTO: NEGATIVE
BILIRUB UR QL STRIP.AUTO: NEGATIVE
CASTS URNS QL MICRO: ABNORMAL /LPF (ref 0–2)
CASTS URNS QL MICRO: ABNORMAL /LPF (ref 0–2)
COLOR UR: YELLOW
COLOR UR: YELLOW
EPITHELIAL CELLS 1715: ABNORMAL /HPF (ref 0–5)
EPITHELIAL CELLS 1715: ABNORMAL /HPF (ref 0–5)
GLUCOSE UR STRIP.AUTO-MCNC: NEGATIVE MG/DL
GLUCOSE UR STRIP.AUTO-MCNC: NEGATIVE MG/DL
HCG UR QL: NEGATIVE
KETONES UR STRIP.AUTO-MCNC: ABNORMAL MG/DL
KETONES UR STRIP.AUTO-MCNC: NEGATIVE MG/DL
LEUKOCYTE ESTERASE UR QL STRIP.AUTO: ABNORMAL
LEUKOCYTE ESTERASE UR QL STRIP.AUTO: NEGATIVE
MICRO URNS: ABNORMAL
MICRO URNS: ABNORMAL
MUCOUS THREADS URNS QL MICRO: PRESENT /HPF
MUCOUS THREADS URNS QL MICRO: PRESENT /HPF
NITRITE UR QL STRIP.AUTO: NEGATIVE
NITRITE UR QL STRIP.AUTO: NEGATIVE
PH UR STRIP.AUTO: 5.5 [PH] (ref 5–8)
PH UR STRIP.AUTO: 5.5 [PH] (ref 5–8)
PROT UR QL STRIP: NEGATIVE MG/DL
PROT UR QL STRIP: NEGATIVE MG/DL
RBC # URNS HPF: ABNORMAL /HPF
RBC # URNS HPF: ABNORMAL /HPF
RBC UR QL AUTO: ABNORMAL
RBC UR QL AUTO: ABNORMAL
SP GR UR STRIP.AUTO: 1.01
SP GR UR STRIP.AUTO: 1.01
UROBILINOGEN UR STRIP.AUTO-MCNC: 0.2 EU/DL
UROBILINOGEN UR STRIP.AUTO-MCNC: 0.2 EU/DL
WBC #/AREA URNS HPF: ABNORMAL /HPF
WBC #/AREA URNS HPF: ABNORMAL /HPF

## 2025-02-03 PROCEDURE — 81025 URINE PREGNANCY TEST: CPT

## 2025-02-03 PROCEDURE — 81001 URINALYSIS AUTO W/SCOPE: CPT

## 2025-02-03 PROCEDURE — 87086 URINE CULTURE/COLONY COUNT: CPT

## 2025-02-03 PROCEDURE — 99284 EMERGENCY DEPT VISIT MOD MDM: CPT

## 2025-02-03 ASSESSMENT — FIBROSIS 4 INDEX: FIB4 SCORE: 0.6

## 2025-02-04 NOTE — DISCHARGE INSTRUCTIONS
Your urine culture is pending, urinalysis at this time is not convincing for infection, if the culture results positive, you will receive a phone call to start antibiotics.  Otherwise, follow-up with your primary care doctor, they may consider additional workup if you continue having urinary symptoms, ultimately potentially a referral to urology.  Return for vomiting severe worsening pain in your sides, other new concerning symptoms

## 2025-02-04 NOTE — ED PROVIDER NOTES
ED Provider Note    CHIEF COMPLAINT  Chief Complaint   Patient presents with    Blood in Urine     Started this am    Painful Urination     Started today       EXTERNAL RECORDS REVIEWED  Outpatient Notes 12/31/2024, diagnosed with acute cystitis, started on cefuroxime.  CT abdomen pelvis performed less than 2 months ago, no nephrolithiasis    HPI/ROS  LIMITATION TO HISTORY   Select: : None  OUTSIDE HISTORIAN(S):  Significant other is at bedside, helps provide history shows a picture on his phone of the blood    Pema Hines is a 24 y.o. female who presents with concern for urinary bleeding.  Patient reports that she went to the bathroom earlier this evening, and noticed a fair amount of blood in her underwear, and then a large clot.  She states she had a period a week ago, and is concerned is coming from her urethra.  She was just treated for a UTI, and still has some dysuria and frequency, no vomiting no flank pain fever or chills.  She also has chronic GI issues for which she follows up with gastroenterology, has been diagnosed with IBS, she states there has been no change in those.  She denies concern for pregnancy.  Denies significant abdominal pain.       On retrospect patient went to the restroom, wiped, and now believes she is having vaginal bleeding that she believes is likely menstruation.  PAST MEDICAL HISTORY   has a past medical history of Allergic rhinitis, ASTHMA (8/15/2013), Bowel habit changes, Depression, Gynecological disorder, History of COVID-19, IBS (irritable bowel syndrome), Menarche (8/15/2013), Nausea, vomiting and diarrhea (11/19/2014), Pain (03/09/2022), and Snoring.    SURGICAL HISTORY   has a past surgical history that includes colonoscopy (10/2015); endoscopy (08/2015); melanie by laparoscopy (N/A, 12/17/2015); dental extraction(s); open tx distal tibiofibular joint disruption (Right, 3/15/2022); and unlisted proc, arthroscopy (Right, 3/15/2022).    FAMILY HISTORY  Family  "History   Adopted: Yes   Problem Relation Age of Onset    Other Mother         adopted    Other Father         adopted       SOCIAL HISTORY  Social History     Tobacco Use    Smoking status: Never    Smokeless tobacco: Never   Vaping Use    Vaping status: Never Used   Substance and Sexual Activity    Alcohol use: Yes    Drug use: Not Currently    Sexual activity: Yes       CURRENT MEDICATIONS  Home Medications    **Home medications have not yet been reviewed for this encounter**         ALLERGIES  Allergies   Allergen Reactions    Other Environmental Anaphylaxis, Nausea, Shortness of Breath and Vomiting       PHYSICAL EXAM  VITAL SIGNS: BP (!) 146/94   Pulse 66   Temp 36.3 °C (97.3 °F) (Temporal)   Resp 15   Ht 1.702 m (5' 7\")   Wt 90.4 kg (199 lb 4.7 oz)   LMP 01/27/2025   SpO2 92%   BMI 31.21 kg/m²    General: Pleasant female, nontoxic-appearing alert, no acute distress.  Head: Normocephalic atraumatic  Eyes: Extraocular motion intact  Neck: Supple, no rigidity  Cardiovascular: Regular rate and rhythm no murmurs rubs or gallops  Respiratory: equal chest rise and fall, no increased work of breathing  Abdomen: Soft nontender no guarding no CVA tenderness  Musculoskeletal: Warm and well perfused, no peripheral edema  Neuro: Alert, no focal deficits  Integumentary: No wounds or rashes      EKG/LABS  Labs Reviewed   URINALYSIS,CULTURE IF INDICATED - Abnormal; Notable for the following components:       Result Value    Character Hazy (*)     Ketones Trace (*)     Leukocyte Esterase Trace (*)     Occult Blood Large (*)     All other components within normal limits   URINE MICROSCOPIC (W/UA) - Abnormal; Notable for the following components:    WBC 11-20 (*)     RBC 6-10 (*)     Bacteria Few (*)     All other components within normal limits   URINALYSIS,CULTURE IF INDICATED - Abnormal; Notable for the following components:    Occult Blood Moderate (*)     All other components within normal limits   URINE " MICROSCOPIC (W/UA) - Abnormal; Notable for the following components:    RBC 6-10 (*)     Bacteria Few (*)     All other components within normal limits   HCG QUALITATIVE UR   URINE CULTURE(NEW)     Initial urinalysis is consistent with contamination, it was repeated, there is now no leuk esterase or nitrates, there are moderate blood and 6-10 RBCs suspect contamination in the setting of active menstruation    I have independently interpreted this EKG          COURSE & MEDICAL DECISION MAKING    ASSESSMENT, COURSE AND PLAN  Care Narrative: 24-year-old female presenting with concern for hematuria, in retrospect was menstruation, as well as dysuria.  Vital signs are stable.  Exam is reassuring, no significant abdominal tenderness no CVA tenderness.  I offered a pelvic exam, patient declines at this time.      Her urinalysis is not suggestive of an acute UTI today, I discussed that her urine culture is pending, if it were indeed positive, she would need antibiotics to cover for cystitis.    I discussed that if her symptoms continue, she may need additional evaluation such as urology.    Given her lack of abdominal pain, and now clear source of bleeding, low suspicion for acute abdominal process, IV labs I do not believe will be helpful, no suspicion for ectopic given negative hCG, no significant adnexal pain to suggest torsion, no right lower quadrant tenderness to suggest appendicitis.    Patient is requesting discharge, which I feel is reasonable.  She was discharged in no acute distress.    Reviewed patient's laboratory, results at the bedside, patient is agreeable to discharge, we discussed strict return precautions, and outpatient follow-up, patient was discharged in no acute distress.  All questions were answered prior to discharge.        DISPOSITION AND DISCUSSIONS  Outpatient follow-up with primary  Escalation of care considered, and ultimately not performed:Laboratory analysis and diagnostic imaging      FINAL  DIAGNOSIS  1. Dysuria         Electronically signed by: Vijay Sinha M.D., 2/3/2025 10:13 PM

## 2025-02-04 NOTE — ED NOTES
Pt given d/c paperwork and work note; pt verbalized understanding all information gvien. Pt ambulated out of the ER w/ difficulty w/ friend

## 2025-02-04 NOTE — ED TRIAGE NOTES
"Chief Complaint   Patient presents with    Blood in Urine     Started this am    Painful Urination     Started today     BP (!) 146/94   Pulse 66   Temp 36.3 °C (97.3 °F) (Temporal)   Resp 15   Ht 1.702 m (5' 7\")   Wt 90.4 kg (199 lb 4.7 oz)   LMP 01/27/2025   SpO2 92%   BMI 31.21 kg/m²     Pt ambulated to ED w/ visitor for c/o dysuria and blood in urine, recent treatment w/ antibx for UTI.    "

## 2025-02-06 LAB
BACTERIA UR CULT: NORMAL
SIGNIFICANT IND 70042: NORMAL
SITE SITE: NORMAL
SOURCE SOURCE: NORMAL

## 2025-04-28 ENCOUNTER — PATIENT MESSAGE (OUTPATIENT)
Dept: MEDICAL GROUP | Facility: LAB | Age: 25
End: 2025-04-28
Payer: COMMERCIAL

## 2025-04-28 DIAGNOSIS — Z30.09 FAMILY PLANNING INITIATION: ICD-10-CM

## 2025-04-28 DIAGNOSIS — F32.A DEPRESSION, UNSPECIFIED DEPRESSION TYPE: ICD-10-CM

## 2025-04-28 RX ORDER — NORETHINDRONE ACETATE AND ETHINYL ESTRADIOL 1.5-30(21)
1 KIT ORAL DAILY
Qty: 28 TABLET | Refills: 11 | Status: SHIPPED | OUTPATIENT
Start: 2025-04-28

## 2025-04-28 NOTE — TELEPHONE ENCOUNTER
Received request via: Patient    Was the patient seen in the last year in this department? Yes    Does the patient have an active prescription (recently filled or refills available) for medication(s) requested? No    Pharmacy Name: [facility]    Does the patient have half-way Plus and need 100-day supply? (This applies to ALL medications) Patient does not have SCP

## 2025-06-13 ENCOUNTER — APPOINTMENT (OUTPATIENT)
Dept: RADIOLOGY | Facility: MEDICAL CENTER | Age: 25
End: 2025-06-13
Attending: STUDENT IN AN ORGANIZED HEALTH CARE EDUCATION/TRAINING PROGRAM
Payer: COMMERCIAL

## 2025-06-13 ENCOUNTER — HOSPITAL ENCOUNTER (EMERGENCY)
Facility: MEDICAL CENTER | Age: 25
End: 2025-06-14
Attending: STUDENT IN AN ORGANIZED HEALTH CARE EDUCATION/TRAINING PROGRAM
Payer: COMMERCIAL

## 2025-06-13 DIAGNOSIS — S00.03XA HEMATOMA OF SCALP, INITIAL ENCOUNTER: ICD-10-CM

## 2025-06-13 DIAGNOSIS — S09.90XA CLOSED HEAD INJURY, INITIAL ENCOUNTER: ICD-10-CM

## 2025-06-13 DIAGNOSIS — S01.01XA LACERATION OF SCALP, INITIAL ENCOUNTER: Primary | ICD-10-CM

## 2025-06-13 LAB
ALBUMIN SERPL BCP-MCNC: 4.1 G/DL (ref 3.2–4.9)
ALBUMIN/GLOB SERPL: 1.4 G/DL
ALP SERPL-CCNC: 58 U/L (ref 30–99)
ALT SERPL-CCNC: 12 U/L (ref 2–50)
ANION GAP SERPL CALC-SCNC: 12 MMOL/L (ref 7–16)
APTT PPP: 22.7 SEC (ref 24.7–36)
AST SERPL-CCNC: 22 U/L (ref 12–45)
BASOPHILS # BLD AUTO: 0.2 % (ref 0–1.8)
BASOPHILS # BLD: 0.03 K/UL (ref 0–0.12)
BILIRUB SERPL-MCNC: 0.2 MG/DL (ref 0.1–1.5)
BUN SERPL-MCNC: 9 MG/DL (ref 8–22)
CALCIUM ALBUM COR SERPL-MCNC: 8.9 MG/DL (ref 8.5–10.5)
CALCIUM SERPL-MCNC: 9 MG/DL (ref 8.5–10.5)
CHLORIDE SERPL-SCNC: 106 MMOL/L (ref 96–112)
CO2 SERPL-SCNC: 21 MMOL/L (ref 20–33)
CREAT SERPL-MCNC: 0.9 MG/DL (ref 0.5–1.4)
EOSINOPHIL # BLD AUTO: 0.04 K/UL (ref 0–0.51)
EOSINOPHIL NFR BLD: 0.2 % (ref 0–6.9)
ERYTHROCYTE [DISTWIDTH] IN BLOOD BY AUTOMATED COUNT: 41.1 FL (ref 35.9–50)
GFR SERPLBLD CREATININE-BSD FMLA CKD-EPI: 91 ML/MIN/1.73 M 2
GLOBULIN SER CALC-MCNC: 3 G/DL (ref 1.9–3.5)
GLUCOSE SERPL-MCNC: 150 MG/DL (ref 65–99)
HCG SERPL QL: NEGATIVE
HCT VFR BLD AUTO: 39.2 % (ref 37–47)
HGB BLD-MCNC: 13.2 G/DL (ref 12–16)
IMM GRANULOCYTES # BLD AUTO: 0.09 K/UL (ref 0–0.11)
IMM GRANULOCYTES NFR BLD AUTO: 0.5 % (ref 0–0.9)
INR PPP: 1.04 (ref 0.87–1.13)
LYMPHOCYTES # BLD AUTO: 1.09 K/UL (ref 1–4.8)
LYMPHOCYTES NFR BLD: 5.7 % (ref 22–41)
MCH RBC QN AUTO: 30.1 PG (ref 27–33)
MCHC RBC AUTO-ENTMCNC: 33.7 G/DL (ref 32.2–35.5)
MCV RBC AUTO: 89.3 FL (ref 81.4–97.8)
MONOCYTES # BLD AUTO: 0.74 K/UL (ref 0–0.85)
MONOCYTES NFR BLD AUTO: 3.8 % (ref 0–13.4)
NEUTROPHILS # BLD AUTO: 17.27 K/UL (ref 1.82–7.42)
NEUTROPHILS NFR BLD: 89.6 % (ref 44–72)
NRBC # BLD AUTO: 0 K/UL
NRBC BLD-RTO: 0 /100 WBC (ref 0–0.2)
PLATELET # BLD AUTO: 226 K/UL (ref 164–446)
PMV BLD AUTO: 12.4 FL (ref 9–12.9)
POTASSIUM SERPL-SCNC: 3.8 MMOL/L (ref 3.6–5.5)
PROT SERPL-MCNC: 7.1 G/DL (ref 6–8.2)
PROTHROMBIN TIME: 13.6 SEC (ref 12–14.6)
RBC # BLD AUTO: 4.39 M/UL (ref 4.2–5.4)
SODIUM SERPL-SCNC: 139 MMOL/L (ref 135–145)
WBC # BLD AUTO: 19.3 K/UL (ref 4.8–10.8)

## 2025-06-13 PROCEDURE — 305000 HCHG REPAIR-SIMPLE/INTERMED LEVEL 2: Mod: EDC

## 2025-06-13 PROCEDURE — 96375 TX/PRO/DX INJ NEW DRUG ADDON: CPT | Mod: EDC

## 2025-06-13 PROCEDURE — A9270 NON-COVERED ITEM OR SERVICE: HCPCS | Performed by: STUDENT IN AN ORGANIZED HEALTH CARE EDUCATION/TRAINING PROGRAM

## 2025-06-13 PROCEDURE — 700102 HCHG RX REV CODE 250 W/ 637 OVERRIDE(OP): Performed by: STUDENT IN AN ORGANIZED HEALTH CARE EDUCATION/TRAINING PROGRAM

## 2025-06-13 PROCEDURE — 90471 IMMUNIZATION ADMIN: CPT | Mod: EDC

## 2025-06-13 PROCEDURE — 90715 TDAP VACCINE 7 YRS/> IM: CPT | Performed by: STUDENT IN AN ORGANIZED HEALTH CARE EDUCATION/TRAINING PROGRAM

## 2025-06-13 PROCEDURE — 72125 CT NECK SPINE W/O DYE: CPT

## 2025-06-13 PROCEDURE — 80053 COMPREHEN METABOLIC PANEL: CPT

## 2025-06-13 PROCEDURE — 700101 HCHG RX REV CODE 250: Performed by: STUDENT IN AN ORGANIZED HEALTH CARE EDUCATION/TRAINING PROGRAM

## 2025-06-13 PROCEDURE — 99285 EMERGENCY DEPT VISIT HI MDM: CPT | Mod: EDC

## 2025-06-13 PROCEDURE — 36415 COLL VENOUS BLD VENIPUNCTURE: CPT | Mod: EDC

## 2025-06-13 PROCEDURE — 73080 X-RAY EXAM OF ELBOW: CPT | Mod: LT

## 2025-06-13 PROCEDURE — 700111 HCHG RX REV CODE 636 W/ 250 OVERRIDE (IP): Performed by: STUDENT IN AN ORGANIZED HEALTH CARE EDUCATION/TRAINING PROGRAM

## 2025-06-13 PROCEDURE — 70450 CT HEAD/BRAIN W/O DYE: CPT

## 2025-06-13 PROCEDURE — 305308 HCHG STAPLER,SKIN,DISP.: Mod: EDC

## 2025-06-13 PROCEDURE — 85025 COMPLETE CBC W/AUTO DIFF WBC: CPT

## 2025-06-13 PROCEDURE — 85610 PROTHROMBIN TIME: CPT

## 2025-06-13 PROCEDURE — 96374 THER/PROPH/DIAG INJ IV PUSH: CPT | Mod: EDC

## 2025-06-13 PROCEDURE — 85730 THROMBOPLASTIN TIME PARTIAL: CPT

## 2025-06-13 PROCEDURE — 304217 HCHG IRRIGATION SYSTEM: Mod: EDC

## 2025-06-13 PROCEDURE — 84703 CHORIONIC GONADOTROPIN ASSAY: CPT

## 2025-06-13 RX ORDER — MORPHINE SULFATE 4 MG/ML
4 INJECTION INTRAVENOUS ONCE
Status: COMPLETED | OUTPATIENT
Start: 2025-06-13 | End: 2025-06-13

## 2025-06-13 RX ORDER — LIDOCAINE HYDROCHLORIDE AND EPINEPHRINE 10; 10 MG/ML; UG/ML
10 INJECTION, SOLUTION INFILTRATION; PERINEURAL ONCE
Status: COMPLETED | OUTPATIENT
Start: 2025-06-13 | End: 2025-06-13

## 2025-06-13 RX ORDER — ACETAMINOPHEN 500 MG
1000 TABLET ORAL ONCE
Status: COMPLETED | OUTPATIENT
Start: 2025-06-13 | End: 2025-06-13

## 2025-06-13 RX ORDER — ONDANSETRON 2 MG/ML
4 INJECTION INTRAMUSCULAR; INTRAVENOUS ONCE
Status: COMPLETED | OUTPATIENT
Start: 2025-06-13 | End: 2025-06-13

## 2025-06-13 RX ADMIN — ONDANSETRON 4 MG: 2 INJECTION INTRAMUSCULAR; INTRAVENOUS at 22:31

## 2025-06-13 RX ADMIN — CLOSTRIDIUM TETANI TOXOID ANTIGEN (FORMALDEHYDE INACTIVATED), CORYNEBACTERIUM DIPHTHERIAE TOXOID ANTIGEN (FORMALDEHYDE INACTIVATED), BORDETELLA PERTUSSIS TOXOID ANTIGEN (GLUTARALDEHYDE INACTIVATED), BORDETELLA PERTUSSIS FILAMENTOUS HEMAGGLUTININ ANTIGEN (FORMALDEHYDE INACTIVATED), BORDETELLA PERTUSSIS PERTACTIN ANTIGEN, AND BORDETELLA PERTUSSIS FIMBRIAE 2/3 ANTIGEN 0.5 ML: 5; 2; 2.5; 5; 3; 5 INJECTION, SUSPENSION INTRAMUSCULAR at 23:12

## 2025-06-13 RX ADMIN — LIDOCAINE HYDROCHLORIDE AND EPINEPHRINE 10 ML: 10; 10 INJECTION, SOLUTION INFILTRATION; PERINEURAL at 22:30

## 2025-06-13 RX ADMIN — ACETAMINOPHEN 1000 MG: 500 TABLET ORAL at 22:39

## 2025-06-13 RX ADMIN — MORPHINE SULFATE 4 MG: 4 INJECTION INTRAVENOUS at 22:37

## 2025-06-13 ASSESSMENT — FIBROSIS 4 INDEX: FIB4 SCORE: 0.6

## 2025-06-14 ENCOUNTER — PHARMACY VISIT (OUTPATIENT)
Dept: PHARMACY | Facility: MEDICAL CENTER | Age: 25
End: 2025-06-14
Payer: COMMERCIAL

## 2025-06-14 VITALS
HEART RATE: 66 BPM | OXYGEN SATURATION: 96 % | DIASTOLIC BLOOD PRESSURE: 70 MMHG | BODY MASS INDEX: 31.39 KG/M2 | WEIGHT: 200 LBS | TEMPERATURE: 97.7 F | RESPIRATION RATE: 16 BRPM | HEIGHT: 67 IN | SYSTOLIC BLOOD PRESSURE: 111 MMHG

## 2025-06-14 PROCEDURE — RXMED WILLOW AMBULATORY MEDICATION CHARGE: Performed by: STUDENT IN AN ORGANIZED HEALTH CARE EDUCATION/TRAINING PROGRAM

## 2025-06-14 RX ORDER — CEPHALEXIN 500 MG/1
500 CAPSULE ORAL 4 TIMES DAILY
Qty: 20 CAPSULE | Refills: 0 | Status: ACTIVE | OUTPATIENT
Start: 2025-06-14 | End: 2025-06-19
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
## 2025-06-14 NOTE — DISCHARGE INSTRUCTIONS
You had a laceration repaired in the emergency department today.  Please keep the area generally clean and dry, do not soak in water. You can clean it once daily gently with a damp washcloth and hand soap. You can also apply a small amount of triple antibiotic ointment to it daily. Please follow-up with primary care physician, urgent care or this facility for suture/staple removal in 7-10 days.  Return to the emergency department sooner if it appears that the laceration has an infection with signs of red streaking or pus, fever or severe swelling or pain at the laceration site.       Please take your antibiotics as prescribed 4 times a day for the next 5 days for infection prophylaxis.

## 2025-06-14 NOTE — ED NOTES
Pt medicated per MAR and tolerated well with mother and boyfriend at bedside. Pt to CT. Awake and alert. Skin PWD. No increased WOB. Pt and family with no needs or concerns at this time.

## 2025-06-14 NOTE — ED NOTES
"Gauze dressing placed on pt head. Pt reports pain feels \"much better.\" Pt is awake and alert. Skin PWD. No increased WOB.  "

## 2025-06-14 NOTE — ED NOTES
"[facility]    Discharge instructions, which include signs and symptoms to monitor patient for, as well as detailed information regarding laceration of scalp, closed head injury, hematoma provided.  All questions and concerns addressed at this time. Encouraged patient to schedule a follow- up appointment to be made with patient's PCP. Parent verbalizes understanding.    Prescription for keflex called into patient's preferred pharmacy. Patient advised that they will need to finish the entire course of antibiotics regardless if symptoms improve.  Patient advised to stop taking medications if signs and symptoms of allergic reaction occur and advised that medications can take approx 48 hours to take effect.  Patient advised to add probiotic to diet in case patient has diarrhea from antibiotic use.  Patient verbalizes understanding.    Patient leaves ER in no apparent distress. Provided education regarding returning to the ER for any new concerns or changes in patient's condition.      /70   Pulse 66   Temp 36.5 °C (97.7 °F) (Temporal)   Resp 16   Ht 1.702 m (5' 7\")   Wt 90.7 kg (200 lb)   SpO2 96%   BMI 31.32 kg/m²     "
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
## 2025-06-14 NOTE — ED NOTES
VS reassessed. Pt awake and alert. Skin PWD. No increased WOB. Pt with no needs or concerns at this time.

## 2025-06-23 ENCOUNTER — OFFICE VISIT (OUTPATIENT)
Dept: URGENT CARE | Facility: PHYSICIAN GROUP | Age: 25
End: 2025-06-23
Payer: COMMERCIAL

## 2025-06-23 VITALS
BODY MASS INDEX: 30.95 KG/M2 | SYSTOLIC BLOOD PRESSURE: 114 MMHG | OXYGEN SATURATION: 97 % | RESPIRATION RATE: 16 BRPM | HEART RATE: 67 BPM | WEIGHT: 197.2 LBS | DIASTOLIC BLOOD PRESSURE: 70 MMHG | TEMPERATURE: 97.5 F | HEIGHT: 67 IN

## 2025-06-23 DIAGNOSIS — F07.81 POST CONCUSSION SYNDROME: ICD-10-CM

## 2025-06-23 DIAGNOSIS — S01.01XD LACERATION OF SCALP, SUBSEQUENT ENCOUNTER: Primary | ICD-10-CM

## 2025-06-23 DIAGNOSIS — Z48.02 ENCOUNTER FOR STAPLE REMOVAL: ICD-10-CM

## 2025-06-23 ASSESSMENT — ENCOUNTER SYMPTOMS
TINGLING: 0
HEADACHES: 0
DIZZINESS: 0
LOSS OF CONSCIOUSNESS: 0
TREMORS: 0
SENSORY CHANGE: 0
FOCAL WEAKNESS: 0
SEIZURES: 0
ROS SKIN COMMENTS: SCALP LACERATION
DOUBLE VISION: 0
BLURRED VISION: 0
WEAKNESS: 0
PHOTOPHOBIA: 0

## 2025-06-23 ASSESSMENT — FIBROSIS 4 INDEX: FIB4 SCORE: 0.7
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
## 2025-06-23 NOTE — PROCEDURES
Suture Removal    Date/Time: 6/23/2025 1:25 PM    Performed by: MARIEL Mccall  Authorized by: MARIEL Mccall  Body area: head/neck  Location details: scalp  Wound Appearance: clean  Staples Removed: 17  Post-removal: antibiotic ointment applied  Facility: sutures placed in this facility  Patient tolerance: patient tolerated the procedure well with no immediate complications

## 2025-06-24 DIAGNOSIS — Z30.09 FAMILY PLANNING INITIATION: ICD-10-CM

## 2025-06-25 RX ORDER — NORETHINDRONE ACETATE AND ETHINYL ESTRADIOL 1.5-30(21)
1 KIT ORAL DAILY
Qty: 28 TABLET | Refills: 11 | Status: SHIPPED | OUTPATIENT
Start: 2025-06-25

## 2025-07-08 ENCOUNTER — OFFICE VISIT (OUTPATIENT)
Dept: URGENT CARE | Facility: CLINIC | Age: 25
End: 2025-07-08
Payer: COMMERCIAL

## 2025-07-08 VITALS
TEMPERATURE: 97.9 F | SYSTOLIC BLOOD PRESSURE: 122 MMHG | DIASTOLIC BLOOD PRESSURE: 78 MMHG | HEIGHT: 67 IN | OXYGEN SATURATION: 98 % | RESPIRATION RATE: 14 BRPM | HEART RATE: 69 BPM | WEIGHT: 200.62 LBS | BODY MASS INDEX: 31.49 KG/M2

## 2025-07-08 DIAGNOSIS — H66.90 ACUTE OTITIS MEDIA, UNSPECIFIED OTITIS MEDIA TYPE: Primary | ICD-10-CM

## 2025-07-08 PROCEDURE — 3074F SYST BP LT 130 MM HG: CPT | Performed by: PHYSICIAN ASSISTANT

## 2025-07-08 PROCEDURE — 99213 OFFICE O/P EST LOW 20 MIN: CPT | Performed by: PHYSICIAN ASSISTANT

## 2025-07-08 PROCEDURE — 3078F DIAST BP <80 MM HG: CPT | Performed by: PHYSICIAN ASSISTANT

## 2025-07-08 RX ORDER — FLUTICASONE PROPIONATE 50 MCG
1 SPRAY, SUSPENSION (ML) NASAL DAILY
Qty: 16 G | Refills: 0 | Status: SHIPPED | OUTPATIENT
Start: 2025-07-08 | End: 2025-07-22

## 2025-07-08 ASSESSMENT — ENCOUNTER SYMPTOMS
WHEEZING: 0
SPUTUM PRODUCTION: 0
FEVER: 0
DIARRHEA: 0
SHORTNESS OF BREATH: 0
MYALGIAS: 0
EYE REDNESS: 0
SORE THROAT: 0
EYE DISCHARGE: 0
HEADACHES: 0
COUGH: 0
VOMITING: 0

## 2025-07-08 ASSESSMENT — FIBROSIS 4 INDEX: FIB4 SCORE: 0.7

## 2025-07-08 NOTE — PROGRESS NOTES
"Subjective     Alexandra Hines is a 24 y.o. female who presents with Otalgia (X)            Patient is a pleasant 24-year-old female presents to urgent care with bilateral ear pressure for the last few days.  Patient notes over the last 24 to 36 hours she has developed worsening fullness and discomfort prompting evaluation today.  Patient also notes that she feels slightly off balance in particular on her right side and feels that she may be having an inner ear issue.  Patient denies headache, fevers.    Otalgia   There is pain in both ears. This is a new problem. The current episode started in the past 7 days. There has been no fever. Associated symptoms include hearing loss. Pertinent negatives include no coughing, diarrhea, ear discharge, headaches, rash, sore throat or vomiting.       Review of Systems   Constitutional:  Negative for fever and malaise/fatigue.   HENT:  Positive for ear pain and hearing loss. Negative for ear discharge and sore throat.    Eyes:  Negative for discharge and redness.   Respiratory:  Negative for cough, sputum production, shortness of breath and wheezing.    Cardiovascular:  Negative for chest pain and leg swelling.   Gastrointestinal:  Negative for diarrhea and vomiting.   Genitourinary:  Negative for dysuria and urgency.   Musculoskeletal:  Negative for myalgias.   Skin:  Negative for rash.   Neurological:  Negative for headaches.              Objective     /78 (BP Location: Right arm, Patient Position: Sitting, BP Cuff Size: Adult)   Pulse 69   Temp 36.6 °C (97.9 °F) (Temporal)   Resp 14   Ht 1.702 m (5' 7\")   Wt 91 kg (200 lb 9.9 oz)   LMP 06/18/2025 (Approximate)   SpO2 98%   BMI 31.42 kg/m²    PMH:  has a past medical history of Allergic rhinitis, ASTHMA (8/15/2013), Bowel habit changes, Depression, Gynecological disorder, History of COVID-19, IBS (irritable bowel syndrome), Menarche (8/15/2013), Nausea, vomiting and diarrhea (11/19/2014), Pain (03/09/2022), " and Snoring.  MEDS: Reviewed .   ALLERGIES: Allergies[1]  SURGHX: Past Surgical History[2]  SOCHX:  reports that she has never smoked. She has never used smokeless tobacco. She reports current alcohol use. She reports that she does not currently use drugs.  FH: Family history was reviewed, no pertinent findings to report    Physical Exam  Vitals reviewed.   Constitutional:       Appearance: Normal appearance. She is well-developed.   HENT:      Head: Normocephalic and atraumatic.      Ears:      Comments: Bilateral clear middle ear effusions-mild erythema to the right with bilateral bulging of the TMs.  Mastoids nontender.  Tenderness along bilateral eustachian tubes.     Nose:      Comments: Rhinorrhea noted.  Eyes:      Conjunctiva/sclera: Conjunctivae normal.      Pupils: Pupils are equal, round, and reactive to light.   Cardiovascular:      Rate and Rhythm: Normal rate and regular rhythm.      Heart sounds: No murmur heard.  Pulmonary:      Effort: Pulmonary effort is normal. No respiratory distress.      Breath sounds: Normal breath sounds.   Musculoskeletal:         General: Normal range of motion.      Cervical back: Normal range of motion and neck supple.      Right lower leg: No edema.      Left lower leg: No edema.   Lymphadenopathy:      Cervical: No cervical adenopathy.   Skin:     General: Skin is warm.      Findings: No rash.   Neurological:      Mental Status: She is alert and oriented to person, place, and time.   Psychiatric:         Mood and Affect: Mood normal.         Behavior: Behavior normal.         Thought Content: Thought content normal.                                  Assessment & Plan  Acute otitis media, unspecified otitis media type    Orders:    amoxicillin-clavulanate (AUGMENTIN) 875-125 MG Tab; Take 1 Tablet by mouth 2 times a day for 7 days.    fluticasone (FLONASE) 50 MCG/ACT nasal spray; Administer 1 Spray into affected nostril(S) every day for 14 days.       Appears that patient  may had a few days of eustachian tube dysfunction with ear pressure now developing ear discomfort.  Patient with right otitis media with clear middle ear effusion on the left without erythema.  Will start the patient on the above at this time.    Differential diagnosis, natural history, supportive care, and indications for immediate follow-up discussed. Side effects of OTC or prescribed medications discussed.      Follow-up as needed if symptoms worsen or fail to improve to PCP, Urgent care or Emergency Room.     I have personally reviewed prior external notes and test results pertinent to today's visit.  I have independently reviewed and interpreted all diagnostics ordered during this urgent care acute visit.   Discussed management options (risks,benefits, and alternatives to treatment).    The patient and/or guardian demonstrated a good understanding and agreed with the treatment plan. And all questions were answered.  Please note that this dictation was created using voice recognition software. I have made a reasonable attempt to correct obvious errors, but I expect that there are errors of grammar and possibly content that I did not discover before finalizing the note.                           [1]   Allergies  Allergen Reactions    Other Environmental Anaphylaxis, Nausea, Shortness of Breath and Vomiting   [2]   Past Surgical History:  Procedure Laterality Date    PB OPEN TX DISTAL TIBIOFIBULAR JOINT DISRUPTION Right 3/15/2022    Procedure: RIGHT OPEN REDUCTION INTERNAL FIXATION FIBULA NONUNION, SYNDESMOSIS REPAIR, ANKLE ARTHROSCOPY;  Surgeon: Erwin Smith M.D.;  [place];  Service: Orthopedics    SD UNLISTED PROC, ARTHROSCOPY Right 3/15/2022    Procedure: ARTHROSCOPY, ANKLE;  Surgeon: Erwin Smith M.D.;  Location[place];  Service: Orthopedics    SOHAM BY LAPAROSCOPY N/A 12/17/2015    Procedure: SOHAM BY LAPAROSCOPY ;  Surgeon: Yuliet Dale M.D.;  Location:  [facility];  Service:     COLONOSCOPY  10/2015    ENDOSCOPY  08/2015    DENTAL EXTRACTION(S)      wisdom teeth

## 2025-07-09 ENCOUNTER — HOSPITAL ENCOUNTER (OUTPATIENT)
Facility: MEDICAL CENTER | Age: 25
End: 2025-07-09
Attending: OBSTETRICS & GYNECOLOGY
Payer: COMMERCIAL

## 2025-07-09 PROCEDURE — 88142 CYTOPATH C/V THIN LAYER: CPT

## 2025-07-15 LAB — THINPREP PAP, CYTOLOGY NL11781: NORMAL

## 2025-07-18 ENCOUNTER — NON-PROVIDER VISIT (OUTPATIENT)
Dept: URGENT CARE | Facility: CLINIC | Age: 25
End: 2025-07-18

## 2025-07-18 DIAGNOSIS — Z02.1 PRE-EMPLOYMENT EXAMINATION: Primary | ICD-10-CM

## 2025-07-18 PROCEDURE — 86580 TB INTRADERMAL TEST: CPT

## 2025-07-18 NOTE — PROGRESS NOTES
Alexandra Hines is a 24 y.o. female here for a non-provider visit for PPD placement -- 1 of 1    Reason for PPD:  work requirement    1. TB evaluation questionnaire completed by patient? Yes      -  If any answers marked yes did you contact a provider prior to placing? Not Indicated  2.  Patient notified to return to clinic for reading on: 7/20/25 after 12:05 pm or 7/21/25 before 12:05 pm  3.  PPD Placement documentation completed on TB evaluation questionnaire? Yes  4.  Location of TB evaluation questionnaire filed: yes

## 2025-07-21 ENCOUNTER — NON-PROVIDER VISIT (OUTPATIENT)
Dept: URGENT CARE | Facility: CLINIC | Age: 25
End: 2025-07-21

## 2025-07-21 LAB — TB WHEAL 3D P 5 TU DIAM: 0 MM

## 2025-07-21 NOTE — PROGRESS NOTES
Alexandra Hines is a 24 y.o. female here for a non-provider visit for PPD reading -- Step 1 of 1.      1.  Resulted in Epic under enter/edit results? Yes   2.  TB evaluation questionnaire scanned into chart and original given to patient?Yes      3. Was induration greater than 0 mm? No.      Routed to PCP? No

## 2025-07-29 ENCOUNTER — APPOINTMENT (OUTPATIENT)
Dept: MEDICAL GROUP | Facility: LAB | Age: 25
End: 2025-07-29
Payer: COMMERCIAL

## (undated) DEVICE — DRAPE LARGE 3 QUARTER - (20/CA)

## (undated) DEVICE — CANISTER SUCTION 3000ML MECHANICAL FILTER AUTO SHUTOFF MEDI-VAC NONSTERILE LF DISP  (40EA/CA)

## (undated) DEVICE — SET EXTENSION WITH 2 PORTS (48EA/CA) ***PART #2C8610 IS A SUBSTITUTE*****

## (undated) DEVICE — TUBING PATIENT W/CONNECTOR REDEUCE (1EA)

## (undated) DEVICE — CHLORAPREP 26 ML APPLICATOR - ORANGE TINT(25/CA)

## (undated) DEVICE — SENSOR SPO2 NEO LNCS ADHESIVE (20/BX) SEE USER NOTES

## (undated) DEVICE — PAD LAP STERILE 18 X 18 - (5/PK 40PK/CA)

## (undated) DEVICE — NEPTUNE 4 PORT MANIFOLD - (20/PK)

## (undated) DEVICE — NEEDLE SAFETY 18 GA X 1 1/2 IN (100EA/BX)

## (undated) DEVICE — DRESSING 3X3 ADAPTIC GAUZE - (50EA/CT)

## (undated) DEVICE — DRAPE 36X28IN RAD CARM BND BG - (25/CA) O

## (undated) DEVICE — GLOVE BIOGEL PI ORTHO SZ 6 1/2 SURGICAL PF LF (40PR/BX)

## (undated) DEVICE — SODIUM CHL. IRRIGATION 0.9% 3000ML (4EA/CA 65CA/PF)

## (undated) DEVICE — DRESSING ABDOMINAL PAD STERILE 8 X 10" (360EA/CA)"

## (undated) DEVICE — GLOVE BIOGEL SZ 8 SURGICAL PF LTX - (50PR/BX 4BX/CA)

## (undated) DEVICE — TUBING CLEARLINK DUO-VENT - C-FLO (48EA/CA)

## (undated) DEVICE — HEAD HOLDER JUNIOR/ADULT

## (undated) DEVICE — SUTURE 3-0 MONOCRYL PLUS PS-1 - 27 INCH (36/BX)

## (undated) DEVICE — SPLINT PLASTER 5 IN X 30 IN - (50EA/BX 6BX/CA)

## (undated) DEVICE — ELECTRODE DUAL RETURN W/ CORD - (50/PK)

## (undated) DEVICE — GLOVE BIOGEL SZ 7 SURGICAL PF LTX - (50PR/BX 4BX/CA)

## (undated) DEVICE — WRAP CO-FLEX 4IN X 5YD STERIL - SELF-ADHERENT (18/CA)

## (undated) DEVICE — SHAVER 3.5 SM JT AGGRES.MEN. (5EA/BX)

## (undated) DEVICE — MASK ANESTHESIA ADULT  - (100/CA)

## (undated) DEVICE — PACK LOWER EXTREMITY - (2/CA)

## (undated) DEVICE — SLEEVE, VASO, THIGH, MED

## (undated) DEVICE — TUBING PUMP WITH CONNECTOR REDEUCE (1EA)

## (undated) DEVICE — SUTURE GENERAL

## (undated) DEVICE — DRAPE C ARMOR (12EA/CA)

## (undated) DEVICE — BLADE SURGICAL #15 - (50/BX 3BX/CA)

## (undated) DEVICE — BIT DRILL L122MM OD3.5MM NONSTERILE OVER ADD ON FITTING

## (undated) DEVICE — PADDING CAST 6 IN STERILE - 6 X 4 YDS (24/CA)

## (undated) DEVICE — ELECTRODE 850 FOAM ADHESIVE - HYDROGEL RADIOTRNSPRNT (50/PK)

## (undated) DEVICE — SUCTION INSTRUMENT YANKAUER BULBOUS TIP W/O VENT (50EA/CA)

## (undated) DEVICE — LACTATED RINGERS INJ 1000 ML - (14EA/CA 60CA/PF)

## (undated) DEVICE — PROTECTOR ULNA NERVE - (36PR/CA)

## (undated) DEVICE — GLOVE BIOGEL INDICATOR SZ 7SURGICAL PF LTX - (50/BX 4BX/CA)

## (undated) DEVICE — BIT DRILL DIA2.6MM SCALED FOR VARIAX 2 WRIST FUSION LOCKING PLATE SYSTEM

## (undated) DEVICE — KIT ANESTHESIA W/CIRCUIT & 3/LT BAG W/FILTER (20EA/CA)

## (undated) DEVICE — SODIUM CHL IRRIGATION 0.9% 1000ML (12EA/CA)

## (undated) DEVICE — SUTURE 2-0 MONOCRYL CT-1

## (undated) DEVICE — TOWEL STOP TIMEOUT SAFETY FLAG (40EA/CA)

## (undated) DEVICE — GOWN WARMING STANDARD FLEX - (30/CA)

## (undated) DEVICE — STOCKINET BIAS 6 IN STERILE - (20/CA)

## (undated) DEVICE — GLOVE BIOGEL PI ORTHO SZ 7.5 PF LF (40PR/BX)

## (undated) DEVICE — SUTURE 3-0 ETHILON FS-1 - (36/BX) 30 INCH

## (undated) DEVICE — GLOVE BIOGEL INDICATOR SZ 8.5 SURGICAL PF LTX - (50/BX 4BX/CA)

## (undated) DEVICE — SET LEADWIRE 5 LEAD BEDSIDE DISPOSABLE ECG (1SET OF 5/EA)